# Patient Record
Sex: FEMALE | Race: WHITE | NOT HISPANIC OR LATINO | Employment: UNEMPLOYED | URBAN - METROPOLITAN AREA
[De-identification: names, ages, dates, MRNs, and addresses within clinical notes are randomized per-mention and may not be internally consistent; named-entity substitution may affect disease eponyms.]

---

## 2024-01-02 ENCOUNTER — HOSPITAL ENCOUNTER (OUTPATIENT)
Dept: RADIOLOGY | Facility: CLINIC | Age: 54
Discharge: HOME | End: 2024-01-02
Payer: COMMERCIAL

## 2024-01-02 ENCOUNTER — OFFICE VISIT (OUTPATIENT)
Dept: URGENT CARE | Facility: CLINIC | Age: 54
End: 2024-01-02
Payer: COMMERCIAL

## 2024-01-02 VITALS
SYSTOLIC BLOOD PRESSURE: 103 MMHG | TEMPERATURE: 97.8 F | RESPIRATION RATE: 18 BRPM | HEART RATE: 84 BPM | OXYGEN SATURATION: 93 % | DIASTOLIC BLOOD PRESSURE: 78 MMHG

## 2024-01-02 DIAGNOSIS — J18.9 PNEUMONIA OF RIGHT MIDDLE LOBE DUE TO INFECTIOUS ORGANISM: ICD-10-CM

## 2024-01-02 DIAGNOSIS — R05.1 ACUTE COUGH: Primary | ICD-10-CM

## 2024-01-02 DIAGNOSIS — R05.1 ACUTE COUGH: ICD-10-CM

## 2024-01-02 PROCEDURE — 99204 OFFICE O/P NEW MOD 45 MIN: CPT | Performed by: PHYSICIAN ASSISTANT

## 2024-01-02 PROCEDURE — 71046 X-RAY EXAM CHEST 2 VIEWS: CPT | Performed by: RADIOLOGY

## 2024-01-02 PROCEDURE — 71046 X-RAY EXAM CHEST 2 VIEWS: CPT

## 2024-01-02 RX ORDER — PREDNISONE 20 MG/1
40 TABLET ORAL DAILY
Qty: 10 TABLET | Refills: 0 | Status: SHIPPED | OUTPATIENT
Start: 2024-01-02 | End: 2024-01-07

## 2024-01-02 RX ORDER — DOXYCYCLINE 100 MG/1
100 CAPSULE ORAL 2 TIMES DAILY
Qty: 20 CAPSULE | Refills: 0 | Status: SHIPPED | OUTPATIENT
Start: 2024-01-02 | End: 2024-01-12

## 2024-01-02 ASSESSMENT — ENCOUNTER SYMPTOMS
WHEEZING: 1
PSYCHIATRIC NEGATIVE: 1
FEVER: 1
GASTROINTESTINAL NEGATIVE: 1
ENDOCRINE NEGATIVE: 1
ALLERGIC/IMMUNOLOGIC NEGATIVE: 1
HEMATOLOGIC/LYMPHATIC NEGATIVE: 1
COUGH: 1
MUSCULOSKELETAL NEGATIVE: 1
NEUROLOGICAL NEGATIVE: 1
EYES NEGATIVE: 1
SORE THROAT: 0

## 2024-01-02 NOTE — PROGRESS NOTES
Subjective   Patient ID: Ayesha Nova is a 53 y.o. female.      History provided by:  Patient   used: No    Fever   Associated symptoms include congestion, coughing and wheezing. Pertinent negatives include no ear pain or sore throat.   Cough  Associated symptoms include a fever and wheezing. Pertinent negatives include no ear pain or sore throat.   Wheezing  Associated symptoms: cough and fever    Associated symptoms: no ear pain and no sore throat      This is a 53 yr old female here for respiratory sxs. Fever up to 101, wheezing, cough productive of yellow sputum, and clear nasal congestion all week. Had negative COVID, flu and RSV test at pcp office, but no provider in to see pt today. Hx of lung mets from breast Ca.   Review of Systems   Constitutional:  Positive for fever.   HENT:  Positive for congestion. Negative for ear pain and sore throat.    Eyes: Negative.    Respiratory:  Positive for cough and wheezing.    Cardiovascular:  Negative for leg swelling.   Gastrointestinal: Negative.    Endocrine: Negative.    Genitourinary: Negative.    Musculoskeletal: Negative.    Skin: Negative.    Allergic/Immunologic: Negative.    Neurological: Negative.    Hematological: Negative.    Psychiatric/Behavioral: Negative.     All other systems reviewed and are negative.  /78   Pulse 84   Temp 36.6 °C (97.8 °F)   Resp 18   SpO2 93%     Objective   Physical Exam  Vitals and nursing note reviewed.   Constitutional:       Appearance: Normal appearance.   HENT:      Head: Normocephalic and atraumatic.      Right Ear: Tympanic membrane and ear canal normal.      Left Ear: Tympanic membrane and ear canal normal.      Mouth/Throat:      Mouth: Mucous membranes are moist.      Pharynx: Oropharynx is clear.   Cardiovascular:      Rate and Rhythm: Normal rate and regular rhythm.   Pulmonary:      Breath sounds: Wheezing and rhonchi present.   Musculoskeletal:      Cervical back: Neck supple.    Lymphadenopathy:      Cervical: No cervical adenopathy.   Skin:     General: Skin is warm and dry.   Neurological:      General: No focal deficit present.      Mental Status: She is alert and oriented to person, place, and time.   Psychiatric:         Mood and Affect: Mood normal.         Behavior: Behavior normal.     Assessment:  PNA    Plan;  CXR shows RML inflitrate by my read  Doxycycline 100 mg bid x 10 days  Prednisone 40 mg daily x 5 days  Has albuterol MDI as directed  OTC cough med as directed  Pcp follow up in 2-3 days  ER visit anytime 24/7 for acute worsening or changing condition

## 2024-01-02 NOTE — PATIENT INSTRUCTIONS
Use your albuterol MDI as directed  OTC cough med like delsym, robitussin or mucinex as directed  Fluids and rest  See pcp in 2-3 days for recheck  ER visit anytime 24/7 for acute worsening or changing condition

## 2024-01-05 ENCOUNTER — ANCILLARY PROCEDURE (OUTPATIENT)
Dept: RADIOLOGY | Facility: CLINIC | Age: 54
End: 2024-01-05
Payer: COMMERCIAL

## 2024-01-05 DIAGNOSIS — R05.9 COUGH, UNSPECIFIED: ICD-10-CM

## 2024-01-05 DIAGNOSIS — C50.919 MALIGNANT NEOPLASM OF UNSPECIFIED SITE OF UNSPECIFIED FEMALE BREAST (MULTI): ICD-10-CM

## 2024-01-05 DIAGNOSIS — R06.02 SHORTNESS OF BREATH: ICD-10-CM

## 2024-01-05 DIAGNOSIS — C79.9 SECONDARY MALIGNANT NEOPLASM OF UNSPECIFIED SITE (CODE) (MULTI): ICD-10-CM

## 2024-01-05 PROCEDURE — 71250 CT THORAX DX C-: CPT

## 2024-01-05 PROCEDURE — 71250 CT THORAX DX C-: CPT | Performed by: RADIOLOGY

## 2024-01-12 ENCOUNTER — APPOINTMENT (OUTPATIENT)
Dept: RADIOLOGY | Facility: CLINIC | Age: 54
End: 2024-01-12
Payer: COMMERCIAL

## 2024-02-09 ENCOUNTER — APPOINTMENT (OUTPATIENT)
Dept: CARDIOLOGY | Facility: CLINIC | Age: 54
End: 2024-02-09
Payer: COMMERCIAL

## 2024-02-09 PROBLEM — M25.552 LEFT HIP PAIN: Status: ACTIVE | Noted: 2024-02-09

## 2024-02-09 PROBLEM — R91.8 MULTIPLE PULMONARY NODULES: Status: ACTIVE | Noted: 2018-11-07

## 2024-02-09 PROBLEM — R92.8 ABNORMAL FINDING ON BREAST IMAGING: Status: ACTIVE | Noted: 2024-02-09

## 2024-02-09 PROBLEM — J06.9 VIRAL UPPER RESPIRATORY TRACT INFECTION WITH COUGH: Status: ACTIVE | Noted: 2024-02-09

## 2024-02-09 PROBLEM — K64.9 HEMORRHOIDS: Status: ACTIVE | Noted: 2024-02-09

## 2024-02-09 PROBLEM — Z86.59 HISTORY OF CLAUSTROPHOBIA: Status: ACTIVE | Noted: 2024-02-09

## 2024-02-09 PROBLEM — E88.810 METABOLIC SYNDROME X: Status: ACTIVE | Noted: 2019-03-25

## 2024-02-09 PROBLEM — E78.5 HYPERLIPIDEMIA: Status: ACTIVE | Noted: 2019-03-25

## 2024-02-09 PROBLEM — N95.8 GENITOURINARY SYNDROME OF MENOPAUSE: Status: ACTIVE | Noted: 2024-02-09

## 2024-02-09 PROBLEM — N60.99 ATYPICAL DUCTAL HYPERPLASIA OF BREAST: Status: ACTIVE | Noted: 2024-02-09

## 2024-02-09 PROBLEM — K85.90 PANCREATITIS, ACUTE (HHS-HCC): Status: ACTIVE | Noted: 2024-02-09

## 2024-02-09 PROBLEM — C78.01 MALIGNANT NEOPLASM METASTATIC TO BOTH LUNGS (MULTI): Status: ACTIVE | Noted: 2022-02-17

## 2024-02-09 PROBLEM — J18.9 PNEUMONIA DUE TO INFECTIOUS ORGANISM: Status: ACTIVE | Noted: 2024-02-09

## 2024-02-09 PROBLEM — R05.1 ACUTE COUGH: Status: ACTIVE | Noted: 2024-02-09

## 2024-02-09 PROBLEM — I63.9 CEREBROVASCULAR ACCIDENT (CVA) (MULTI): Status: ACTIVE | Noted: 2024-02-09

## 2024-02-09 PROBLEM — C50.211 MALIGNANT NEOPLASM OF UPPER-INNER QUADRANT OF RIGHT FEMALE BREAST (MULTI): Status: ACTIVE | Noted: 2023-05-19

## 2024-02-09 PROBLEM — E05.90 THYROTOXICOSIS: Status: ACTIVE | Noted: 2019-03-25

## 2024-02-09 PROBLEM — M54.50 LOW BACK PAIN: Status: ACTIVE | Noted: 2024-02-09

## 2024-02-09 PROBLEM — N65.1 BREAST ASYMMETRY FOLLOWING RECONSTRUCTIVE SURGERY: Status: ACTIVE | Noted: 2024-02-09

## 2024-02-09 PROBLEM — K82.8 GALLBLADDER SLUDGE: Status: ACTIVE | Noted: 2024-02-09

## 2024-02-09 PROBLEM — Z86.16 PERSONAL HISTORY OF COVID-19: Status: ACTIVE | Noted: 2022-11-13

## 2024-02-09 PROBLEM — C78.02 MALIGNANT NEOPLASM METASTATIC TO BOTH LUNGS (MULTI): Status: ACTIVE | Noted: 2022-02-17

## 2024-03-07 ENCOUNTER — HOSPITAL ENCOUNTER (EMERGENCY)
Facility: HOSPITAL | Age: 54
Discharge: HOME | End: 2024-03-07
Attending: EMERGENCY MEDICINE
Payer: COMMERCIAL

## 2024-03-07 ENCOUNTER — APPOINTMENT (OUTPATIENT)
Dept: RADIOLOGY | Facility: HOSPITAL | Age: 54
End: 2024-03-07
Payer: COMMERCIAL

## 2024-03-07 ENCOUNTER — APPOINTMENT (OUTPATIENT)
Dept: CARDIOLOGY | Facility: HOSPITAL | Age: 54
End: 2024-03-07
Payer: COMMERCIAL

## 2024-03-07 VITALS
TEMPERATURE: 97.7 F | HEIGHT: 63 IN | SYSTOLIC BLOOD PRESSURE: 100 MMHG | OXYGEN SATURATION: 99 % | WEIGHT: 155 LBS | RESPIRATION RATE: 20 BRPM | DIASTOLIC BLOOD PRESSURE: 55 MMHG | BODY MASS INDEX: 27.46 KG/M2 | HEART RATE: 72 BPM

## 2024-03-07 DIAGNOSIS — R00.2 PALPITATIONS: Primary | ICD-10-CM

## 2024-03-07 LAB
ALBUMIN SERPL BCP-MCNC: 4 G/DL (ref 3.4–5)
ALP SERPL-CCNC: 36 U/L (ref 33–110)
ALT SERPL W P-5'-P-CCNC: 8 U/L (ref 7–45)
ANION GAP SERPL CALC-SCNC: 11 MMOL/L (ref 10–20)
AST SERPL W P-5'-P-CCNC: 15 U/L (ref 9–39)
BASOPHILS # BLD AUTO: 0.03 X10*3/UL (ref 0–0.1)
BASOPHILS NFR BLD AUTO: 0.5 %
BILIRUB DIRECT SERPL-MCNC: 0.1 MG/DL (ref 0–0.3)
BILIRUB SERPL-MCNC: 0.7 MG/DL (ref 0–1.2)
BNP SERPL-MCNC: 35 PG/ML (ref 0–99)
BUN SERPL-MCNC: 8 MG/DL (ref 6–23)
CALCIUM SERPL-MCNC: 9.3 MG/DL (ref 8.6–10.3)
CARDIAC TROPONIN I PNL SERPL HS: <3 NG/L (ref 0–13)
CHLORIDE SERPL-SCNC: 104 MMOL/L (ref 98–107)
CO2 SERPL-SCNC: 26 MMOL/L (ref 21–32)
CREAT SERPL-MCNC: 0.66 MG/DL (ref 0.5–1.05)
EGFRCR SERPLBLD CKD-EPI 2021: >90 ML/MIN/1.73M*2
EOSINOPHIL # BLD AUTO: 0.06 X10*3/UL (ref 0–0.7)
EOSINOPHIL NFR BLD AUTO: 1 %
ERYTHROCYTE [DISTWIDTH] IN BLOOD BY AUTOMATED COUNT: 12.7 % (ref 11.5–14.5)
GLUCOSE SERPL-MCNC: 81 MG/DL (ref 74–99)
HCT VFR BLD AUTO: 38.1 % (ref 36–46)
HGB BLD-MCNC: 12.6 G/DL (ref 12–16)
IMM GRANULOCYTES # BLD AUTO: 0.01 X10*3/UL (ref 0–0.7)
IMM GRANULOCYTES NFR BLD AUTO: 0.2 % (ref 0–0.9)
LIPASE SERPL-CCNC: 33 U/L (ref 9–82)
LYMPHOCYTES # BLD AUTO: 1.21 X10*3/UL (ref 1.2–4.8)
LYMPHOCYTES NFR BLD AUTO: 19.7 %
MAGNESIUM SERPL-MCNC: 2.17 MG/DL (ref 1.6–2.4)
MCH RBC QN AUTO: 29 PG (ref 26–34)
MCHC RBC AUTO-ENTMCNC: 33.1 G/DL (ref 32–36)
MCV RBC AUTO: 88 FL (ref 80–100)
MONOCYTES # BLD AUTO: 0.35 X10*3/UL (ref 0.1–1)
MONOCYTES NFR BLD AUTO: 5.7 %
NEUTROPHILS # BLD AUTO: 4.47 X10*3/UL (ref 1.2–7.7)
NEUTROPHILS NFR BLD AUTO: 72.9 %
NRBC BLD-RTO: 0 /100 WBCS (ref 0–0)
PLATELET # BLD AUTO: 260 X10*3/UL (ref 150–450)
POTASSIUM SERPL-SCNC: 4.2 MMOL/L (ref 3.5–5.3)
PROT SERPL-MCNC: 7 G/DL (ref 6.4–8.2)
RBC # BLD AUTO: 4.35 X10*6/UL (ref 4–5.2)
SODIUM SERPL-SCNC: 137 MMOL/L (ref 136–145)
TSH SERPL-ACNC: 0.91 MIU/L (ref 0.44–3.98)
WBC # BLD AUTO: 6.1 X10*3/UL (ref 4.4–11.3)

## 2024-03-07 PROCEDURE — 74177 CT ABD & PELVIS W/CONTRAST: CPT | Mod: FOREIGN READ | Performed by: RADIOLOGY

## 2024-03-07 PROCEDURE — 93005 ELECTROCARDIOGRAM TRACING: CPT

## 2024-03-07 PROCEDURE — 36415 COLL VENOUS BLD VENIPUNCTURE: CPT | Performed by: EMERGENCY MEDICINE

## 2024-03-07 PROCEDURE — 2550000001 HC RX 255 CONTRASTS: Performed by: EMERGENCY MEDICINE

## 2024-03-07 PROCEDURE — 74177 CT ABD & PELVIS W/CONTRAST: CPT

## 2024-03-07 PROCEDURE — 80053 COMPREHEN METABOLIC PANEL: CPT | Performed by: EMERGENCY MEDICINE

## 2024-03-07 PROCEDURE — 83880 ASSAY OF NATRIURETIC PEPTIDE: CPT | Performed by: EMERGENCY MEDICINE

## 2024-03-07 PROCEDURE — 71275 CT ANGIOGRAPHY CHEST: CPT

## 2024-03-07 PROCEDURE — 71275 CT ANGIOGRAPHY CHEST: CPT | Mod: FOREIGN READ | Performed by: RADIOLOGY

## 2024-03-07 PROCEDURE — 84484 ASSAY OF TROPONIN QUANT: CPT | Performed by: EMERGENCY MEDICINE

## 2024-03-07 PROCEDURE — 85025 COMPLETE CBC W/AUTO DIFF WBC: CPT | Performed by: EMERGENCY MEDICINE

## 2024-03-07 PROCEDURE — 99285 EMERGENCY DEPT VISIT HI MDM: CPT | Mod: 25

## 2024-03-07 PROCEDURE — 83735 ASSAY OF MAGNESIUM: CPT | Performed by: EMERGENCY MEDICINE

## 2024-03-07 PROCEDURE — 82248 BILIRUBIN DIRECT: CPT | Performed by: EMERGENCY MEDICINE

## 2024-03-07 PROCEDURE — 83690 ASSAY OF LIPASE: CPT | Performed by: EMERGENCY MEDICINE

## 2024-03-07 PROCEDURE — 84443 ASSAY THYROID STIM HORMONE: CPT | Performed by: EMERGENCY MEDICINE

## 2024-03-07 RX ADMIN — IOHEXOL 75 ML: 350 INJECTION, SOLUTION INTRAVENOUS at 14:08

## 2024-03-07 ASSESSMENT — ENCOUNTER SYMPTOMS
SHORTNESS OF BREATH: 1
ABDOMINAL PAIN: 0
DYSURIA: 0
BACK PAIN: 0
VOMITING: 0
SORE THROAT: 0
COLOR CHANGE: 0
CHILLS: 0
PALPITATIONS: 0
SEIZURES: 0
EYE PAIN: 0
COUGH: 1
HEMATURIA: 0
FEVER: 0
ARTHRALGIAS: 0

## 2024-03-07 ASSESSMENT — PAIN DESCRIPTION - PAIN TYPE: TYPE: ACUTE PAIN

## 2024-03-07 ASSESSMENT — PAIN - FUNCTIONAL ASSESSMENT
PAIN_FUNCTIONAL_ASSESSMENT: 0-10
PAIN_FUNCTIONAL_ASSESSMENT: 0-10

## 2024-03-07 ASSESSMENT — COLUMBIA-SUICIDE SEVERITY RATING SCALE - C-SSRS
6. HAVE YOU EVER DONE ANYTHING, STARTED TO DO ANYTHING, OR PREPARED TO DO ANYTHING TO END YOUR LIFE?: NO
1. IN THE PAST MONTH, HAVE YOU WISHED YOU WERE DEAD OR WISHED YOU COULD GO TO SLEEP AND NOT WAKE UP?: NO
2. HAVE YOU ACTUALLY HAD ANY THOUGHTS OF KILLING YOURSELF?: NO

## 2024-03-07 ASSESSMENT — PAIN SCALES - GENERAL
PAINLEVEL_OUTOF10: 0 - NO PAIN
PAINLEVEL_OUTOF10: 0 - NO PAIN

## 2024-03-07 ASSESSMENT — LIFESTYLE VARIABLES
EVER HAD A DRINK FIRST THING IN THE MORNING TO STEADY YOUR NERVES TO GET RID OF A HANGOVER: NO
EVER FELT BAD OR GUILTY ABOUT YOUR DRINKING: NO
HAVE PEOPLE ANNOYED YOU BY CRITICIZING YOUR DRINKING: NO
HAVE YOU EVER FELT YOU SHOULD CUT DOWN ON YOUR DRINKING: NO

## 2024-03-07 NOTE — ED PROVIDER NOTES
"HPI  Ayesha Nova is a 53 y.o. female with a history of breast cancer metastatic to lung not currently on therapy presenting to the ED with palpitations.  The patient reports that she has been having some epigastric and central chest palpitations with intermittent tightness.  She reports that it has been so intense that she is having difficulty sleeping and taking deep breaths.  She denies any nausea or vomiting.  She denies any lightheadedness or dizziness.  She denies any diarrhea, constipation, dysuria.  She denies any fevers.  She does report a chronic cough without sputum production.    PMH  Past Medical History:   Diagnosis Date    Personal history of malignant neoplasm of breast     History of malignant neoplasm of breast       Meds  No current outpatient medications    Allergies  Allergies   Allergen Reactions    Morphine Other     Patient states MS does not work at all for her !    Sulfa (Sulfonamide Antibiotics) Unknown        SHx       ROS  Review of Systems   Constitutional:  Negative for chills and fever.   HENT:  Negative for ear pain and sore throat.    Eyes:  Negative for pain and visual disturbance.   Respiratory:  Positive for cough and shortness of breath.    Cardiovascular:  Positive for chest pain. Negative for palpitations.   Gastrointestinal:  Negative for abdominal pain and vomiting.   Genitourinary:  Negative for dysuria and hematuria.   Musculoskeletal:  Negative for arthralgias and back pain.   Skin:  Negative for color change and rash.   Neurological:  Negative for seizures and syncope.   All other systems reviewed and are negative.      ------------------------------------------------------------------------------------------------------------------------------------------    /55 (BP Location: Right arm, Patient Position: Sitting)   Pulse 72   Temp 36.5 °C (97.7 °F)   Resp 20   Ht 1.6 m (5' 3\")   Wt 70.3 kg (155 lb)   SpO2 99%   BMI 27.46 kg/m²     Physical Exam  Vitals and " nursing note reviewed.   Constitutional:       General: She is not in acute distress.     Appearance: Normal appearance.   HENT:      Head: Normocephalic and atraumatic.      Right Ear: External ear normal.      Left Ear: External ear normal.   Eyes:      Extraocular Movements: Extraocular movements intact.      Conjunctiva/sclera: Conjunctivae normal.   Cardiovascular:      Rate and Rhythm: Normal rate and regular rhythm.      Pulses: Normal pulses.      Heart sounds: Normal heart sounds. No murmur heard.     No friction rub. No gallop.   Pulmonary:      Effort: Pulmonary effort is normal. No respiratory distress.      Breath sounds: No wheezing, rhonchi or rales.   Abdominal:      General: There is no distension.      Palpations: Abdomen is soft.      Tenderness: There is no abdominal tenderness. There is no guarding or rebound.   Musculoskeletal:         General: No swelling. Normal range of motion.      Cervical back: Neck supple.      Right lower leg: No edema.      Left lower leg: No edema.   Skin:     General: Skin is warm and dry.   Neurological:      General: No focal deficit present.      Mental Status: She is alert and oriented to person, place, and time.   Psychiatric:         Mood and Affect: Mood normal.          ------------------------------------------------------------------------------------------------------------------------------------------    Medical Decision Making: This is a well-appearing 53-year-old female presenting to the emergency department with concern for palpitations.  Her vital signs are normal and stable.  On examination she is very well-appearing with a soft, nontender abdomen.  At this time the etiology of her symptoms is not entirely clear.  She was monitored on telemetry and had no arrhythmia noted.  Her EKG reveals no signs of ischemia or arrhythmia as well.  Her TSH is normal so I have low concern for thyroid dysfunction causing palpitations.  I have low concern for acute  coronary syndrome given that her troponin is less than 3 and her EKG shows no signs of ischemia.  I have low concern for acute hepatitis and pancreatitis as her LFTs and lipase are normal.  I have low concern for symptomatic anemia as her blood counts are also normal.  Given her history, I did perform a CT pulmonary embolism study which revealed no evidence of acute intra thoracic abnormality.  T she was informed of her worsening metastatic disease burden.  There were no significant findings in her abdomen pelvis to explain her symptoms.  The patient was encouraged by these findings and amenable to follow-up with her primary oncologist.  She stable for discharge at this time with return precautions provided.      EKG interpreted by me:  ED Course as of 03/07/24 1805   Thu Mar 07, 2024   1240 BSUS: trace pericardial effusion, normal LVEF [AG]   1326 EKG:  Rate 65  NSR  Normal axis  Normal intervals  No ischemic changes [AG]      ED Course User Index  [AG] Kelby Walsh MD         Diagnoses as of 03/07/24 1805   Palpitations         Kelby Walsh MD  Emergency Medicine Attending       Kelby Walsh MD  03/07/24 1809

## 2024-03-07 NOTE — DISCHARGE INSTRUCTIONS
Future Appointments   Date Time Provider Department Center   3/14/2024  3:00 PM Ruy Carolina MD [id number] [place]       You were seen in the ED for palpitations.  Your blood work was reassuring.  Your CT scans revealed no reason for your symptoms.  Follow up with Dr. Carolina.  Return to the ED for any concerning symptoms.

## 2024-03-07 NOTE — ED TRIAGE NOTES
Patient BIB EMS from home for c/o palpitations and worsening shortness of breath. Patient states her palpitations are in the center of her upper abd, have become so frequent and intense its been difficult for her to sleep. Patient states she was able to walk up the stairs a month ago and feels now she can't bend over without feeling short of breath. Patient also endorses chest tightness. HX of metastic breast cancer, to the lungs. Was recently treated for pneumonia in January, finished her course of antibiotics.

## 2024-03-14 ENCOUNTER — OFFICE VISIT (OUTPATIENT)
Dept: HEMATOLOGY/ONCOLOGY | Facility: CLINIC | Age: 54
End: 2024-03-14
Payer: COMMERCIAL

## 2024-03-14 VITALS
RESPIRATION RATE: 20 BRPM | OXYGEN SATURATION: 97 % | HEART RATE: 89 BPM | WEIGHT: 147.71 LBS | SYSTOLIC BLOOD PRESSURE: 102 MMHG | TEMPERATURE: 98.6 F | DIASTOLIC BLOOD PRESSURE: 59 MMHG | HEIGHT: 63 IN | BODY MASS INDEX: 26.17 KG/M2

## 2024-03-14 DIAGNOSIS — C78.02 MALIGNANT NEOPLASM METASTATIC TO BOTH LUNGS (MULTI): Primary | ICD-10-CM

## 2024-03-14 DIAGNOSIS — C78.01 MALIGNANT NEOPLASM METASTATIC TO BOTH LUNGS (MULTI): Primary | ICD-10-CM

## 2024-03-14 PROCEDURE — 99215 OFFICE O/P EST HI 40 MIN: CPT | Performed by: INTERNAL MEDICINE

## 2024-03-14 RX ORDER — THYROID 30 MG/1
30 TABLET ORAL DAILY
COMMUNITY
End: 2024-05-21 | Stop reason: ALTCHOICE

## 2024-03-14 ASSESSMENT — PAIN SCALES - GENERAL: PAINLEVEL: 0-NO PAIN

## 2024-03-14 NOTE — PROGRESS NOTES
Patient ID: : Ayesha Nova            Primary Care Provider: BYRON House    LOCATION:  [facility]    HEMATOLOGY ONCOLOGY PROBLEMS:  1.  Recurrent metastatic breast cancer.    CHIEF COMPLAINTS:  Patient is in the clinic today for evaluation of recurrent metastatic breast cancer.    HISTORY:  Ayesha Nova is a 53 y.o. female with longstanding history of recurrent metastatic breast cancer.  She was initially diagnosed in 2016 when she self palpated a right breast lump with follow-up mammogram/ultrasound confirming 1.7 x 1.4 cm spiculated mass at 1 o'clock position.  There was also finding of 8 mm nodule in the left breast.  Right breast stereotactic needle core biopsy from March 2016 confirmed invasive ductal carcinoma, G3, strongly ER positive, moderately MD positive and HER2/della amplified.  Left breast biopsy was mainly suggestive of fibrocystic changes along with focal atypical ductal hyperplasia.  A follow-up excisional biopsy of the left breast showed no residual findings.  She declined adjuvant chemo, radiation, Herceptin or hormonal therapy.  In 2018 she was noted to have a left lower lobe nodule and underwent surgical resection with pathology results confirming metastatic disease.  She was lost to follow-up for long time and reevaluated in 2021.  Over time she has been evaluated by Dr. Alonso, Dr. Soliz and also by different physicians at [facility].  So far she has declined any treatment.  During her last evaluation with Dr. Soliz, she was advised initiation of systemic therapy with Herceptin/Perjeta based combination.    INTERVAL HISTORY:  This is my first visit with her.  She is being reevaluated at cancer center after a gap of 1 year.  She was recently evaluated in the ER with complaint of abdominal discomfort and a CT scan showed overall disease progression in the lungs along with mild pericardial effusion.  No history of nausea, vomiting, fever,  "night sweats, diarrhea, rash, anorexia or weight loss. No recent changes in medications.    PAST MEDICAL HISTORY:  1.  Recurrent breast cancer as detailed above.  2.  Hypothyroidism  3.  Endometriosis.  S/p complete hysterectomy in     SOCIAL HISTORY:   for 27 years and lives in [place].  Quit smoking around .  1 pack a day for 15 years prior smoking history.  Rare social alcohol intake.  She has been a homemaker.  Born and raised in [place] [facility].    FAMILY HISTORY:  Father  at age 69 from myocardial infarction.  Mother age 85 and has dementia.  1 brother and 2 children ~ alive and well.  Maternal aunt had a history of early stage breast cancer.  Her maternal cousin  from metastatic breast cancer.  No other family history of bleeding, clotting or malignant disorders in the family history.    OB/GYN history.    Postmenopausal secondary to complete hysterectomy in .  G3, P2 M1.  Normal pregnancies with normal deliveries.  No history of use of birth control pills or HRT.    REVIEW OF SYSTEMS:   Pertinent finding as per the history above.  All other systems have been reviewed and generally negative and noncontributory.    VITAL SIGNS  BSA: 1.73 meters squared  /59   Pulse 89   Temp 37 °C (98.6 °F) (Temporal)   Resp 20   Ht 1.6 m (5' 2.99\")   Wt 67 kg (147 lb 11.3 oz)   SpO2 97%   BMI 26.17 kg/m²     PHYSICAL EXAMINATION:  Detailed physical examination not done.    LAB DATA:  CBC, metabolic profile etc. were all normal on 3/7/2024.    RADIOLOGICAL DATA:  CT angio chest, abdomen and pelvis 3/7/2024  Impression:  No CT evidence of pulmonary embolism. No acute thoracic aortic pathology.  Emphysema with right upper lobe and left lower lobe spiculated nodules similar to prior with right hilar and mediastinal adenopathy again noted.  Interval increase in size of right lower lobe spiculated mass, consistent with worsening metastatic disease.  Small pericardial effusion.   Interval " development of tree-in-bud opacities in the right lower lobe with interval increase in tree-in-bud opacities in the right upper lobe consistent with infectious or inflammatory bronchiolitis versus lymphangitic spread of tumor.  No evidence of metastatic disease to the abdomen or pelvis.  Mild wall thickening of the stomach. Correlate clinically for gastritis.  Mild fatty infiltration of the liver.     No free air or fluid.  Unchanged simple left renal cyst.    ASSESSMENT / PLAN:  1.  Recurrent breast cancer.  Please refer the details as outlined above.  In summary patient was initially diagnosed with early stage disease in 2016 with triple positive breast cancer.  Declined adjuvant therapies at that time.  Since 2018 she has been noted to have recurrent disease and was treated with surgical resection of the left lung nodule.  Since 2021 she has been noted to have metastatic disease but has declined on treatment so far.    Lately she is having problem with generalized weakness, fatigue, shortness of breath.  Latest ER workup from 3/7/2024 is concerning for gradual disease progression.  Long discussion with patient and she is explained that we should strongly consider systemic therapy at this time.  To begin with, I will try to complete the reevaluation with a follow-up PET scan, echocardiogram and most importantly repeat tissue biopsy ASAP.  If she is confirmed to have triple positive disease as before, then plan will be to start her on Herceptin/Perjeta based combination.  I also tried to address her fears and it was also emphasized that it is important to treat the breast cancer now before she will have much more wider progression and late stage disease.  I also reviewed the option of even oral systemic endocrine therapy in case she declines intravenous HER2 targeted therapies.    2.  Follow-up.  She will return to the clinic immediately after PET scan and biopsy.      A total of 60 minutes were spent in evaluation  - performing physical examination, history taking, review of the previous extensive records/information and formulating current and future management plan. Majority of the time was spend in consultation and discussion.    This note has been transcribed using Dragon voice recognition system and there is a possibility of unintentional typing misprints.

## 2024-03-15 ENCOUNTER — OFFICE VISIT (OUTPATIENT)
Dept: CARDIOLOGY | Facility: CLINIC | Age: 54
End: 2024-03-15
Payer: COMMERCIAL

## 2024-03-15 ENCOUNTER — HOSPITAL ENCOUNTER (OUTPATIENT)
Dept: CARDIOLOGY | Facility: CLINIC | Age: 54
Discharge: HOME | End: 2024-03-15
Payer: COMMERCIAL

## 2024-03-15 VITALS
HEART RATE: 96 BPM | HEIGHT: 63 IN | DIASTOLIC BLOOD PRESSURE: 64 MMHG | WEIGHT: 147 LBS | OXYGEN SATURATION: 97 % | SYSTOLIC BLOOD PRESSURE: 106 MMHG | BODY MASS INDEX: 26.05 KG/M2

## 2024-03-15 DIAGNOSIS — R00.2 PALPITATIONS: ICD-10-CM

## 2024-03-15 DIAGNOSIS — C78.02 MALIGNANT NEOPLASM METASTATIC TO BOTH LUNGS (MULTI): ICD-10-CM

## 2024-03-15 DIAGNOSIS — I30.0 ACUTE IDIOPATHIC PERICARDITIS (HHS-HCC): Primary | ICD-10-CM

## 2024-03-15 DIAGNOSIS — Z79.899 OTHER LONG TERM (CURRENT) DRUG THERAPY: ICD-10-CM

## 2024-03-15 DIAGNOSIS — I31.39 PERICARDIAL EFFUSION (HHS-HCC): ICD-10-CM

## 2024-03-15 DIAGNOSIS — I31.39 OTHER PERICARDIAL EFFUSION (NONINFLAMMATORY) (HHS-HCC): ICD-10-CM

## 2024-03-15 DIAGNOSIS — E78.5 HYPERLIPIDEMIA, UNSPECIFIED HYPERLIPIDEMIA TYPE: ICD-10-CM

## 2024-03-15 DIAGNOSIS — C78.01 MALIGNANT NEOPLASM METASTATIC TO BOTH LUNGS (MULTI): ICD-10-CM

## 2024-03-15 PROCEDURE — 1036F TOBACCO NON-USER: CPT | Performed by: INTERNAL MEDICINE

## 2024-03-15 PROCEDURE — 99213 OFFICE O/P EST LOW 20 MIN: CPT | Performed by: INTERNAL MEDICINE

## 2024-03-15 PROCEDURE — 93306 TTE W/DOPPLER COMPLETE: CPT | Performed by: INTERNAL MEDICINE

## 2024-03-15 PROCEDURE — 76376 3D RENDER W/INTRP POSTPROCES: CPT | Performed by: INTERNAL MEDICINE

## 2024-03-15 PROCEDURE — 76376 3D RENDER W/INTRP POSTPROCES: CPT

## 2024-03-15 PROCEDURE — 93356 MYOCRD STRAIN IMG SPCKL TRCK: CPT | Performed by: INTERNAL MEDICINE

## 2024-03-15 RX ORDER — METHYLPREDNISOLONE 4 MG/1
TABLET ORAL
Qty: 21 TABLET | Refills: 0 | Status: SHIPPED | OUTPATIENT
Start: 2024-03-15 | End: 2024-03-22

## 2024-03-15 ASSESSMENT — ENCOUNTER SYMPTOMS
DYSPNEA ON EXERTION: 1
PALPITATIONS: 1

## 2024-03-15 NOTE — PROGRESS NOTES
"Tim Nova is a 53 y.o. female.    Chief Complaint:  Urgent cardiac evaluation for the presence of a pericardial effusion.  Palpitations.    HPI    This is a 53-year-old woman who presents for cardiac evaluation.  She underwent an echocardiographic study today.  She is found to have a moderate pericardial effusion.  We are asked to see and evaluate.  She was in the emergency room on March 7, 2024 with palpitations and epigastric discomfort.  The patient reports mid epigastric pain.  No chest pressure.  Has occasional skipped beats.  Workup in the emergency room was negative.  EKGs were negative.  She did have a CT of the chest which was negative for acute pulmonary embolism.    Her cardiac history is otherwise unremarkable.  No history of hypertension.  Past echocardiographic studies have demonstrated normal left ventricular function with normal global longitudinal strain measurements.    Past medical history: Significant for metastatic breast cancer.  This dates back to 2016.  In 2018 she had lung surgery.    Review of Systems   Cardiovascular:  Positive for chest pain, dyspnea on exertion and palpitations.   All other systems reviewed and are negative.      Visit Vitals  /64 (BP Location: Right arm, Patient Position: Sitting)   Pulse 96   Ht 1.588 m (5' 2.5\")   Wt 66.7 kg (147 lb)   SpO2 97%   BMI 26.46 kg/m²   Smoking Status Former   BSA 1.72 m²        Objective     Constitutional:       Appearance: Not in distress.   Neck:      Vascular: JVD normal.   Pulmonary:      Breath sounds: Normal breath sounds.   Cardiovascular:      Normal rate. Regular rhythm. Normal S1. Normal S2.       Murmurs: There is no murmur.      No gallop.    Pulses:     Intact distal pulses.   Edema:     Peripheral edema absent.   Abdominal:      General: There is no distension.      Palpations: Abdomen is soft.   Neurological:      Mental Status: Alert.         Lab Review:   Lab Results   Component Value Date     " 03/07/2024    K 4.2 03/07/2024     03/07/2024    CO2 26 03/07/2024    BUN 8 03/07/2024    CREATININE 0.66 03/07/2024    GLUCOSE 81 03/07/2024    CALCIUM 9.3 03/07/2024     Lab Results   Component Value Date    WBC 6.1 03/07/2024    HGB 12.6 03/07/2024    HCT 38.1 03/07/2024    MCV 88 03/07/2024     03/07/2024     Lab Results   Component Value Date    CHOL 132 08/23/2019    TRIG 66 08/23/2019    HDL 42.9 08/23/2019       Assessment:    1.  Moderate pericardial effusion.  Today's echocardiographic study demonstrates a moderate pericardial effusion.  Mild right atrial collapse.  No other parameters for tamponade.  This either represents metastatic disease or an inflammatory process.  Will give her a trial of steroids.  My strong suspicion is that this represents metastatic disease.  We we will have her track her heart rates.  Her baseline heart rate today is 85.  If he becomes consistently elevated at rest that would be an indication that she is developing tamponade and she is to go to the emergency room directly.  We discussed our findings in detail with the patient.

## 2024-03-15 NOTE — PATIENT INSTRUCTIONS
Start a Medrol Dospak    Get a finger pulse oximeter to monitor your heart rate.    If your heart rate at rest is above 120, go to the ER.    We will see you back in 2 weeks with a limited echocardiogram.

## 2024-03-16 LAB
ATRIAL RATE: 65 BPM
P AXIS: 54 DEGREES
PR INTERVAL: 139 MS
Q ONSET: 254 MS
QRS COUNT: 11 BEATS
QRS DURATION: 77 MS
QT INTERVAL: 402 MS
QTC CALCULATION(BAZETT): 418 MS
QTC FREDERICIA: 412 MS
R AXIS: 0 DEGREES
T AXIS: 6 DEGREES
T OFFSET: 455 MS
VENTRICULAR RATE: 65 BPM

## 2024-03-18 LAB
AORTIC VALVE MEAN GRADIENT: 2.2 MMHG
AORTIC VALVE PEAK VELOCITY: 0.98 M/S
AV PEAK GRADIENT: 3.8 MMHG
AVA (PEAK VEL): 2.95 CM2
AVA (VTI): 2.94 CM2
EJECTION FRACTION APICAL 4 CHAMBER: 65.1
EJECTION FRACTION: 50 %
LEFT VENTRICLE INTERNAL DIMENSION DIASTOLE: 4.18 CM (ref 3.5–6)
LEFT VENTRICULAR OUTFLOW TRACT DIAMETER: 2 CM
RIGHT VENTRICLE FREE WALL PEAK S': 0.22 CM/S
RIGHT VENTRICLE PEAK SYSTOLIC PRESSURE: 21.7 MMHG
TRICUSPID ANNULAR PLANE SYSTOLIC EXCURSION: 1.9 CM

## 2024-04-02 PROBLEM — N64.81 PTOSIS OF BREAST: Status: ACTIVE | Noted: 2024-02-09

## 2024-04-02 PROBLEM — R42 DIZZINESS: Status: ACTIVE | Noted: 2024-04-02

## 2024-04-11 ENCOUNTER — APPOINTMENT (OUTPATIENT)
Dept: CARDIOLOGY | Facility: CLINIC | Age: 54
End: 2024-04-11
Payer: COMMERCIAL

## 2024-04-13 NOTE — PROGRESS NOTES
Subjective   Ayesha Nova is a 53 y.o. female.    Chief Complaint:  Follow-up pericardial effusion.    HPI    We did a telephone visit for this patient as she had her echo study done at [place] we are based today at [facility].    She is here in March for routine echocardiographic study.  She was found to have a moderate pericardial effusion.  We placed her on a short course of steroids.  She had some other issues and problems and just completed the course of steroids.  She feels okay.  She is tracking her heart rates daily at home.  Her heart rate has a been about 80.  No chest pains.    She was in the emergency room on March 7, 2024 with palpitations and epigastric discomfort.  The patient reports mid epigastric pain.  No chest pressure.  Has occasional skipped beats.  Workup in the emergency room was negative.  EKGs were negative.  She did have a CT of the chest which was negative for acute pulmonary embolism.     Her cardiac history is otherwise unremarkable.  No history of hypertension.  Past echocardiographic studies have demonstrated normal left ventricular function with normal global longitudinal strain measurements.     Past medical history: Significant for metastatic breast cancer.  This dates back to 2016.  In 2018 she had lung surgery.     Review of Systems   Cardiovascular:  Positive for chest pain, dyspnea on exertion and palpitations.   All other systems reviewed and are negative.         Objective     Physical Exam    Lab Review:   Lab Results   Component Value Date     03/07/2024    K 4.2 03/07/2024     03/07/2024    CO2 26 03/07/2024    BUN 8 03/07/2024    CREATININE 0.66 03/07/2024    GLUCOSE 81 03/07/2024    CALCIUM 9.3 03/07/2024       Assessment:    1.  Pericardial effusion.  There has been worsening of her pericardial effusion in comparison to the previous study performed 1 month ago.  Options include observation versus pericardiocentesis versus pericardial window.  We  are going to have the interventional cardiologist take a look at the images and then we will discuss the best option for this patient.    Addendum:    We did discuss the findings with Dr. Juan Higuera.  There is the ability to perform pericardiocentesis based on the characteristics and the location of the fluid.    Discussed with patient:  Shoe would like to try one more round of anti-inflammatory medication before proceeding with pericardiocentesis.

## 2024-04-15 ENCOUNTER — TELEMEDICINE (OUTPATIENT)
Dept: CARDIOLOGY | Facility: CLINIC | Age: 54
End: 2024-04-15
Payer: COMMERCIAL

## 2024-04-15 ENCOUNTER — HOSPITAL ENCOUNTER (OUTPATIENT)
Dept: CARDIOLOGY | Facility: CLINIC | Age: 54
Discharge: HOME | End: 2024-04-15
Payer: COMMERCIAL

## 2024-04-15 DIAGNOSIS — I31.39 PERICARDIAL EFFUSION (HHS-HCC): ICD-10-CM

## 2024-04-15 DIAGNOSIS — Z79.899 OTHER LONG TERM (CURRENT) DRUG THERAPY: ICD-10-CM

## 2024-04-15 DIAGNOSIS — R00.2 PALPITATIONS: ICD-10-CM

## 2024-04-15 DIAGNOSIS — I31.39 PERICARDIAL EFFUSION (HHS-HCC): Primary | ICD-10-CM

## 2024-04-15 DIAGNOSIS — E78.5 HYPERLIPIDEMIA, UNSPECIFIED HYPERLIPIDEMIA TYPE: ICD-10-CM

## 2024-04-15 DIAGNOSIS — I30.0 ACUTE IDIOPATHIC PERICARDITIS (HHS-HCC): ICD-10-CM

## 2024-04-15 PROBLEM — R05.1 ACUTE COUGH: Status: RESOLVED | Noted: 2024-02-09 | Resolved: 2024-04-15

## 2024-04-15 LAB — RIGHT VENTRICLE PEAK SYSTOLIC PRESSURE: 16.1 MMHG

## 2024-04-15 PROCEDURE — 99213 OFFICE O/P EST LOW 20 MIN: CPT | Performed by: INTERNAL MEDICINE

## 2024-04-15 PROCEDURE — 93308 TTE F-UP OR LMTD: CPT

## 2024-04-15 PROCEDURE — 93308 TTE F-UP OR LMTD: CPT | Performed by: INTERNAL MEDICINE

## 2024-04-15 RX ORDER — METHYLPREDNISOLONE 4 MG/1
TABLET ORAL
Qty: 21 TABLET | Refills: 0 | Status: SHIPPED | OUTPATIENT
Start: 2024-04-15 | End: 2024-04-22

## 2024-04-16 ENCOUNTER — APPOINTMENT (OUTPATIENT)
Dept: CARDIOLOGY | Facility: CLINIC | Age: 54
End: 2024-04-16
Payer: COMMERCIAL

## 2024-04-18 ENCOUNTER — OFFICE (OUTPATIENT)
Dept: URBAN - METROPOLITAN AREA CLINIC 26 | Facility: CLINIC | Age: 54
End: 2024-04-18

## 2024-04-18 VITALS
DIASTOLIC BLOOD PRESSURE: 72 MMHG | WEIGHT: 150 LBS | TEMPERATURE: 98.3 F | HEIGHT: 63 IN | HEART RATE: 86 BPM | SYSTOLIC BLOOD PRESSURE: 111 MMHG

## 2024-04-18 DIAGNOSIS — K21.9 GASTRO-ESOPHAGEAL REFLUX DISEASE WITHOUT ESOPHAGITIS: ICD-10-CM

## 2024-04-18 DIAGNOSIS — R93.3 ABNORMAL FINDINGS ON DIAGNOSTIC IMAGING OF OTHER PARTS OF DI: ICD-10-CM

## 2024-04-18 DIAGNOSIS — Z12.11 ENCOUNTER FOR SCREENING FOR MALIGNANT NEOPLASM OF COLON: ICD-10-CM

## 2024-04-18 DIAGNOSIS — R10.84 GENERALIZED ABDOMINAL PAIN: ICD-10-CM

## 2024-04-18 PROCEDURE — 99214 OFFICE O/P EST MOD 30 MIN: CPT | Performed by: INTERNAL MEDICINE

## 2024-04-23 ENCOUNTER — TELEPHONE (OUTPATIENT)
Dept: CARDIOLOGY | Facility: CLINIC | Age: 54
End: 2024-04-23

## 2024-04-23 NOTE — TELEPHONE ENCOUNTER
Patient Education     Viral Upper Respiratory Illness (Adult)    You have a viral upper respiratory illness (URI), which is another term for the common cold. This illness is contagious during the first few days. It is spread through the air by coughing and sneezing. It may also be spread by direct contact (touching the sick person and then touching your own eyes, nose, or mouth). Frequent handwashing will decrease risk of spread. Most viral illnesses go away within 7 to 10 days with rest and simple home remedies. Sometimes the illness may last for several weeks. Antibiotics will not kill a virus, and they are generally not prescribed for this condition.  Home care  · If symptoms are severe, rest at home for the first 2 to 3 days. When you resume activity, don't let yourself get too tired.  · Don't smoke. If you need help stopping, talk with your healthcare provider.  · Avoid being exposed to cigarette smoke (yours or others’).  · You may use acetaminophen or ibuprofen to control pain and fever, unless another medicine was prescribed. If you have chronic liver or kidney disease, have ever had a stomach ulcer or gastrointestinal bleeding, or are taking blood-thinning medicines, talk with your healthcare provider before using these medicines. Aspirin should never be given to anyone under 18 years of age who is ill with a viral infection or fever. It may cause severe liver or brain damage.  · Your appetite may be poor, so a light diet is fine. Stay well hydrated by drinking 6 to 8 glasses of fluids per day (water, soft drinks, juices, tea, or soup). Extra fluids will help loosen secretions in the nose and lungs.  · Over-the-counter cold medicines will not shorten the length of time you’re sick, but they may be helpful for the following symptoms: cough, sore throat, and nasal and sinus congestion. If you take prescription medicines, ask your healthcare provider or pharmacist which over-the-counter medicines are safe to  Called pt to discuss. EGD/colonoscopy elective procedure. Pt currently taking prednisone for pericarditis, having an echo and ov with Dr. Grajeda first week of May to decide on pericardiocentesis. Informed pt if EGD/colonoscopy is not needed urgently it would be best to wait until Prednisone, echo and ov are complete. She can discuss with Dr. Grajeda at ov on 5/6/24. Pt verbalizes understanding understanding of all instructions and information provided.     use. (Note: Don't use decongestants if you have high blood pressure.)  Follow-up care  Follow up with your healthcare provider, or as advised.  When to seek medical advice  Call your healthcare provider right away if any of these occur:  · Cough with lots of colored sputum (mucus)  · Severe headache; face, neck, or ear pain  · Difficulty swallowing due to throat pain  · Fever of 100.4°F (38°C) or higher, or as directed by your healthcare provider  Call 911  Call 911 if any of these occur:  · Chest pain, shortness of breath, wheezing, or difficulty breathing  · Coughing up blood  · Very severe pain with swallowing, especially if it goes along with a muffled voice   [employer] last reviewed this educational content on 6/1/2018  © 8251-9356 [employer]. All rights reserved. This information is not intended as a substitute for professional medical care. Always follow your healthcare professional's instructions.

## 2024-04-23 NOTE — TELEPHONE ENCOUNTER
Patient wants to know if its ok to have endoscopy/Colonoscopy prior to having fluid drained. Is it safe or should she wait?

## 2024-04-24 ENCOUNTER — TELEPHONE (OUTPATIENT)
Dept: CARDIOLOGY | Facility: CLINIC | Age: 54
End: 2024-04-24
Payer: COMMERCIAL

## 2024-04-24 NOTE — TELEPHONE ENCOUNTER
Pt state she cannot wait for her echo and ov on 5/6. She is having chest tightness and and it feels very heavy. Her resting heart rate has been between  bpm.

## 2024-04-29 ENCOUNTER — APPOINTMENT (OUTPATIENT)
Dept: CARDIOLOGY | Facility: CLINIC | Age: 54
End: 2024-04-29
Payer: COMMERCIAL

## 2024-05-01 ENCOUNTER — APPOINTMENT (OUTPATIENT)
Dept: CARDIOLOGY | Facility: HOSPITAL | Age: 54
DRG: 598 | End: 2024-05-01
Payer: COMMERCIAL

## 2024-05-01 ENCOUNTER — TELEPHONE (OUTPATIENT)
Dept: CARDIOLOGY | Facility: HOSPITAL | Age: 54
End: 2024-05-01
Payer: COMMERCIAL

## 2024-05-01 ENCOUNTER — APPOINTMENT (OUTPATIENT)
Dept: RADIOLOGY | Facility: HOSPITAL | Age: 54
DRG: 598 | End: 2024-05-01
Payer: COMMERCIAL

## 2024-05-01 ENCOUNTER — PREP FOR PROCEDURE (OUTPATIENT)
Dept: CARDIAC SURGERY | Facility: CLINIC | Age: 54
End: 2024-05-01

## 2024-05-01 ENCOUNTER — HOSPITAL ENCOUNTER (INPATIENT)
Facility: HOSPITAL | Age: 54
LOS: 3 days | Discharge: HOME | DRG: 598 | End: 2024-05-04
Attending: STUDENT IN AN ORGANIZED HEALTH CARE EDUCATION/TRAINING PROGRAM | Admitting: STUDENT IN AN ORGANIZED HEALTH CARE EDUCATION/TRAINING PROGRAM
Payer: COMMERCIAL

## 2024-05-01 DIAGNOSIS — R00.2 PALPITATIONS: ICD-10-CM

## 2024-05-01 DIAGNOSIS — I31.39 PERICARDIAL EFFUSION (HHS-HCC): Primary | ICD-10-CM

## 2024-05-01 DIAGNOSIS — R00.0 TACHYCARDIA: ICD-10-CM

## 2024-05-01 DIAGNOSIS — I31.31 MALIGNANT PERICARDIAL EFFUSION IN DISEASES CLASSIFIED ELSEWHERE (MULTI): ICD-10-CM

## 2024-05-01 DIAGNOSIS — C50.211 MALIGNANT NEOPLASM OF UPPER-INNER QUADRANT OF RIGHT FEMALE BREAST, UNSPECIFIED ESTROGEN RECEPTOR STATUS (MULTI): ICD-10-CM

## 2024-05-01 LAB
ABO GROUP (TYPE) IN BLOOD: NORMAL
ALBUMIN SERPL BCP-MCNC: 4 G/DL (ref 3.4–5)
ALP SERPL-CCNC: 35 U/L (ref 33–110)
ALT SERPL W P-5'-P-CCNC: 12 U/L (ref 7–45)
ANION GAP SERPL CALC-SCNC: 13 MMOL/L (ref 10–20)
ANTIBODY SCREEN: NORMAL
APTT PPP: 35 SECONDS (ref 27–38)
AST SERPL W P-5'-P-CCNC: 17 U/L (ref 9–39)
BASOPHILS # BLD AUTO: 0.04 X10*3/UL (ref 0–0.1)
BASOPHILS NFR BLD AUTO: 0.5 %
BILIRUB SERPL-MCNC: 0.5 MG/DL (ref 0–1.2)
BNP SERPL-MCNC: 21 PG/ML (ref 0–99)
BUN SERPL-MCNC: 8 MG/DL (ref 6–23)
CALCIUM SERPL-MCNC: 9.2 MG/DL (ref 8.6–10.3)
CARDIAC TROPONIN I PNL SERPL HS: 4 NG/L (ref 0–13)
CHLORIDE SERPL-SCNC: 104 MMOL/L (ref 98–107)
CO2 SERPL-SCNC: 25 MMOL/L (ref 21–32)
CREAT SERPL-MCNC: 0.47 MG/DL (ref 0.5–1.05)
EGFRCR SERPLBLD CKD-EPI 2021: >90 ML/MIN/1.73M*2
EOSINOPHIL # BLD AUTO: 0.03 X10*3/UL (ref 0–0.7)
EOSINOPHIL NFR BLD AUTO: 0.4 %
ERYTHROCYTE [DISTWIDTH] IN BLOOD BY AUTOMATED COUNT: 13.9 % (ref 11.5–14.5)
GLUCOSE SERPL-MCNC: 99 MG/DL (ref 74–99)
HCT VFR BLD AUTO: 37.4 % (ref 36–46)
HGB BLD-MCNC: 12.5 G/DL (ref 12–16)
IMM GRANULOCYTES # BLD AUTO: 0.01 X10*3/UL (ref 0–0.7)
IMM GRANULOCYTES NFR BLD AUTO: 0.1 % (ref 0–0.9)
INR PPP: 1.1 (ref 0.9–1.1)
LACTATE SERPL-SCNC: 1.2 MMOL/L (ref 0.4–2)
LYMPHOCYTES # BLD AUTO: 1.52 X10*3/UL (ref 1.2–4.8)
LYMPHOCYTES NFR BLD AUTO: 18.8 %
MAGNESIUM SERPL-MCNC: 2.14 MG/DL (ref 1.6–2.4)
MCH RBC QN AUTO: 29.4 PG (ref 26–34)
MCHC RBC AUTO-ENTMCNC: 33.4 G/DL (ref 32–36)
MCV RBC AUTO: 88 FL (ref 80–100)
MONOCYTES # BLD AUTO: 0.58 X10*3/UL (ref 0.1–1)
MONOCYTES NFR BLD AUTO: 7.2 %
NEUTROPHILS # BLD AUTO: 5.92 X10*3/UL (ref 1.2–7.7)
NEUTROPHILS NFR BLD AUTO: 73 %
NRBC BLD-RTO: 0 /100 WBCS (ref 0–0)
PLATELET # BLD AUTO: 294 X10*3/UL (ref 150–450)
POTASSIUM SERPL-SCNC: 4.4 MMOL/L (ref 3.5–5.3)
PROT SERPL-MCNC: 7.2 G/DL (ref 6.4–8.2)
PROTHROMBIN TIME: 12.8 SECONDS (ref 9.8–12.8)
RBC # BLD AUTO: 4.25 X10*6/UL (ref 4–5.2)
RH FACTOR (ANTIGEN D): NORMAL
SODIUM SERPL-SCNC: 138 MMOL/L (ref 136–145)
WBC # BLD AUTO: 8.1 X10*3/UL (ref 4.4–11.3)

## 2024-05-01 PROCEDURE — 96360 HYDRATION IV INFUSION INIT: CPT

## 2024-05-01 PROCEDURE — 83605 ASSAY OF LACTIC ACID: CPT | Performed by: STUDENT IN AN ORGANIZED HEALTH CARE EDUCATION/TRAINING PROGRAM

## 2024-05-01 PROCEDURE — 85025 COMPLETE CBC W/AUTO DIFF WBC: CPT

## 2024-05-01 PROCEDURE — 2500000004 HC RX 250 GENERAL PHARMACY W/ HCPCS (ALT 636 FOR OP/ED): Mod: JZ | Performed by: STUDENT IN AN ORGANIZED HEALTH CARE EDUCATION/TRAINING PROGRAM

## 2024-05-01 PROCEDURE — 83880 ASSAY OF NATRIURETIC PEPTIDE: CPT | Performed by: STUDENT IN AN ORGANIZED HEALTH CARE EDUCATION/TRAINING PROGRAM

## 2024-05-01 PROCEDURE — 93005 ELECTROCARDIOGRAM TRACING: CPT

## 2024-05-01 PROCEDURE — 80053 COMPREHEN METABOLIC PANEL: CPT

## 2024-05-01 PROCEDURE — 87040 BLOOD CULTURE FOR BACTERIA: CPT | Mod: PARLAB | Performed by: STUDENT IN AN ORGANIZED HEALTH CARE EDUCATION/TRAINING PROGRAM

## 2024-05-01 PROCEDURE — 83735 ASSAY OF MAGNESIUM: CPT

## 2024-05-01 PROCEDURE — 99285 EMERGENCY DEPT VISIT HI MDM: CPT | Mod: 25

## 2024-05-01 PROCEDURE — 86901 BLOOD TYPING SEROLOGIC RH(D): CPT

## 2024-05-01 PROCEDURE — 85730 THROMBOPLASTIN TIME PARTIAL: CPT

## 2024-05-01 PROCEDURE — 94762 N-INVAS EAR/PLS OXIMTRY CONT: CPT

## 2024-05-01 PROCEDURE — 71046 X-RAY EXAM CHEST 2 VIEWS: CPT

## 2024-05-01 PROCEDURE — 36415 COLL VENOUS BLD VENIPUNCTURE: CPT | Performed by: STUDENT IN AN ORGANIZED HEALTH CARE EDUCATION/TRAINING PROGRAM

## 2024-05-01 PROCEDURE — 86920 COMPATIBILITY TEST SPIN: CPT

## 2024-05-01 PROCEDURE — 2020000001 HC ICU ROOM DAILY

## 2024-05-01 PROCEDURE — 85610 PROTHROMBIN TIME: CPT

## 2024-05-01 PROCEDURE — 71046 X-RAY EXAM CHEST 2 VIEWS: CPT | Performed by: RADIOLOGY

## 2024-05-01 PROCEDURE — 2500000004 HC RX 250 GENERAL PHARMACY W/ HCPCS (ALT 636 FOR OP/ED)

## 2024-05-01 PROCEDURE — 84484 ASSAY OF TROPONIN QUANT: CPT

## 2024-05-01 RX ORDER — VANCOMYCIN HYDROCHLORIDE 1 G/20ML
INJECTION, POWDER, LYOPHILIZED, FOR SOLUTION INTRAVENOUS DAILY PRN
Status: DISCONTINUED | OUTPATIENT
Start: 2024-05-01 | End: 2024-05-02

## 2024-05-01 RX ORDER — PANTOPRAZOLE SODIUM 40 MG/1
40 TABLET, DELAYED RELEASE ORAL
Status: DISCONTINUED | OUTPATIENT
Start: 2024-05-02 | End: 2024-05-02

## 2024-05-01 RX ORDER — PANTOPRAZOLE SODIUM 40 MG/10ML
40 INJECTION, POWDER, LYOPHILIZED, FOR SOLUTION INTRAVENOUS
Status: DISCONTINUED | OUTPATIENT
Start: 2024-05-02 | End: 2024-05-02

## 2024-05-01 RX ORDER — CEFEPIME 1 G/50ML
2 INJECTION, SOLUTION INTRAVENOUS EVERY 12 HOURS
Status: DISCONTINUED | OUTPATIENT
Start: 2024-05-01 | End: 2024-05-01

## 2024-05-01 RX ORDER — DEXTROSE 50 % IN WATER (D50W) INTRAVENOUS SYRINGE
25
Status: DISCONTINUED | OUTPATIENT
Start: 2024-05-01 | End: 2024-05-04 | Stop reason: HOSPADM

## 2024-05-01 RX ORDER — DEXTROSE 50 % IN WATER (D50W) INTRAVENOUS SYRINGE
12.5
Status: DISCONTINUED | OUTPATIENT
Start: 2024-05-01 | End: 2024-05-04 | Stop reason: HOSPADM

## 2024-05-01 RX ORDER — SODIUM CHLORIDE, SODIUM LACTATE, POTASSIUM CHLORIDE, CALCIUM CHLORIDE 600; 310; 30; 20 MG/100ML; MG/100ML; MG/100ML; MG/100ML
125 INJECTION, SOLUTION INTRAVENOUS CONTINUOUS
Status: DISCONTINUED | OUTPATIENT
Start: 2024-05-01 | End: 2024-05-02

## 2024-05-01 RX ORDER — ESOMEPRAZOLE MAGNESIUM 40 MG/1
40 GRANULE, DELAYED RELEASE ORAL
Status: DISCONTINUED | OUTPATIENT
Start: 2024-05-02 | End: 2024-05-02

## 2024-05-01 RX ORDER — CEFEPIME 1 G/50ML
2 INJECTION, SOLUTION INTRAVENOUS EVERY 8 HOURS
Status: DISCONTINUED | OUTPATIENT
Start: 2024-05-01 | End: 2024-05-02

## 2024-05-01 RX ADMIN — CEFEPIME 2 G: 1 INJECTION, SOLUTION INTRAVENOUS at 23:07

## 2024-05-01 RX ADMIN — SODIUM CHLORIDE, SODIUM LACTATE, POTASSIUM CHLORIDE, CALCIUM CHLORIDE 100 ML/HR: 600; 310; 30; 20 INJECTION, SOLUTION INTRAVENOUS at 21:50

## 2024-05-01 RX ADMIN — SODIUM CHLORIDE 1000 ML: 9 INJECTION, SOLUTION INTRAVENOUS at 19:58

## 2024-05-01 SDOH — SOCIAL STABILITY: SOCIAL INSECURITY: ARE THERE ANY APPARENT SIGNS OF INJURIES/BEHAVIORS THAT COULD BE RELATED TO ABUSE/NEGLECT?: NO

## 2024-05-01 SDOH — SOCIAL STABILITY: SOCIAL INSECURITY: HAVE YOU HAD ANY THOUGHTS OF HARMING ANYONE ELSE?: NO

## 2024-05-01 SDOH — ECONOMIC STABILITY: INCOME INSECURITY: IN THE LAST 12 MONTHS, WAS THERE A TIME WHEN YOU WERE NOT ABLE TO PAY THE MORTGAGE OR RENT ON TIME?: NO

## 2024-05-01 SDOH — ECONOMIC STABILITY: INCOME INSECURITY: HOW HARD IS IT FOR YOU TO PAY FOR THE VERY BASICS LIKE FOOD, HOUSING, MEDICAL CARE, AND HEATING?: NOT HARD AT ALL

## 2024-05-01 SDOH — SOCIAL STABILITY: SOCIAL INSECURITY: ARE YOU OR HAVE YOU BEEN THREATENED OR ABUSED PHYSICALLY, EMOTIONALLY, OR SEXUALLY BY ANYONE?: NO

## 2024-05-01 SDOH — ECONOMIC STABILITY: HOUSING INSECURITY
IN THE LAST 12 MONTHS, WAS THERE A TIME WHEN YOU DID NOT HAVE A STEADY PLACE TO SLEEP OR SLEPT IN A SHELTER (INCLUDING NOW)?: NO

## 2024-05-01 SDOH — SOCIAL STABILITY: SOCIAL INSECURITY: HAS ANYONE EVER THREATENED TO HURT YOUR FAMILY OR YOUR PETS?: NO

## 2024-05-01 SDOH — ECONOMIC STABILITY: HOUSING INSECURITY: IN THE LAST 12 MONTHS, HOW MANY PLACES HAVE YOU LIVED?: 1

## 2024-05-01 SDOH — ECONOMIC STABILITY: TRANSPORTATION INSECURITY
IN THE PAST 12 MONTHS, HAS LACK OF TRANSPORTATION KEPT YOU FROM MEETINGS, WORK, OR FROM GETTING THINGS NEEDED FOR DAILY LIVING?: NO

## 2024-05-01 SDOH — SOCIAL STABILITY: SOCIAL INSECURITY: DOES ANYONE TRY TO KEEP YOU FROM HAVING/CONTACTING OTHER FRIENDS OR DOING THINGS OUTSIDE YOUR HOME?: NO

## 2024-05-01 SDOH — SOCIAL STABILITY: SOCIAL INSECURITY: DO YOU FEEL UNSAFE GOING BACK TO THE PLACE WHERE YOU ARE LIVING?: NO

## 2024-05-01 SDOH — SOCIAL STABILITY: SOCIAL INSECURITY: HAVE YOU HAD THOUGHTS OF HARMING ANYONE ELSE?: NO

## 2024-05-01 SDOH — ECONOMIC STABILITY: TRANSPORTATION INSECURITY
IN THE PAST 12 MONTHS, HAS THE LACK OF TRANSPORTATION KEPT YOU FROM MEDICAL APPOINTMENTS OR FROM GETTING MEDICATIONS?: NO

## 2024-05-01 SDOH — SOCIAL STABILITY: SOCIAL INSECURITY: WERE YOU ABLE TO COMPLETE ALL THE BEHAVIORAL HEALTH SCREENINGS?: YES

## 2024-05-01 SDOH — SOCIAL STABILITY: SOCIAL INSECURITY: DO YOU FEEL ANYONE HAS EXPLOITED OR TAKEN ADVANTAGE OF YOU FINANCIALLY OR OF YOUR PERSONAL PROPERTY?: NO

## 2024-05-01 SDOH — SOCIAL STABILITY: SOCIAL INSECURITY: ABUSE: ADULT

## 2024-05-01 ASSESSMENT — PAIN SCALES - GENERAL
PAINLEVEL_OUTOF10: 3
PAINLEVEL_OUTOF10: 4

## 2024-05-01 ASSESSMENT — LIFESTYLE VARIABLES
HOW MANY STANDARD DRINKS CONTAINING ALCOHOL DO YOU HAVE ON A TYPICAL DAY: PATIENT DOES NOT DRINK
TOTAL SCORE: 0
AUDIT-C TOTAL SCORE: 0
HOW OFTEN DO YOU HAVE A DRINK CONTAINING ALCOHOL: NEVER
EVER FELT BAD OR GUILTY ABOUT YOUR DRINKING: NO
SKIP TO QUESTIONS 9-10: 1
HAVE YOU EVER FELT YOU SHOULD CUT DOWN ON YOUR DRINKING: NO
AUDIT-C TOTAL SCORE: 0
HOW OFTEN DO YOU HAVE 6 OR MORE DRINKS ON ONE OCCASION: NEVER
EVER HAD A DRINK FIRST THING IN THE MORNING TO STEADY YOUR NERVES TO GET RID OF A HANGOVER: NO
HAVE PEOPLE ANNOYED YOU BY CRITICIZING YOUR DRINKING: NO

## 2024-05-01 ASSESSMENT — COGNITIVE AND FUNCTIONAL STATUS - GENERAL
PATIENT BASELINE BEDBOUND: NO
MOBILITY SCORE: 24
DAILY ACTIVITIY SCORE: 24

## 2024-05-01 ASSESSMENT — COLUMBIA-SUICIDE SEVERITY RATING SCALE - C-SSRS
1. IN THE PAST MONTH, HAVE YOU WISHED YOU WERE DEAD OR WISHED YOU COULD GO TO SLEEP AND NOT WAKE UP?: NO
6. HAVE YOU EVER DONE ANYTHING, STARTED TO DO ANYTHING, OR PREPARED TO DO ANYTHING TO END YOUR LIFE?: NO
2. HAVE YOU ACTUALLY HAD ANY THOUGHTS OF KILLING YOURSELF?: NO

## 2024-05-01 ASSESSMENT — ACTIVITIES OF DAILY LIVING (ADL)
HEARING - RIGHT EAR: FUNCTIONAL
DRESSING YOURSELF: INDEPENDENT
GROOMING: INDEPENDENT
HEARING - LEFT EAR: FUNCTIONAL
JUDGMENT_ADEQUATE_SAFELY_COMPLETE_DAILY_ACTIVITIES: YES
ADEQUATE_TO_COMPLETE_ADL: YES
PATIENT'S MEMORY ADEQUATE TO SAFELY COMPLETE DAILY ACTIVITIES?: YES
FEEDING YOURSELF: INDEPENDENT
BATHING: INDEPENDENT
WALKS IN HOME: INDEPENDENT
TOILETING: INDEPENDENT

## 2024-05-01 ASSESSMENT — PATIENT HEALTH QUESTIONNAIRE - PHQ9
1. LITTLE INTEREST OR PLEASURE IN DOING THINGS: NOT AT ALL
2. FEELING DOWN, DEPRESSED OR HOPELESS: NOT AT ALL
SUM OF ALL RESPONSES TO PHQ9 QUESTIONS 1 & 2: 0

## 2024-05-01 ASSESSMENT — PAIN - FUNCTIONAL ASSESSMENT: PAIN_FUNCTIONAL_ASSESSMENT: 0-10

## 2024-05-01 NOTE — PROGRESS NOTES
Patient called cardiology on call with symptoms of tachycardia with heart rates and 120s in the setting of moderate pericardial effusion, metastatic cancer.     Prior TTE showed early diastolic RA collapse, which can be consistent with early tamponade physiology.     Instructed patient to seek immediate medical care in the emergency room for further evaluation treatment. Recommend cardiology consult and cardio thoracic surgery consult on admission for consideration of pericardial window.    Patient agreeable to plan of care.    Chas Jorgensen MD  5/1/2024 5:15 pm

## 2024-05-01 NOTE — ED PROVIDER NOTES
HPI   Chief Complaint   Patient presents with    Chest Pain     Pt has a complaint of chest discomfort. States that her cardiologist told her to come in because she has a pericardial effusion.        Ayesha is a 53-year-old female with a past medical history of metastatic breast cancer to the lungs presenting to the emergency department at the discretion of her cardiologist for concerns of pericardial effusion.  Patient currently follows with Dr. Jorgensen who performed a formal TTE on 4/15/2024 which demonstrated early diastolic RA collapse which can be consistent with early tamponade.  Today, she called the cardiology office and inform them that she was having increased shortness of breath and chest tightness.  Patient was ultimately recommended to come to the ED by Dr. Jorgensen.  In the ED, she does endorse chest tightness, but denies it being painful.  She does have some increased shortness of breath.  No cough.  Earlier today, she had an episode of chills and took her temperature with a reading of 100.2 °F.  No nausea, vomiting, diarrhea, lightheadedness, dizziness, syncope, abdominal pain, headache.  Patient denies any cardiac history.                          [place] Coma Scale Score: 15                     Patient History   Past Medical History:   Diagnosis Date    Personal history of malignant neoplasm of breast     History of malignant neoplasm of breast     Past Surgical History:   Procedure Laterality Date    HYSTERECTOMY  2016    Hysterectomy    OTHER SURGICAL HISTORY  2022    Breast reduction    OTHER SURGICAL HISTORY  2022    Lumpectomy    OTHER SURGICAL HISTORY  2022    Lung wedge resection     No family history on file.  Social History     Tobacco Use    Smoking status: Former     Current packs/day: 0.00     Types: Cigarettes     Quit date: 2020     Years since quittin.3    Smokeless tobacco: Never   Substance Use Topics    Alcohol use: Never    Drug use: Never        Physical Exam   ED Triage Vitals [05/01/24 1920]   Temperature Heart Rate Respirations BP   37.5 °C (99.5 °F) (!) 124 18 120/74      Pulse Ox Temp Source Heart Rate Source Patient Position   95 % Tympanic Monitor --      BP Location FiO2 (%)     Left arm --       Physical Exam  Constitutional:       Comments: Patient is a generally well-appearing female that is alert and oriented x 3.   HENT:      Mouth/Throat:      Mouth: Mucous membranes are moist.      Pharynx: Oropharynx is clear.   Eyes:      Extraocular Movements: Extraocular movements intact.   Cardiovascular:      Rate and Rhythm: Regular rhythm. Tachycardia present.      Pulses: Normal pulses.      Heart sounds: Normal heart sounds.      Comments: Bedside ultrasound was performed by Dr. Melissa which demonstrated diastolic collapse with cardiac tamponade.  Hemodynamically stable currently.  Pulmonary:      Effort: Pulmonary effort is normal.      Breath sounds: Normal breath sounds.   Abdominal:      General: Abdomen is flat.      Palpations: Abdomen is soft.   Musculoskeletal:      Cervical back: Normal range of motion. No rigidity.   Neurological:      General: No focal deficit present.      Mental Status: She is alert and oriented to person, place, and time.         ED Course & MDM   ED Course as of 05/01/24 2148   Wed May 01, 2024   1959 XR chest 2 views  There are prominent interstitial  lung markings in the right upper lung zones suggesting pulmonary  edema/lymphangitic carcinomatosis.     [AH]   2013 Laboratory workup very well-appearing.  No leukocytosis or critical electrolyte disturbance.  No GÓMEZ.  Troponin and lactate unremarkable. [AH]   2042 This patient was seen by the advanced practice provider.  I have personally performed a substantiative portion of the encounter.  I have seen and examined the patient; I agree with the workup, evaluation, MDM, management and diagnosis.  The care plan was discussed.    I personally saw the patient and  made/approved the management plan and take responsibility for patient management:    My assessment revealed: 53-year-old female presented to the emergency department for tachycardia and feeling unwell she recently had an echo on 4/15 which showed a pericardial effusion with diastolic collapse.  She had tachycardia and felt short of breath thus called her cardiologist who recommended she present to the emergency department.  On my examination she is stable on blood pressure with a blood pressure of 120/74.  Patient is tachycardic in the 120s otherwise stable she is in no acute distress.  She is awake mentating appropriately.  Bedside ultrasound showed diastolic collapse of the RV.  Case was discussed with Dr. Giles and Dr. Higuera who has recommended the patient be admitted to the ICU with IV fluids with plan for operative intervention in the morning.  ICU agrees.  Ordered formal echocardiogram IV fluids were given of normal saline 1 L tachycardia is improving at this time.  Coags troponin BNP peptide lactate CBC CMP all were unremarkable for any acute findings and chest x-ray on my interpretation showed right hilar mass with opacification.  Patient is admitted to ICU at this time [ZS]      ED Course User Index  [AH] Nevaeh Lamb PA-C  [ZS] Asaf Melissa MD         Diagnoses as of 05/01/24 2148   Pericardial effusion ([facility])   Tachycardia       Medical Decision Making  Ayesha is a 53-year-old female with a past medical history of metastatic breast cancer to the lungs presenting to the emergency department at the discretion of her cardiologist in the setting of increased chest pain and shortness of breath after known pericardial effusion shown on TTE on 4/15/2024.  No syncope.    Patient is generally well-appearing with vital signs remarkable for tachycardia 124 and blood pressure 120/74.  Bedside ultrasound was immediately performed by Dr. Melissa which demonstrated diastolic collapse with concerns of cardiac  tamponade.  Cardiothoracic surgery, interventional cardiology, and ICU was contacted regarding this patient.  Ultimately, patient is currently hemodynamically stable and is decided to go forward with workup including CBC, CMP, mag, troponin x 2, BNP, lactate, APTT, PT/INR, type and screen, chest x-ray, EKG.  Plans for pericardial window in the a.m. as long as patient remains hemodynamically stable.  Admission to ICU accepted by Dr. Diana.         Procedure  Procedures     Nevaeh Lamb PA-C  05/01/24 9625

## 2024-05-02 ENCOUNTER — APPOINTMENT (OUTPATIENT)
Dept: RADIOLOGY | Facility: HOSPITAL | Age: 54
DRG: 598 | End: 2024-05-02
Payer: COMMERCIAL

## 2024-05-02 ENCOUNTER — APPOINTMENT (OUTPATIENT)
Dept: CARDIOLOGY | Facility: CLINIC | Age: 54
End: 2024-05-02
Payer: COMMERCIAL

## 2024-05-02 ENCOUNTER — ANESTHESIA (OUTPATIENT)
Dept: OPERATING ROOM | Facility: HOSPITAL | Age: 54
DRG: 598 | End: 2024-05-02
Payer: COMMERCIAL

## 2024-05-02 ENCOUNTER — APPOINTMENT (OUTPATIENT)
Dept: CARDIOLOGY | Facility: HOSPITAL | Age: 54
DRG: 598 | End: 2024-05-02
Payer: COMMERCIAL

## 2024-05-02 ENCOUNTER — ANESTHESIA EVENT (OUTPATIENT)
Dept: OPERATING ROOM | Facility: HOSPITAL | Age: 54
DRG: 598 | End: 2024-05-02
Payer: COMMERCIAL

## 2024-05-02 PROBLEM — I31.4 PERICARDIAL EFFUSION WITH CARDIAC TAMPONADE (HHS-HCC): Status: ACTIVE | Noted: 2024-03-15

## 2024-05-02 LAB
ABO GROUP (TYPE) IN BLOOD: NORMAL
ALBUMIN SERPL BCP-MCNC: 3.7 G/DL (ref 3.4–5)
ALBUMIN SERPL BCP-MCNC: 3.8 G/DL (ref 3.4–5)
ALP SERPL-CCNC: 39 U/L (ref 33–110)
ALT SERPL W P-5'-P-CCNC: 40 U/L (ref 7–45)
ANION GAP BLDA CALCULATED.4IONS-SCNC: 16 MMO/L (ref 10–25)
ANION GAP SERPL CALC-SCNC: 13 MMOL/L (ref 10–20)
ANION GAP SERPL CALC-SCNC: 15 MMOL/L (ref 10–20)
APPEARANCE UR: CLEAR
APTT PPP: 29 SECONDS (ref 27–38)
APTT PPP: 31 SECONDS (ref 27–38)
AST SERPL W P-5'-P-CCNC: 65 U/L (ref 9–39)
BASE EXCESS BLDA CALC-SCNC: -7 MMOL/L (ref -2–3)
BILIRUB SERPL-MCNC: 0.8 MG/DL (ref 0–1.2)
BILIRUB UR STRIP.AUTO-MCNC: NEGATIVE MG/DL
BODY TEMPERATURE: 37 DEGREES CELSIUS
BUN SERPL-MCNC: 5 MG/DL (ref 6–23)
BUN SERPL-MCNC: 6 MG/DL (ref 6–23)
CA-I BLD-SCNC: 1.17 MMOL/L (ref 1.1–1.33)
CA-I BLDA-SCNC: 1.15 MMOL/L (ref 1.1–1.33)
CALCIUM SERPL-MCNC: 8.3 MG/DL (ref 8.6–10.3)
CALCIUM SERPL-MCNC: 8.9 MG/DL (ref 8.6–10.3)
CHLORIDE BLDA-SCNC: 106 MMOL/L (ref 98–107)
CHLORIDE SERPL-SCNC: 107 MMOL/L (ref 98–107)
CHLORIDE SERPL-SCNC: 108 MMOL/L (ref 98–107)
CO2 SERPL-SCNC: 20 MMOL/L (ref 21–32)
CO2 SERPL-SCNC: 21 MMOL/L (ref 21–32)
COLOR UR: YELLOW
CREAT SERPL-MCNC: 0.29 MG/DL (ref 0.5–1.05)
CREAT SERPL-MCNC: 0.6 MG/DL (ref 0.5–1.05)
EGFRCR SERPLBLD CKD-EPI 2021: >90 ML/MIN/1.73M*2
EGFRCR SERPLBLD CKD-EPI 2021: >90 ML/MIN/1.73M*2
ERYTHROCYTE [DISTWIDTH] IN BLOOD BY AUTOMATED COUNT: 13.7 % (ref 11.5–14.5)
ERYTHROCYTE [DISTWIDTH] IN BLOOD BY AUTOMATED COUNT: 14 % (ref 11.5–14.5)
FIBRINOGEN PPP-MCNC: 410 MG/DL (ref 200–400)
FIBRINOGEN PPP-MCNC: 485 MG/DL (ref 200–400)
GLUCOSE BLD MANUAL STRIP-MCNC: 112 MG/DL (ref 74–99)
GLUCOSE BLD MANUAL STRIP-MCNC: 119 MG/DL (ref 74–99)
GLUCOSE BLD MANUAL STRIP-MCNC: 124 MG/DL (ref 74–99)
GLUCOSE BLD MANUAL STRIP-MCNC: 180 MG/DL (ref 74–99)
GLUCOSE BLD MANUAL STRIP-MCNC: 189 MG/DL (ref 74–99)
GLUCOSE BLDA-MCNC: 219 MG/DL (ref 74–99)
GLUCOSE SERPL-MCNC: 134 MG/DL (ref 74–99)
GLUCOSE SERPL-MCNC: 214 MG/DL (ref 74–99)
GLUCOSE UR STRIP.AUTO-MCNC: NEGATIVE MG/DL
HCO3 BLDA-SCNC: 18.6 MMOL/L (ref 22–26)
HCT VFR BLD AUTO: 33.4 % (ref 36–46)
HCT VFR BLD AUTO: 36 % (ref 36–46)
HCT VFR BLD EST: 36 % (ref 36–46)
HGB BLD-MCNC: 10.9 G/DL (ref 12–16)
HGB BLD-MCNC: 11.9 G/DL (ref 12–16)
HGB BLDA-MCNC: 12.1 G/DL (ref 12–16)
INHALED O2 CONCENTRATION: 40 %
INR PPP: 1.3 (ref 0.9–1.1)
INR PPP: 1.3 (ref 0.9–1.1)
KETONES UR STRIP.AUTO-MCNC: ABNORMAL MG/DL
LACTATE BLDA-SCNC: 1.5 MMOL/L (ref 0.4–2)
LACTATE SERPL-SCNC: 1.1 MMOL/L (ref 0.4–2)
LEUKOCYTE ESTERASE UR QL STRIP.AUTO: NEGATIVE
MAGNESIUM SERPL-MCNC: 1.74 MG/DL (ref 1.6–2.4)
MAGNESIUM SERPL-MCNC: 1.8 MG/DL (ref 1.6–2.4)
MCH RBC QN AUTO: 29.1 PG (ref 26–34)
MCH RBC QN AUTO: 29.5 PG (ref 26–34)
MCHC RBC AUTO-ENTMCNC: 32.6 G/DL (ref 32–36)
MCHC RBC AUTO-ENTMCNC: 33.1 G/DL (ref 32–36)
MCV RBC AUTO: 89 FL (ref 80–100)
MCV RBC AUTO: 89 FL (ref 80–100)
NITRITE UR QL STRIP.AUTO: NEGATIVE
NRBC BLD-RTO: 0 /100 WBCS (ref 0–0)
NRBC BLD-RTO: 0 /100 WBCS (ref 0–0)
OXYHGB MFR BLDA: 94.1 % (ref 94–98)
PCO2 BLDA: 37 MM HG (ref 38–42)
PH BLDA: 7.31 PH (ref 7.38–7.42)
PH UR STRIP.AUTO: 6 [PH]
PHOSPHATE SERPL-MCNC: 3.5 MG/DL (ref 2.5–4.9)
PLATELET # BLD AUTO: 227 X10*3/UL (ref 150–450)
PLATELET # BLD AUTO: 237 X10*3/UL (ref 150–450)
PLATELET # BLD AUTO: 244 X10*3/UL (ref 150–450)
PO2 BLDA: 78 MM HG (ref 85–95)
POTASSIUM BLDA-SCNC: 3.7 MMOL/L (ref 3.5–5.3)
POTASSIUM SERPL-SCNC: 3.7 MMOL/L (ref 3.5–5.3)
POTASSIUM SERPL-SCNC: 3.7 MMOL/L (ref 3.5–5.3)
PROT SERPL-MCNC: 6.6 G/DL (ref 6.4–8.2)
PROT UR STRIP.AUTO-MCNC: NEGATIVE MG/DL
PROTHROMBIN TIME: 14.4 SECONDS (ref 9.8–12.8)
PROTHROMBIN TIME: 14.7 SECONDS (ref 9.8–12.8)
RBC # BLD AUTO: 3.74 X10*6/UL (ref 4–5.2)
RBC # BLD AUTO: 4.04 X10*6/UL (ref 4–5.2)
RBC # UR STRIP.AUTO: NEGATIVE /UL
RH FACTOR (ANTIGEN D): NORMAL
SAO2 % BLDA: 97 % (ref 94–100)
SODIUM BLDA-SCNC: 137 MMOL/L (ref 136–145)
SODIUM SERPL-SCNC: 138 MMOL/L (ref 136–145)
SODIUM SERPL-SCNC: 138 MMOL/L (ref 136–145)
SP GR UR STRIP.AUTO: 1.01
SPECIMEN DRAWN FROM PATIENT: ABNORMAL
UROBILINOGEN UR STRIP.AUTO-MCNC: <2 MG/DL
WBC # BLD AUTO: 6.5 X10*3/UL (ref 4.4–11.3)
WBC # BLD AUTO: 7.4 X10*3/UL (ref 4.4–11.3)

## 2024-05-02 PROCEDURE — 84132 ASSAY OF SERUM POTASSIUM: CPT | Performed by: STUDENT IN AN ORGANIZED HEALTH CARE EDUCATION/TRAINING PROGRAM

## 2024-05-02 PROCEDURE — A33025 PR INCIS HEART SAC WINDW FOR DRAIN: Performed by: ANESTHESIOLOGY

## 2024-05-02 PROCEDURE — 93308 TTE F-UP OR LMTD: CPT | Performed by: INTERNAL MEDICINE

## 2024-05-02 PROCEDURE — 99222 1ST HOSP IP/OBS MODERATE 55: CPT | Performed by: STUDENT IN AN ORGANIZED HEALTH CARE EDUCATION/TRAINING PROGRAM

## 2024-05-02 PROCEDURE — 2500000004 HC RX 250 GENERAL PHARMACY W/ HCPCS (ALT 636 FOR OP/ED): Performed by: NURSE PRACTITIONER

## 2024-05-02 PROCEDURE — A33025 PR INCIS HEART SAC WINDW FOR DRAIN: Performed by: NURSE ANESTHETIST, CERTIFIED REGISTERED

## 2024-05-02 PROCEDURE — 3700000002 HC GENERAL ANESTHESIA TIME - EACH INCREMENTAL 1 MINUTE: Performed by: THORACIC SURGERY (CARDIOTHORACIC VASCULAR SURGERY)

## 2024-05-02 PROCEDURE — 88342 IMHCHEM/IMCYTCHM 1ST ANTB: CPT | Performed by: PATHOLOGY

## 2024-05-02 PROCEDURE — 85049 AUTOMATED PLATELET COUNT: CPT | Performed by: THORACIC SURGERY (CARDIOTHORACIC VASCULAR SURGERY)

## 2024-05-02 PROCEDURE — 88304 TISSUE EXAM BY PATHOLOGIST: CPT | Performed by: PATHOLOGY

## 2024-05-02 PROCEDURE — 0760T DGTZ GLS MCRSCP SL IMM 1ST: CPT | Mod: TC,PARLAB | Performed by: THORACIC SURGERY (CARDIOTHORACIC VASCULAR SURGERY)

## 2024-05-02 PROCEDURE — 2500000002 HC RX 250 W HCPCS SELF ADMINISTERED DRUGS (ALT 637 FOR MEDICARE OP, ALT 636 FOR OP/ED): Performed by: STUDENT IN AN ORGANIZED HEALTH CARE EDUCATION/TRAINING PROGRAM

## 2024-05-02 PROCEDURE — 84132 ASSAY OF SERUM POTASSIUM: CPT | Performed by: NURSE PRACTITIONER

## 2024-05-02 PROCEDURE — 2500000004 HC RX 250 GENERAL PHARMACY W/ HCPCS (ALT 636 FOR OP/ED): Performed by: HOSPITALIST

## 2024-05-02 PROCEDURE — 37799 UNLISTED PX VASCULAR SURGERY: CPT | Performed by: THORACIC SURGERY (CARDIOTHORACIC VASCULAR SURGERY)

## 2024-05-02 PROCEDURE — 99223 1ST HOSP IP/OBS HIGH 75: CPT | Performed by: THORACIC SURGERY (CARDIOTHORACIC VASCULAR SURGERY)

## 2024-05-02 PROCEDURE — 85730 THROMBOPLASTIN TIME PARTIAL: CPT | Performed by: THORACIC SURGERY (CARDIOTHORACIC VASCULAR SURGERY)

## 2024-05-02 PROCEDURE — 88360 TUMOR IMMUNOHISTOCHEM/MANUAL: CPT | Mod: TC | Performed by: THORACIC SURGERY (CARDIOTHORACIC VASCULAR SURGERY)

## 2024-05-02 PROCEDURE — 81003 URINALYSIS AUTO W/O SCOPE: CPT | Performed by: STUDENT IN AN ORGANIZED HEALTH CARE EDUCATION/TRAINING PROGRAM

## 2024-05-02 PROCEDURE — 85027 COMPLETE CBC AUTOMATED: CPT | Performed by: NURSE PRACTITIONER

## 2024-05-02 PROCEDURE — 2720000007 HC OR 272 NO HCPCS: Performed by: THORACIC SURGERY (CARDIOTHORACIC VASCULAR SURGERY)

## 2024-05-02 PROCEDURE — 82330 ASSAY OF CALCIUM: CPT | Performed by: NURSE PRACTITIONER

## 2024-05-02 PROCEDURE — 37799 UNLISTED PX VASCULAR SURGERY: CPT | Performed by: STUDENT IN AN ORGANIZED HEALTH CARE EDUCATION/TRAINING PROGRAM

## 2024-05-02 PROCEDURE — 93005 ELECTROCARDIOGRAM TRACING: CPT

## 2024-05-02 PROCEDURE — 85610 PROTHROMBIN TIME: CPT | Performed by: NURSE PRACTITIONER

## 2024-05-02 PROCEDURE — 87070 CULTURE OTHR SPECIMN AEROBIC: CPT | Mod: PARLAB | Performed by: THORACIC SURGERY (CARDIOTHORACIC VASCULAR SURGERY)

## 2024-05-02 PROCEDURE — 0W9D30Z DRAINAGE OF PERICARDIAL CAVITY WITH DRAINAGE DEVICE, PERCUTANEOUS APPROACH: ICD-10-PCS | Performed by: THORACIC SURGERY (CARDIOTHORACIC VASCULAR SURGERY)

## 2024-05-02 PROCEDURE — 85384 FIBRINOGEN ACTIVITY: CPT | Performed by: THORACIC SURGERY (CARDIOTHORACIC VASCULAR SURGERY)

## 2024-05-02 PROCEDURE — 3600000009 HC OR TIME - EACH INCREMENTAL 1 MINUTE - PROCEDURE LEVEL FOUR: Performed by: THORACIC SURGERY (CARDIOTHORACIC VASCULAR SURGERY)

## 2024-05-02 PROCEDURE — 85384 FIBRINOGEN ACTIVITY: CPT | Performed by: NURSE PRACTITIONER

## 2024-05-02 PROCEDURE — 2500000004 HC RX 250 GENERAL PHARMACY W/ HCPCS (ALT 636 FOR OP/ED)

## 2024-05-02 PROCEDURE — 2500000004 HC RX 250 GENERAL PHARMACY W/ HCPCS (ALT 636 FOR OP/ED): Performed by: NURSE ANESTHETIST, CERTIFIED REGISTERED

## 2024-05-02 PROCEDURE — 3600000004 HC OR TIME - INITIAL BASE CHARGE - PROCEDURE LEVEL FOUR: Performed by: THORACIC SURGERY (CARDIOTHORACIC VASCULAR SURGERY)

## 2024-05-02 PROCEDURE — 88360 TUMOR IMMUNOHISTOCHEM/MANUAL: CPT | Performed by: PATHOLOGY

## 2024-05-02 PROCEDURE — A4217 STERILE WATER/SALINE, 500 ML: HCPCS | Performed by: STUDENT IN AN ORGANIZED HEALTH CARE EDUCATION/TRAINING PROGRAM

## 2024-05-02 PROCEDURE — 83735 ASSAY OF MAGNESIUM: CPT | Performed by: NURSE PRACTITIONER

## 2024-05-02 PROCEDURE — 71045 X-RAY EXAM CHEST 1 VIEW: CPT | Performed by: RADIOLOGY

## 2024-05-02 PROCEDURE — 2500000004 HC RX 250 GENERAL PHARMACY W/ HCPCS (ALT 636 FOR OP/ED): Performed by: STUDENT IN AN ORGANIZED HEALTH CARE EDUCATION/TRAINING PROGRAM

## 2024-05-02 PROCEDURE — 83735 ASSAY OF MAGNESIUM: CPT | Performed by: THORACIC SURGERY (CARDIOTHORACIC VASCULAR SURGERY)

## 2024-05-02 PROCEDURE — 2500000005 HC RX 250 GENERAL PHARMACY W/O HCPCS: Performed by: THORACIC SURGERY (CARDIOTHORACIC VASCULAR SURGERY)

## 2024-05-02 PROCEDURE — 85027 COMPLETE CBC AUTOMATED: CPT | Performed by: STUDENT IN AN ORGANIZED HEALTH CARE EDUCATION/TRAINING PROGRAM

## 2024-05-02 PROCEDURE — A4217 STERILE WATER/SALINE, 500 ML: HCPCS | Performed by: THORACIC SURGERY (CARDIOTHORACIC VASCULAR SURGERY)

## 2024-05-02 PROCEDURE — 2500000005 HC RX 250 GENERAL PHARMACY W/O HCPCS: Performed by: NURSE ANESTHETIST, CERTIFIED REGISTERED

## 2024-05-02 PROCEDURE — 93321 DOPPLER ECHO F-UP/LMTD STD: CPT | Performed by: INTERNAL MEDICINE

## 2024-05-02 PROCEDURE — 94640 AIRWAY INHALATION TREATMENT: CPT

## 2024-05-02 PROCEDURE — 82947 ASSAY GLUCOSE BLOOD QUANT: CPT

## 2024-05-02 PROCEDURE — 88305 TISSUE EXAM BY PATHOLOGIST: CPT | Performed by: PATHOLOGY

## 2024-05-02 PROCEDURE — 2020000001 HC ICU ROOM DAILY

## 2024-05-02 PROCEDURE — 71045 X-RAY EXAM CHEST 1 VIEW: CPT

## 2024-05-02 PROCEDURE — 2500000001 HC RX 250 WO HCPCS SELF ADMINISTERED DRUGS (ALT 637 FOR MEDICARE OP): Performed by: NURSE PRACTITIONER

## 2024-05-02 PROCEDURE — 3600000011 HC PERFUSION TIME - INITIAL BASE CHARGE: Performed by: THORACIC SURGERY (CARDIOTHORACIC VASCULAR SURGERY)

## 2024-05-02 PROCEDURE — 93321 DOPPLER ECHO F-UP/LMTD STD: CPT

## 2024-05-02 PROCEDURE — 8E0W4CZ ROBOTIC ASSISTED PROCEDURE OF TRUNK REGION, PERCUTANEOUS ENDOSCOPIC APPROACH: ICD-10-PCS | Performed by: THORACIC SURGERY (CARDIOTHORACIC VASCULAR SURGERY)

## 2024-05-02 PROCEDURE — 85610 PROTHROMBIN TIME: CPT | Performed by: THORACIC SURGERY (CARDIOTHORACIC VASCULAR SURGERY)

## 2024-05-02 PROCEDURE — 88341 IMHCHEM/IMCYTCHM EA ADD ANTB: CPT | Performed by: PATHOLOGY

## 2024-05-02 PROCEDURE — 83605 ASSAY OF LACTIC ACID: CPT | Performed by: STUDENT IN AN ORGANIZED HEALTH CARE EDUCATION/TRAINING PROGRAM

## 2024-05-02 PROCEDURE — 32659 THORACOSCOPY W/SAC DRAINAGE: CPT | Performed by: THORACIC SURGERY (CARDIOTHORACIC VASCULAR SURGERY)

## 2024-05-02 PROCEDURE — 2500000005 HC RX 250 GENERAL PHARMACY W/O HCPCS: Performed by: NURSE PRACTITIONER

## 2024-05-02 PROCEDURE — 88112 CYTOPATH CELL ENHANCE TECH: CPT | Performed by: PATHOLOGY

## 2024-05-02 PROCEDURE — 82805 BLOOD GASES W/O2 SATURATION: CPT | Performed by: STUDENT IN AN ORGANIZED HEALTH CARE EDUCATION/TRAINING PROGRAM

## 2024-05-02 PROCEDURE — 3700000001 HC GENERAL ANESTHESIA TIME - INITIAL BASE CHARGE: Performed by: THORACIC SURGERY (CARDIOTHORACIC VASCULAR SURGERY)

## 2024-05-02 PROCEDURE — 3600000012 HC PERFUSION TIME - EACH INCREMENTAL 1 MINUTE: Performed by: THORACIC SURGERY (CARDIOTHORACIC VASCULAR SURGERY)

## 2024-05-02 PROCEDURE — 2500000004 HC RX 250 GENERAL PHARMACY W/ HCPCS (ALT 636 FOR OP/ED): Performed by: THORACIC SURGERY (CARDIOTHORACIC VASCULAR SURGERY)

## 2024-05-02 PROCEDURE — 87081 CULTURE SCREEN ONLY: CPT | Mod: PARLAB | Performed by: NURSE PRACTITIONER

## 2024-05-02 RX ORDER — LIDOCAINE HCL/PF 100 MG/5ML
SYRINGE (ML) INTRAVENOUS AS NEEDED
Status: DISCONTINUED | OUTPATIENT
Start: 2024-05-02 | End: 2024-05-02

## 2024-05-02 RX ORDER — FENTANYL CITRATE 50 UG/ML
INJECTION, SOLUTION INTRAMUSCULAR; INTRAVENOUS AS NEEDED
Status: DISCONTINUED | OUTPATIENT
Start: 2024-05-02 | End: 2024-05-02

## 2024-05-02 RX ORDER — ENOXAPARIN SODIUM 100 MG/ML
40 INJECTION SUBCUTANEOUS DAILY
Status: CANCELLED | OUTPATIENT
Start: 2024-05-03

## 2024-05-02 RX ORDER — CEFAZOLIN SODIUM 2 G/100ML
2 INJECTION, SOLUTION INTRAVENOUS EVERY 8 HOURS
Status: DISPENSED | OUTPATIENT
Start: 2024-05-02 | End: 2024-05-04

## 2024-05-02 RX ORDER — BUPIVACAINE HCL/EPINEPHRINE 0.25-.0005
VIAL (ML) INJECTION AS NEEDED
Status: DISCONTINUED | OUTPATIENT
Start: 2024-05-02 | End: 2024-05-02 | Stop reason: HOSPADM

## 2024-05-02 RX ORDER — ONDANSETRON HYDROCHLORIDE 2 MG/ML
4 INJECTION, SOLUTION INTRAVENOUS EVERY 8 HOURS PRN
Status: DISCONTINUED | OUTPATIENT
Start: 2024-05-02 | End: 2024-05-04 | Stop reason: HOSPADM

## 2024-05-02 RX ORDER — SUCCINYLCHOLINE/SOD CL,ISO/PF 100 MG/5ML
SYRINGE (ML) INTRAVENOUS AS NEEDED
Status: DISCONTINUED | OUTPATIENT
Start: 2024-05-02 | End: 2024-05-02

## 2024-05-02 RX ORDER — DEXAMETHASONE SODIUM PHOSPHATE 4 MG/ML
8 INJECTION, SOLUTION INTRA-ARTICULAR; INTRALESIONAL; INTRAMUSCULAR; INTRAVENOUS; SOFT TISSUE EVERY 8 HOURS
Status: DISCONTINUED | OUTPATIENT
Start: 2024-05-02 | End: 2024-05-02

## 2024-05-02 RX ORDER — OXYCODONE HYDROCHLORIDE 5 MG/1
5 TABLET ORAL EVERY 4 HOURS PRN
Status: CANCELLED | OUTPATIENT
Start: 2024-05-02

## 2024-05-02 RX ORDER — NALOXONE HYDROCHLORIDE 1 MG/ML
0.2 INJECTION INTRAMUSCULAR; INTRAVENOUS; SUBCUTANEOUS EVERY 5 MIN PRN
Status: DISCONTINUED | OUTPATIENT
Start: 2024-05-02 | End: 2024-05-04 | Stop reason: HOSPADM

## 2024-05-02 RX ORDER — OXYCODONE HYDROCHLORIDE 5 MG/1
10 TABLET ORAL EVERY 4 HOURS PRN
Status: CANCELLED | OUTPATIENT
Start: 2024-05-02

## 2024-05-02 RX ORDER — FAMOTIDINE 10 MG/ML
20 INJECTION INTRAVENOUS EVERY 12 HOURS SCHEDULED
Status: DISCONTINUED | OUTPATIENT
Start: 2024-05-02 | End: 2024-05-04 | Stop reason: HOSPADM

## 2024-05-02 RX ORDER — ACETAMINOPHEN 325 MG/1
650 TABLET ORAL EVERY 6 HOURS
Status: CANCELLED | OUTPATIENT
Start: 2024-05-02

## 2024-05-02 RX ORDER — ENOXAPARIN SODIUM 100 MG/ML
40 INJECTION SUBCUTANEOUS DAILY
Status: DISCONTINUED | OUTPATIENT
Start: 2024-05-03 | End: 2024-05-04 | Stop reason: HOSPADM

## 2024-05-02 RX ORDER — AMOXICILLIN 250 MG
2 CAPSULE ORAL 2 TIMES DAILY
Status: DISCONTINUED | OUTPATIENT
Start: 2024-05-02 | End: 2024-05-04 | Stop reason: HOSPADM

## 2024-05-02 RX ORDER — CETIRIZINE HYDROCHLORIDE 5 MG/1
10 TABLET ORAL ONCE
Status: DISCONTINUED | OUTPATIENT
Start: 2024-05-02 | End: 2024-05-02

## 2024-05-02 RX ORDER — AMOXICILLIN 250 MG
2 CAPSULE ORAL 2 TIMES DAILY
Status: CANCELLED | OUTPATIENT
Start: 2024-05-02

## 2024-05-02 RX ORDER — LIDOCAINE 560 MG/1
1 PATCH PERCUTANEOUS; TOPICAL; TRANSDERMAL EVERY 24 HOURS
Status: DISCONTINUED | OUTPATIENT
Start: 2024-05-02 | End: 2024-05-04 | Stop reason: HOSPADM

## 2024-05-02 RX ORDER — IPRATROPIUM BROMIDE AND ALBUTEROL SULFATE 2.5; .5 MG/3ML; MG/3ML
3 SOLUTION RESPIRATORY (INHALATION)
Status: DISCONTINUED | OUTPATIENT
Start: 2024-05-02 | End: 2024-05-04 | Stop reason: HOSPADM

## 2024-05-02 RX ORDER — OXYCODONE HYDROCHLORIDE 5 MG/1
10 TABLET ORAL EVERY 4 HOURS PRN
Status: DISCONTINUED | OUTPATIENT
Start: 2024-05-02 | End: 2024-05-02

## 2024-05-02 RX ORDER — ALBUTEROL SULFATE 0.83 MG/ML
2.5 SOLUTION RESPIRATORY (INHALATION) EVERY 2 HOUR PRN
Status: DISCONTINUED | OUTPATIENT
Start: 2024-05-02 | End: 2024-05-04 | Stop reason: HOSPADM

## 2024-05-02 RX ORDER — CEFAZOLIN SODIUM 2 G/100ML
2 INJECTION, SOLUTION INTRAVENOUS EVERY 8 HOURS
Status: CANCELLED | OUTPATIENT
Start: 2024-05-02 | End: 2024-05-04

## 2024-05-02 RX ORDER — NALOXONE HYDROCHLORIDE 1 MG/ML
0.2 INJECTION INTRAMUSCULAR; INTRAVENOUS; SUBCUTANEOUS EVERY 5 MIN PRN
Status: CANCELLED | OUTPATIENT
Start: 2024-05-02

## 2024-05-02 RX ORDER — FENTANYL CITRATE 50 UG/ML
25 INJECTION, SOLUTION INTRAMUSCULAR; INTRAVENOUS
Status: DISCONTINUED | OUTPATIENT
Start: 2024-05-02 | End: 2024-05-02

## 2024-05-02 RX ORDER — ROCURONIUM BROMIDE 10 MG/ML
INJECTION, SOLUTION INTRAVENOUS AS NEEDED
Status: DISCONTINUED | OUTPATIENT
Start: 2024-05-02 | End: 2024-05-02

## 2024-05-02 RX ORDER — OXYCODONE HYDROCHLORIDE 5 MG/1
5 TABLET ORAL EVERY 4 HOURS PRN
Status: DISCONTINUED | OUTPATIENT
Start: 2024-05-02 | End: 2024-05-02

## 2024-05-02 RX ORDER — ONDANSETRON HYDROCHLORIDE 2 MG/ML
INJECTION, SOLUTION INTRAVENOUS AS NEEDED
Status: DISCONTINUED | OUTPATIENT
Start: 2024-05-02 | End: 2024-05-02

## 2024-05-02 RX ORDER — SODIUM CHLORIDE, SODIUM LACTATE, POTASSIUM CHLORIDE, CALCIUM CHLORIDE 600; 310; 30; 20 MG/100ML; MG/100ML; MG/100ML; MG/100ML
5 INJECTION, SOLUTION INTRAVENOUS CONTINUOUS
Status: DISCONTINUED | OUTPATIENT
Start: 2024-05-02 | End: 2024-05-04 | Stop reason: HOSPADM

## 2024-05-02 RX ORDER — CEFAZOLIN 1 G/1
INJECTION, POWDER, FOR SOLUTION INTRAVENOUS AS NEEDED
Status: DISCONTINUED | OUTPATIENT
Start: 2024-05-02 | End: 2024-05-02

## 2024-05-02 RX ORDER — DIPHENHYDRAMINE HCL 25 MG
25 CAPSULE ORAL EVERY 6 HOURS PRN
Status: DISCONTINUED | OUTPATIENT
Start: 2024-05-02 | End: 2024-05-02

## 2024-05-02 RX ORDER — ETOMIDATE 2 MG/ML
INJECTION INTRAVENOUS AS NEEDED
Status: DISCONTINUED | OUTPATIENT
Start: 2024-05-02 | End: 2024-05-02

## 2024-05-02 RX ORDER — SODIUM CHLORIDE 0.9 G/100ML
IRRIGANT IRRIGATION AS NEEDED
Status: DISCONTINUED | OUTPATIENT
Start: 2024-05-02 | End: 2024-05-02 | Stop reason: HOSPADM

## 2024-05-02 RX ORDER — ACETAMINOPHEN 325 MG/1
650 TABLET ORAL EVERY 6 HOURS
Status: DISCONTINUED | OUTPATIENT
Start: 2024-05-02 | End: 2024-05-04 | Stop reason: HOSPADM

## 2024-05-02 RX ADMIN — SODIUM CHLORIDE, POTASSIUM CHLORIDE, SODIUM LACTATE AND CALCIUM CHLORIDE 5 ML/HR: 600; 310; 30; 20 INJECTION, SOLUTION INTRAVENOUS at 11:13

## 2024-05-02 RX ADMIN — FENTANYL CITRATE 50 MCG: 50 INJECTION, SOLUTION INTRAMUSCULAR; INTRAVENOUS at 09:53

## 2024-05-02 RX ADMIN — LIDOCAINE HYDROCHLORIDE 100 MG: 20 INJECTION INTRAVENOUS at 07:54

## 2024-05-02 RX ADMIN — ACETAMINOPHEN 650 MG: 325 TABLET ORAL at 20:37

## 2024-05-02 RX ADMIN — FAMOTIDINE 20 MG: 10 INJECTION, SOLUTION INTRAVENOUS at 01:40

## 2024-05-02 RX ADMIN — ONDANSETRON 4 MG: 2 INJECTION INTRAMUSCULAR; INTRAVENOUS at 09:40

## 2024-05-02 RX ADMIN — ROCURONIUM BROMIDE 20 MG: 10 INJECTION, SOLUTION INTRAVENOUS at 08:48

## 2024-05-02 RX ADMIN — ONDANSETRON 4 MG: 2 INJECTION INTRAMUSCULAR; INTRAVENOUS at 14:18

## 2024-05-02 RX ADMIN — FAMOTIDINE 20 MG: 10 INJECTION, SOLUTION INTRAVENOUS at 20:37

## 2024-05-02 RX ADMIN — CEFAZOLIN SODIUM 2 G: 2 INJECTION, SOLUTION INTRAVENOUS at 15:48

## 2024-05-02 RX ADMIN — HYDROMORPHONE HYDROCHLORIDE 0.2 MG: 0.2 INJECTION, SOLUTION INTRAMUSCULAR; INTRAVENOUS; SUBCUTANEOUS at 20:37

## 2024-05-02 RX ADMIN — FENTANYL CITRATE 50 MCG: 50 INJECTION, SOLUTION INTRAMUSCULAR; INTRAVENOUS at 08:41

## 2024-05-02 RX ADMIN — IPRATROPIUM BROMIDE AND ALBUTEROL SULFATE 3 ML: .5; 3 SOLUTION RESPIRATORY (INHALATION) at 01:56

## 2024-05-02 RX ADMIN — DEXAMETHASONE SODIUM PHOSPHATE 4 MG: 4 INJECTION, SOLUTION INTRAMUSCULAR; INTRAVENOUS at 01:30

## 2024-05-02 RX ADMIN — FENTANYL CITRATE 50 MCG: 50 INJECTION, SOLUTION INTRAMUSCULAR; INTRAVENOUS at 10:08

## 2024-05-02 RX ADMIN — LIDOCAINE 4% 1 PATCH: 40 PATCH TOPICAL at 11:12

## 2024-05-02 RX ADMIN — SUGAMMADEX 200 MG: 100 INJECTION, SOLUTION INTRAVENOUS at 09:45

## 2024-05-02 RX ADMIN — SODIUM CHLORIDE, POTASSIUM CHLORIDE, SODIUM LACTATE AND CALCIUM CHLORIDE 1000 ML: 600; 310; 30; 20 INJECTION, SOLUTION INTRAVENOUS at 01:30

## 2024-05-02 RX ADMIN — SODIUM CHLORIDE: 9 INJECTION, SOLUTION INTRAVENOUS at 07:38

## 2024-05-02 RX ADMIN — ROCURONIUM BROMIDE 20 MG: 10 INJECTION, SOLUTION INTRAVENOUS at 08:30

## 2024-05-02 RX ADMIN — ROCURONIUM BROMIDE 10 MG: 10 INJECTION, SOLUTION INTRAVENOUS at 09:13

## 2024-05-02 RX ADMIN — VANCOMYCIN HYDROCHLORIDE 1750 MG: 10 INJECTION, POWDER, LYOPHILIZED, FOR SOLUTION INTRAVENOUS at 00:01

## 2024-05-02 RX ADMIN — ETOMIDATE 14 MG: 20 INJECTION, SOLUTION INTRAVENOUS at 07:54

## 2024-05-02 RX ADMIN — ROCURONIUM BROMIDE 50 MG: 10 INJECTION, SOLUTION INTRAVENOUS at 07:54

## 2024-05-02 RX ADMIN — DEXAMETHASONE SODIUM PHOSPHATE 4 MG: 4 INJECTION, SOLUTION INTRAMUSCULAR; INTRAVENOUS at 15:48

## 2024-05-02 RX ADMIN — HYDROMORPHONE HYDROCHLORIDE 0.2 MG: 0.2 INJECTION, SOLUTION INTRAMUSCULAR; INTRAVENOUS; SUBCUTANEOUS at 16:19

## 2024-05-02 RX ADMIN — DEXAMETHASONE SODIUM PHOSPHATE 4 MG: 4 INJECTION, SOLUTION INTRAMUSCULAR; INTRAVENOUS at 03:42

## 2024-05-02 RX ADMIN — FENTANYL CITRATE 50 MCG: 50 INJECTION, SOLUTION INTRAMUSCULAR; INTRAVENOUS at 10:15

## 2024-05-02 RX ADMIN — FENTANYL CITRATE 100 MCG: 50 INJECTION, SOLUTION INTRAMUSCULAR; INTRAVENOUS at 07:54

## 2024-05-02 RX ADMIN — SENNOSIDES AND DOCUSATE SODIUM 2 TABLET: 50; 8.6 TABLET ORAL at 20:37

## 2024-05-02 RX ADMIN — DEXAMETHASONE SODIUM PHOSPHATE 4 MG: 4 INJECTION, SOLUTION INTRAMUSCULAR; INTRAVENOUS at 08:24

## 2024-05-02 RX ADMIN — Medication 140 MG: at 07:54

## 2024-05-02 RX ADMIN — SODIUM CHLORIDE: 9 INJECTION, SOLUTION INTRAVENOUS at 09:18

## 2024-05-02 RX ADMIN — ACETAMINOPHEN 650 MG: 325 TABLET ORAL at 14:03

## 2024-05-02 RX ADMIN — CEFAZOLIN 2 G: 330 INJECTION, POWDER, FOR SOLUTION INTRAMUSCULAR; INTRAVENOUS at 08:00

## 2024-05-02 SDOH — HEALTH STABILITY: MENTAL HEALTH: CURRENT SMOKER: 0

## 2024-05-02 ASSESSMENT — PAIN - FUNCTIONAL ASSESSMENT
PAIN_FUNCTIONAL_ASSESSMENT: 0-10
PAIN_FUNCTIONAL_ASSESSMENT: CPOT (CRITICAL CARE PAIN OBSERVATION TOOL)

## 2024-05-02 ASSESSMENT — ENCOUNTER SYMPTOMS
DYSPNEA ON EXERTION: 1
ENDOCRINE NEGATIVE: 1
HEMATOLOGIC/LYMPHATIC NEGATIVE: 1
RESPIRATORY NEGATIVE: 1
GASTROINTESTINAL NEGATIVE: 1
SYNCOPE: 0
PND: 0
ORTHOPNEA: 0
MUSCULOSKELETAL NEGATIVE: 1
ALLERGIC/IMMUNOLOGIC NEGATIVE: 1
NEUROLOGICAL NEGATIVE: 1
PSYCHIATRIC NEGATIVE: 1
EYES NEGATIVE: 1
PALPITATIONS: 0
NEAR-SYNCOPE: 0

## 2024-05-02 ASSESSMENT — ACTIVITIES OF DAILY LIVING (ADL): LACK_OF_TRANSPORTATION: NO

## 2024-05-02 ASSESSMENT — PAIN SCALES - GENERAL
PAINLEVEL_OUTOF10: 7
PAINLEVEL_OUTOF10: 5 - MODERATE PAIN
PAINLEVEL_OUTOF10: 0 - NO PAIN
PAINLEVEL_OUTOF10: 10 - WORST POSSIBLE PAIN
PAINLEVEL_OUTOF10: 1

## 2024-05-02 ASSESSMENT — PAIN DESCRIPTION - LOCATION: LOCATION: CHEST

## 2024-05-02 NOTE — ANESTHESIA POSTPROCEDURE EVALUATION
Patient: Ayesha Nova    Procedure Summary       Date: 05/02/24 Room / Location: [facility] OR 10 / Virtual [facility] OR    Anesthesia Start: 0738 Anesthesia Stop:     Procedures:       Creation Pericardial Window (Right: Chest)      Thoracoscopy (Right: Chest) Diagnosis:       Pericardial effusion ([facility])      (Pericardial effusion ([facility]) [I31.39])    Surgeons: Beto Giles MD Responsible Provider: Bob Connors MD    Anesthesia Type: general ASA Status: Not recorded            Anesthesia Type: general    Vitals Value Taken Time   /83 05/02/24 1009   Temp 37.0 05/02/24 1009   Pulse 107 05/02/24 1008   Resp 12 05/02/24 1009   SpO2 96 % 05/02/24 1008   Vitals shown include unfiled device data.    Anesthesia Post Evaluation    Patient location during evaluation: ICU  Patient participation: complete - patient participated  Level of consciousness: sleepy but conscious  Pain management: inadequate  Airway patency: patent  Cardiovascular status: acceptable and hemodynamically stable  Respiratory status: acceptable and face mask  Hydration status: acceptable  Postoperative Nausea and Vomiting: none  Comments: Pain treated with 50 mcg fentanyl        There were no known notable events for this encounter.

## 2024-05-02 NOTE — PROGRESS NOTES
"Subjective   Ayesha Nova is a 53 y.o. female.    Chief Complaint:  No chief complaint on file.    HPI    Her cardiac history is otherwise unremarkable.  No history of hypertension.  Past echocardiographic studies have demonstrated normal left ventricular function with normal global longitudinal strain measurements.     Past medical history: Significant for metastatic breast cancer.  This dates back to 2016.  In 2018 she had lung surgery.     Review of Systems   Cardiovascular:  Positive for chest pain, dyspnea on exertion and palpitations.   All other systems reviewed and are negative.    ROS    Visit Vitals  Smoking Status Former        Objective     Physical Exam    Lab Review:   {Recent labs:17159::\"not applicable\"}    Assessment:    1.  "

## 2024-05-02 NOTE — PROCEDURES
A time out was performed.  The right wrist was prepped using chlorhexidine scrub and draped in sterile fashion using a three quarter sheet drape and sterile towels. The pulse was identified and the wrist was positioned in the usual fashion. Anesthesia was achieved using 1% lidocaine. Using the Arrow Radial Arterial Line Kit, a needle was inserted into the radial artery. Arterial blood was seen to pulsate in the flash chamber. The guidewire was advanced easily into the artery. The catheter was then advanced over the wire and the needle and wire were withdrawn. The catheter was sutured in place. A sterile opsite was placed over the catheter at the insertion site. The patient tolerated the procedure without any hemodynamic compromise. At the time of procedure completion, the catheter was connected to the cardiac monitor and calibrated. Appropriate waveform and blood pressure tracing was observed. Patient tolerated the procedure well.  Estimated blood loss is 2 cc.    Consent taken from the patient on the bedside.

## 2024-05-02 NOTE — CONSULTS
"Vancomycin Dosing by Pharmacy- INITIAL    Ayesha Nova is a 53 y.o. year old female who Pharmacy has been consulted for vancomycin dosing for pericarditis. Based on the patient's indication and renal status this patient will be dosed based on a goal AUC of 400-600.     Renal function is currently stable.    Visit Vitals  /71   Pulse (!) 117   Temp 37.9 °C (100.2 °F) (Temporal)   Resp (!) 33        Lab Results   Component Value Date    CREATININE 0.47 (L) 05/01/2024    CREATININE 0.66 03/07/2024    CREATININE 0.82 05/19/2023    CREATININE 0.85 01/27/2023    CREATININE 0.95 11/13/2022    CREATININE 0.71 04/21/2022        Patient weight is No results found for: \"PTWEIGHT\"    No results found for: \"CULTURE\"     No intake/output data recorded.  [unfilled]    No results found for: \"PATIENTTEMP\"       Assessment/Plan     Patient has already been given a loading dose of 1750 mg.  Will initiate vancomycin maintenance,  1500 mg every 12 hours.    This dosing regimen is predicted by [employer] to result in the following pharmacokinetic parameters:  Regimen: 1500 mg IV every 12 hours.  Start time: 12:01 on 05/02/2024  Exposure target: AUC24 (range)400-600 mg/L.hr   AUC24,ss: 559 mg/L.hr  Probability of AUC24 > 400: 81 %  Ctrough,ss: 15.5 mg/L  Probability of Ctrough,ss > 20: 32 %  Probability of nephrotoxicity (Lodise ETELVINA 2009): 11 %    Follow-up level will be ordered on 5/3 at AM labs unless clinically indicated sooner.  Will continue to monitor renal function daily while on vancomycin and order serum creatinine at least every 48 hours if not already ordered.  Follow for continued vancomycin needs, clinical response, and signs/symptoms of toxicity.       Burt Fiore RPh       "

## 2024-05-02 NOTE — NURSING NOTE
Patient experienced suspected Vancomycin allergic reaction. Patient had complaints of itching on the head and arms. No signs of airway closure other than severe wheezing on auscultation.  Pepcid, Decadron, and albuterol treatment were administered. Patient vitals have been stable, patient becoming increasingly wheezy even with treatment. Dr. Giles is coming to do her procedure tonight. Arterial line and Central line are being placed.  Emanuel has been called and notified.

## 2024-05-02 NOTE — CONSULTS
"Reason For Consult  Pericardial effusion    History Of Present Illness  Ayesha Nova is a 53 y.o. female presenting with known pericardial effusion in the setting of metastatic breast cancer.  She called her cardiologist because of tachycardia and shortness of breath and he recommended she come to the emergency department.  She noted worsening shortness of breath over the last week or so and today she was unable to walk in the house from the living room to the bathroom without becoming dyspneic.  She denies chest pain, denies syncope, denies orthopnea.  She does have occasional coughing.    Past Medical History  She has a past medical history of Personal history of malignant neoplasm of breast.    Surgical History  She has a past surgical history that includes Hysterectomy (02/29/2016); Other surgical history (04/20/2022); Other surgical history (04/20/2022); and Other surgical history (04/20/2022).  She has a history of left lung metastectomy in 2016.  She has had multiple breast biopsies and breast reconstruction.     Social History  She reports that she quit smoking about 4 years ago. Her smoking use included cigarettes. She has never used smokeless tobacco. She reports that she does not drink alcohol and does not use drugs.    Family History  No family history on file.     Allergies  Morphine and Sulfa (sulfonamide antibiotics)      Physical Exam  Awake, alert, oriented x 4  Normocephalic, atraumatic, ocular ocular muscles intact  Regular rhythm, tachycardic  Well-healed left posterior thoracotomy.  Warm, well-perfused  Motor score 5/5 all 4 extremities     Last Recorded Vitals  Blood pressure 115/72, pulse (!) 122, temperature 38.8 °C (101.8 °F), temperature source Temporal, resp. rate (!) 27, height 1.6 m (5' 2.99\"), weight 70.2 kg (154 lb 12.2 oz), SpO2 97%.    Relevant Results  Computed tomography of the chest dated 12th December, 2024.  There is multiple nodules in the right as well as left lung fields.  There " is thickening around the right upper lobe bronchus.  There is minimal pericardial effusion noted on this scan.  The aorta is normal caliber, with a normal arch branching pattern.    Transthoracic echocardiogram dated April 15, 2024 shows a left ventricle that is normal in size and function, estimated ejection fraction is 65%.  There is a large pericardial effusion.  There is no significant valvular abnormalities.  There are no wall motion abnormalities.      Assessment/Plan     Mrs. Nova is a 53-year-old woman who has a large pericardial effusion in the setting of stage IV metastatic breast cancer.  She is symptomatic with dyspnea on exertion as well as sinus tachycardia.  We will plan for video-assisted thoracoscopic pericardial window.    She was being treated for possible infectious etiologies based on her CT scan and a low-grade fever when she started noticing itching of the scalp as well as coughing and wheezing.  She was diagnosed with an allergic reaction to her antibiotic therapy and she was treated with steroids, as well as Benadryl.  Before giving intramuscular epinephrine was called to see her emergently to determine if we would need a emergent procedure performed given the risk of epinephrine in the setting of large pericardial effusion and simile who is already tachycardic.  I asked that anesthesia be at the bedside and we would plan for a bedside pericardiocentesis if necessary, with urgent intubation.  I also asked the critical care team to place an radial arterial line for hemodynamic monitoring.    As I got to the bedside she had responded to the albuterol nebulizers as well as the Decadron.  Her blood pressure was normal, with a mean of 90 mmHg.  We elected to hold off and remain with the plan to go to the operating room at 7:30 AM and an more urgent rather than emergent fashion at 3:30 in the morning.    I discussed with her the risks and benefits of pericardial window, including bleeding, injury  to the heart and great vessels, injury to the lung, need for open surgical rescue.  She has agreed, and signed electronic consent form.        Beto Giles MD

## 2024-05-02 NOTE — PROGRESS NOTES
"Subjective    Seen and examined after pericardial window today.  Patient prefers a holistic approach to her medical care.  Thus extensive discussion had with her regarding importance of pain control after the procedure, agreeable to scheduled Tylenol and Dilaudid as needed for moderate to severe pain.   also at bedside for this discussion.  She is anxious regarding moving, walking after procedure today, encouraged her to rest today.     Objective     Physical Exam  General: In pain, lying in bed, tearful  HEENT: Normocephalic, atraumatic, pupils equally reactive to light  Chest: Clear to auscultation bilaterally, right chest tube in place  Heart: Tachycardic  Abdomen: Soft, nontender, nondistended  Extremities: No swelling   Neuro: Alert oriented x 4.  No focal deficits identified.    Last Recorded Vitals  Blood pressure 115/72, pulse 109, temperature 36.5 °C (97.7 °F), temperature source Temporal, resp. rate (!) 30, height 1.6 m (5' 2.99\"), weight 71.7 kg (158 lb 1.1 oz), SpO2 93%.  Intake/Output last 3 Shifts:  I/O last 3 completed shifts:  In: 2315.4 (32.3 mL/kg) [I.V.:1015.4 (14.2 mL/kg); IV Piggyback:1300]  Out: 250 (3.5 mL/kg) [Urine:250 (0.1 mL/kg/hr)]  Weight: 71.7 kg     Assessment/Plan     53-year-old with history of metastatic breast cancer (originally diagnosed in 2016, triple positive) to lung, hypothyroidism presented on 5/1 per advisement of her cardiologist, she was having tachycardia, chest tightness, shortness of breath, worsening fatigue for the last few weeks.    Neurological System:  #Postoperative pain management  -After extensive discussion, patient agreeable to scheduled Tylenol and Dilaudid for moderate to severe pain.    -Monitor for ICU delirium     Cardiovascular System:  #Large pericardial effusion, in setting of metastatic cancer  #Status post pericardial window, 5/2  -Initially presented with tachycardia as high as 130s. Has now resolved. Hemodynamically stable.  -Admitted to " cardiac ICU. Cardiothoracic surgery following. Monitor chest tube output and serial CXR.     Respiratory System:  #Postop respiratory insufficiency  -Extubated without difficulty to room air.  -Incentive spirometer, respiratory hygiene.     Gastrointestinal System:  -Advance diet per CTS  -bowel regimen     Endocrine System:  #Hypothyroidism, history of  -Confirming third dose with pharmacy     Renal System:  -Monitor ins/outs, RFP/mag     Hematological/oncological system:  #Metastatic breast cancer to lung  -Breast cancer originally diagnosed in 2016, triple positive. #Records reviewed. Patient has declined chemotherapy/radiation numerous times in the past. Has been evaluated by oncology at [facility]. Patient prefers holistic approach to her medical care. Unfortunately, diagnosed with metastatic breast cancer in 2018 after resection of a suspicious lung nodule. Had been lost to follow-up until 2021, saw Dr. Carolina, recommending treatment, however pt still declining.     Infection Disease System:  -no active issues     Checklist  Vent/O2: Room air  Lines/Devices: Peripheral IV, arterial line, right chest tube  ATBx: perioperative  Diet: advance per CTS  PPX: lovenox  Code: Full       Areli Gaytan,   Internal Medicine, PGY-II

## 2024-05-02 NOTE — PROGRESS NOTES
Vancomycin Dosing by Pharmacy- Cessation of Therapy    Consult to pharmacy for vancomycin dosing has been discontinued by the prescriber, pharmacy will sign off at this time.    Please call pharmacy if there are further questions or re-enter a consult if vancomycin is resumed.     Burt Fiore RP

## 2024-05-02 NOTE — H&P
[facility] Pulmonary and Critical Care Medicine   History and Physical            HPI:  53-year-old woman with past medical history of metastatic breast cancer with mets to lung who was sent in to ER by her cardiologist.  Patient called cardiology on-call with the symptoms of tachycardia.  As per cardiology recommendation, patient prior TTE with acute diastolic to collapse.  As per ER physician, patient had some chest tightness and was tachycardic.  Bedside ultrasound also done by Dr. Melissa in the ER with diastolic collapse with concern for cardiac tamponade. ED team spoke with cardiology and CT surgery team.  As the patient is hemodynamically stable at this time, cardiology facility recommended fluids, hemodynamic monitoring in the ICU, plan for pericardial window tomorrow morning as long as patient remains hemodynamically stable overnight.  Labs CBC, CMP, lactic acid, magnesium, lactate, BNP, troponin, coagulation panel within normal limits.      Patient seen in the ER.  Tachycardic to high 110s.  Blood pressure stable.  Patient with chest tightness.  Taking a deep breath causes chest discomfort.  No chest pain if patient does not take a deep breath.  She denied having any headache, dizziness, shortness of breath, nausea, vomiting, any belly pain, changes in bowel or bladder habits, any pain or swelling in the legs.  Patient stated that she has been having fatigue for weeks which was getting worse.    Past Medical History:  Past Medical History:   Diagnosis Date    Personal history of malignant neoplasm of breast     History of malignant neoplasm of breast       Past Surgical History:  Past Surgical History:   Procedure Laterality Date    HYSTERECTOMY  02/29/2016    Hysterectomy    OTHER SURGICAL HISTORY  04/20/2022    Breast reduction    OTHER SURGICAL HISTORY  04/20/2022    Lumpectomy    OTHER SURGICAL HISTORY  04/20/2022    Lung wedge resection        Family History:  No family history on file.      Social History:   reports that she quit smoking about 4 years ago. Her smoking use included cigarettes. She has never used smokeless tobacco. She reports that she does not drink alcohol and does not use drugs.        Review of systems:   Review of Systems   A 10+ point ROS was completed and otherwise negative except as noted above and per HPI.      Objective   Vitals:  Most Recent:  Vitals:    05/01/24 2059   BP:    Pulse:    Resp:    Temp: (!) 38.3 °C (100.9 °F)   SpO2:        24hr Min/Max:  Temp  Min: 37.5 °C (99.5 °F)  Max: 38.3 °C (100.9 °F)  Pulse  Min: 116  Max: 124  BP  Min: 120/74  Max: 134/84  Resp  Min: 18  Max: 18  SpO2  Min: 95 %  Max: 97 %      Physical exam:    Physical Exam     General: Comfortable lying in bed.  HEENT: Normocephalic, atraumatic, pupils equally reactive to light  Chest: Clear to auscultation bilaterally  Heart: Tachycardic  Abdomen: Soft, nontender, nondistended  Extremities: No swelling or cyanosis  Neuro: Alert oriented x 4.  No focal deficits identified.    Lab/Radiology/Diagnostic Review:  Results for orders placed or performed during the hospital encounter of 05/01/24 (from the past 24 hour(s))   B-type natriuretic peptide   Result Value Ref Range    BNP 21 0 - 99 pg/mL   Lactate   Result Value Ref Range    Lactate 1.2 0.4 - 2.0 mmol/L   CBC and Auto Differential   Result Value Ref Range    WBC 8.1 4.4 - 11.3 x10*3/uL    nRBC 0.0 0.0 - 0.0 /100 WBCs    RBC 4.25 4.00 - 5.20 x10*6/uL    Hemoglobin 12.5 12.0 - 16.0 g/dL    Hematocrit 37.4 36.0 - 46.0 %    MCV 88 80 - 100 fL    MCH 29.4 26.0 - 34.0 pg    MCHC 33.4 32.0 - 36.0 g/dL    RDW 13.9 11.5 - 14.5 %    Platelets 294 150 - 450 x10*3/uL    Neutrophils % 73.0 40.0 - 80.0 %    Immature Granulocytes %, Automated 0.1 0.0 - 0.9 %    Lymphocytes % 18.8 13.0 - 44.0 %    Monocytes % 7.2 2.0 - 10.0 %    Eosinophils % 0.4 0.0 - 6.0 %    Basophils % 0.5 0.0 - 2.0 %    Neutrophils Absolute 5.92 1.20 - 7.70 x10*3/uL    Immature  Granulocytes Absolute, Automated 0.01 0.00 - 0.70 x10*3/uL    Lymphocytes Absolute 1.52 1.20 - 4.80 x10*3/uL    Monocytes Absolute 0.58 0.10 - 1.00 x10*3/uL    Eosinophils Absolute 0.03 0.00 - 0.70 x10*3/uL    Basophils Absolute 0.04 0.00 - 0.10 x10*3/uL   Comprehensive metabolic panel   Result Value Ref Range    Glucose 99 74 - 99 mg/dL    Sodium 138 136 - 145 mmol/L    Potassium 4.4 3.5 - 5.3 mmol/L    Chloride 104 98 - 107 mmol/L    Bicarbonate 25 21 - 32 mmol/L    Anion Gap 13 10 - 20 mmol/L    Urea Nitrogen 8 6 - 23 mg/dL    Creatinine 0.47 (L) 0.50 - 1.05 mg/dL    eGFR >90 >60 mL/min/1.73m*2    Calcium 9.2 8.6 - 10.3 mg/dL    Albumin 4.0 3.4 - 5.0 g/dL    Alkaline Phosphatase 35 33 - 110 U/L    Total Protein 7.2 6.4 - 8.2 g/dL    AST 17 9 - 39 U/L    Bilirubin, Total 0.5 0.0 - 1.2 mg/dL    ALT 12 7 - 45 U/L   Magnesium   Result Value Ref Range    Magnesium 2.14 1.60 - 2.40 mg/dL   Troponin I, High Sensitivity   Result Value Ref Range    Troponin I, High Sensitivity 4 0 - 13 ng/L   Protime-INR   Result Value Ref Range    Protime 12.8 9.8 - 12.8 seconds    INR 1.1 0.9 - 1.1   aPTT   Result Value Ref Range    aPTT 35 27 - 38 seconds   Type and Screen   Result Value Ref Range    ABO TYPE A     Rh TYPE NEG     ANTIBODY SCREEN NEG        All other labs and Imaging have been personally reviewed.         Assessment/Plan       Pericardial effusion with concern for pericardial tamponade: Patient alert oriented x 4.  Extremities warm. Lactic acid, LFTs and renal functions.  No concern for hypoperfusion at this time    Fluid resuscitation  Hemodynamic monitoring. Patient refused to have A line placement.   Plan for pericardial window by CT surgery in the morning.  Will reach out to cardiology and CT surgery if patient becomes unstable overnight.  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 As per patient, she was having low grade fever at home too.     Send  blood cultures and UA  Will start broad-spectrum antibiotics including vancomycin and cefepime for concerns for purulent pericarditis.     History of metastatic breast cancer with mets to lungs:    Patient is not on any chemotherapy, immunotherapy or hormonal therapy as per patient    ICU CHECK LIST:   Antimicrobials: Vanco and cefepime  Oxygen: Room air  Feeding: N.p.o.  Fluids: LR  Analgesia: None  Sedation: None  Thromboprophylaxis: SCDs. Hold DVT prophylaxis as patient to get pericardial window tomorrow   Ulcer prophylaxis: PPI   Glycemic control: BGM   Bowel care: PRN   Indwelling catheters: [place]   Lines: PIVs   Dispo: MICU   Code Status: Full code     Case discussed with Dr. Giles over the phone. Plan discussed with the patient and the .      I have personally spent 70 minutes of critical care time, exclusive of time spent on any procedures, in evaluation and management of this critically ill patient’s condition mentioned above in the assessment and plan.     Mike Diana MD  Critical Care Attending       Please excuse any typographical or unwanted errors within this documentation as voice recognition software was used to dictate this note.

## 2024-05-02 NOTE — PROGRESS NOTES
05/02/24 1119   Discharge Planning   Living Arrangements Spouse/significant other;Parent;Children   Support Systems Spouse/significant other;Children   Assistance Needed Independent and driving PTA   Type of Residence Private residence   Number of Stairs to Enter Residence 1   Number of Stairs Within Residence   (doesn't go to basement)   Do you have animals or pets at home? Yes   Type of Animals or Pets 2 dogs   Home or Post Acute Services None   Patient expects to be discharged to: Home   Does the patient need discharge transport arranged? No   Financial Resource Strain   How hard is it for you to pay for the very basics like food, housing, medical care, and heating? Not hard   Housing Stability   In the last 12 months, was there a time when you were not able to pay the mortgage or rent on time? N   In the last 12 months, how many places have you lived? 1   In the last 12 months, was there a time when you did not have a steady place to sleep or slept in a shelter (including now)? N   Transportation Needs   In the past 12 months, has lack of transportation kept you from medical appointments or from getting medications? no   In the past 12 months, has lack of transportation kept you from meetings, work, or from getting things needed for daily living? No     This TCC met with patient and spouse at bedside, introduced self and explained role.  Demographic information and insurance verified.  Patient is from home with spouse, children, and patient's mother.  Independent and driving PTA.  Denies SW needs at this time.  Patient plans to return home at discharge, no needs.  Patient's spouse will provide transportation home.  Care Transitions will continue to follow.

## 2024-05-02 NOTE — SIGNIFICANT EVENT
The patient's symptoms of wheezing, head itching and cough improved after Decadron, albuterol, and famotidine.  Patient refused to have a Benadryl.  No rash on her body. No swelling of the lips, tongue or oropharynx. She remains on RA. She is not feeling short of breath. Dr. Giles and anesthesiology on the bedside.  As patient has improved and is hemodynamically stable, Dr. Giles planning to do pericardiocentesis early in the morning. Will continue Decadron, albuterol, and famotidine.  Vancomycin and cefepime discontinued. Will hold off on IM epinephrine as the patient's symptoms have improved and to avoid worsening of tachycardia in the setting of pericardial effusion that can worsen hemodynamics. Plan discussed with Dr. Giles.     Patient's  also on the bedside and updated with the events and the plan.

## 2024-05-02 NOTE — ANESTHESIA PROCEDURE NOTES
Airway  Date/Time: 5/2/2024 7:58 AM  Urgency: elective    Airway not difficult    Staffing  Performed: CRNA   Authorized by: Bob Connors MD    Performed by: LEANDER Berry-LORIE  Patient location during procedure: OR    Indications and Patient Condition  Indications for airway management: anesthesia and airway protection  Spontaneous ventilation: present  Sedation level: deep  Preoxygenated: yes  Patient position: sniffing  MILS maintained throughout  Mask difficulty assessment: 1 - vent by mask  Planned trial extubation    Final Airway Details  Final airway type: endotracheal airway      Successful airway: ETT - double lumen left  Cuffed: yes   Successful intubation technique: video laryngoscopy  Facilitating devices/methods: anterior pressure/BURP and intubating stylet  Endotracheal tube insertion site: oral  Blade: Sandrine  Blade size: #3  ETT DL size (fr): 37  Cormack-Lehane Classification: grade I - full view of glottis  Placement verified by: bronchoscopy and capnometry   Measured from: lips  ETT to lips (cm): 28  Number of attempts at approach: 1  Ventilation between attempts: none  Number of other approaches attempted: 0

## 2024-05-02 NOTE — CONSULTS
Cardiology Consultation- New Consult    Inpatient consult to Cardiology  Consult performed by: Chas Jorgensen MD  Consult ordered by: Jason Ellison, LEANDER-CNP        HPI: Ayesha Nova is a 53 y.o.  female who presented with tachycardia and dyspnea in setting of pericardial effusion. Past medical history of metastatic breast cancer with pulmonary metastasis, pericardial effusion.      Ayesha contacted cardiology on-call (myself) on evening of 5/1/2024 with complaints of new tachycardia, dyspnea, and intermittent chest tightness. Recommended seeking immediate medical attention given suspicion for cardiac tamponade. Patient had known moderate pericardial effusion with evidence of early tamponade physiology on prior TTE (early right atrial diastolic collapse).  Given history of metastatic breast cancer recommended cardiothoracic surgery consultation for consideration of pericardial window.     In the ED, ECG showed sinus tachycardia.  Patient noted dry cough, dyspnea.  Cardiothoracic surgery was consulted and recommended pericardial window  Patient underwent video-assisted thoracoscopic pericardial window on 5/2/2024 with Dr. Giles.  Cultures and cytology sent.      Patient was seen at bedside in CICU by myself on the cardiology consult service on 5/2/2024. Patient notes post-op pains and nausea. Chest tube in place, draining serosanguinous fluid. Remains hemodynamically stable.        Past Medical History:   She has a past medical history of Personal history of malignant neoplasm of breast.    Surgical History:   She has a past surgical history that includes Hysterectomy (02/29/2016); Other surgical history (04/20/2022); Other surgical history (04/20/2022); and Other surgical history (04/20/2022).    Family History:   No family history on file.    Allergies:  Morphine and Sulfa (sulfonamide antibiotics)     Social History:   - Former smoker (quit 4 years ago); no alcohol; no illicit drugs  - Likes to  [place]    Prior Cardiovascular Testing (Personally Reviewed):         Review of Systems:  Review of Systems   Constitutional: Positive for malaise/fatigue.   HENT: Negative.     Eyes: Negative.    Cardiovascular:  Positive for chest pain and dyspnea on exertion. Negative for near-syncope, orthopnea, palpitations, paroxysmal nocturnal dyspnea and syncope.   Respiratory: Negative.     Endocrine: Negative.    Hematologic/Lymphatic: Negative.    Skin: Negative.    Musculoskeletal: Negative.    Gastrointestinal: Negative.    Genitourinary: Negative.    Neurological: Negative.    Psychiatric/Behavioral: Negative.     Allergic/Immunologic: Negative.        Objective     Outpatient Medications:    Current Facility-Administered Medications:     acetaminophen (Tylenol) tablet 650 mg, 650 mg, oral, q6h, Jason R Patchen, APRN-CNP, 650 mg at 05/02/24 1403    albuterol 2.5 mg /3 mL (0.083 %) nebulizer solution 2.5 mg, 2.5 mg, nebulization, q2h PRN, Jason R Patchen, APRN-CNP    ceFAZolin in dextrose (iso-os) (Ancef) IVPB 2 g, 2 g, intravenous, q8h, Jason R Patchen, APRN-CNP    dexAMETHasone (Decadron) injection 8 mg, 8 mg, intravenous, q8h, Jason R Patchen, APRN-CNP    dextrose 50 % injection 12.5 g, 12.5 g, intravenous, q15 min PRN, Jason R Patchen, APRN-CNP    dextrose 50 % injection 25 g, 25 g, intravenous, q15 min PRN, Jason R Patchen, APRN-CNP    [START ON 5/3/2024] enoxaparin (Lovenox) syringe 40 mg, 40 mg, subcutaneous, Daily, Jason R Patchen, APRN-CNP    famotidine PF (Pepcid) injection 20 mg, 20 mg, intravenous, q12h CHRIS, Jason R Patchen, APRN-CNP, 20 mg at 05/02/24 0140    glucagon (Glucagen) injection 1 mg, 1 mg, intramuscular, q15 min PRN, Jason R Patchen, APRN-CNP    glucagon (Glucagen) injection 1 mg, 1 mg, intramuscular, q15 min PRN, Jason R Patchen, APRN-CNP    HYDROmorphone (Dilaudid) injection 0.4 mg, 0.4 mg, intravenous, q6h PRN, Areli Gaytan DO    HYDROmorphone PF (Dilaudid) injection 0.2 mg, 0.2 mg, intravenous, q4h PRN,  "Areli Gaytan DO    ipratropium-albuteroL (Duo-Neb) 0.5-2.5 mg/3 mL nebulizer solution 3 mL, 3 mL, nebulization, q6h, Jason R Patchen, APRN-CNP, 3 mL at 05/02/24 0156    lactated Ringer's infusion, 5 mL/hr, intravenous, Continuous, Jason R Patchen, APRN-CNP, Last Rate: 5 mL/hr at 05/02/24 1113, 5 mL/hr at 05/02/24 1113    lidocaine 4 % patch 1 patch, 1 patch, transdermal, q24h, Jason R Patchen, APRN-CNP, 1 patch at 05/02/24 1112    naloxone (Narcan) injection 0.2 mg, 0.2 mg, intravenous, q5 min PRN, Jason R Patchen, APRN-CNP    ondansetron (Zofran) injection 4 mg, 4 mg, intravenous, q8h PRN, Areli Gaytan DO, 4 mg at 05/02/24 1418    sennosides-docusate sodium (Haylee-Colace) 8.6-50 mg per tablet 2 tablet, 2 tablet, oral, BID, Jason R Patchen, APRN-CNP     Inpatient Medications:  Scheduled medications   Medication Dose Route Frequency    acetaminophen  650 mg oral q6h    ceFAZolin  2 g intravenous q8h    dexAMETHasone  8 mg intravenous q8h    [START ON 5/3/2024] enoxaparin  40 mg subcutaneous Daily    famotidine  20 mg intravenous q12h CHRIS    ipratropium-albuteroL  3 mL nebulization q6h    lidocaine  1 patch transdermal q24h    sennosides-docusate sodium  2 tablet oral BID       PRN medications   Medication    albuterol    dextrose    dextrose    glucagon    glucagon    HYDROmorphone    HYDROmorphone    naloxone    ondansetron       Continuous Medications   Medication Dose Last Rate    lactated Ringer's  5 mL/hr 5 mL/hr (05/02/24 1113)       Last Recorded Vitals  /72   Pulse 109   Temp 36.5 °C (97.7 °F) (Temporal)   Resp (!) 30   Ht 1.6 m (5' 2.99\")   Wt 71.7 kg (158 lb 1.1 oz)   SpO2 93%   BMI 28.01 kg/m²     Physical Exam:    Physical Exam  Constitutional:       General: She is not in acute distress.  HENT:      Head: Normocephalic.      Mouth/Throat:      Mouth: Mucous membranes are moist.   Eyes:      Extraocular Movements: Extraocular movements intact.      Conjunctiva/sclera: Conjunctivae normal.   Neck: "      Vascular: No carotid bruit or JVD.   Cardiovascular:      Rate and Rhythm: Regular rhythm. Tachycardia present.      Pulses: Normal pulses.      Heart sounds: No murmur heard.     Comments: + chest tube in place draining serosanguinous fluid   Pulmonary:      Effort: Pulmonary effort is normal. No respiratory distress.      Breath sounds: Normal breath sounds.   Abdominal:      General: Bowel sounds are normal. There is no distension.      Palpations: Abdomen is soft.   Musculoskeletal:         General: No swelling.   Skin:     General: Skin is warm and dry.   Neurological:      General: No focal deficit present.      Mental Status: She is alert.      Cranial Nerves: No cranial nerve deficit.      Motor: No weakness.   Psychiatric:         Mood and Affect: Mood normal.         Behavior: Behavior normal.         Intake / Output Summary:     Intake/Output Summary (Last 24 hours) at 5/2/2024 1422  Last data filed at 5/2/2024 1008  Gross per 24 hour   Intake 3815.42 ml   Output 1410 ml   Net 2405.42 ml       Net IO Since Admission: 2,405.42 mL [05/02/24 1422]    Lab/Radiology/Diagnostic Review:    Labs  Results for orders placed or performed during the hospital encounter of 05/01/24 (from the past 24 hour(s))   B-type natriuretic peptide   Result Value Ref Range    BNP 21 0 - 99 pg/mL   Lactate   Result Value Ref Range    Lactate 1.2 0.4 - 2.0 mmol/L   CBC and Auto Differential   Result Value Ref Range    WBC 8.1 4.4 - 11.3 x10*3/uL    nRBC 0.0 0.0 - 0.0 /100 WBCs    RBC 4.25 4.00 - 5.20 x10*6/uL    Hemoglobin 12.5 12.0 - 16.0 g/dL    Hematocrit 37.4 36.0 - 46.0 %    MCV 88 80 - 100 fL    MCH 29.4 26.0 - 34.0 pg    MCHC 33.4 32.0 - 36.0 g/dL    RDW 13.9 11.5 - 14.5 %    Platelets 294 150 - 450 x10*3/uL    Neutrophils % 73.0 40.0 - 80.0 %    Immature Granulocytes %, Automated 0.1 0.0 - 0.9 %    Lymphocytes % 18.8 13.0 - 44.0 %    Monocytes % 7.2 2.0 - 10.0 %    Eosinophils % 0.4 0.0 - 6.0 %    Basophils % 0.5 0.0 -  2.0 %    Neutrophils Absolute 5.92 1.20 - 7.70 x10*3/uL    Immature Granulocytes Absolute, Automated 0.01 0.00 - 0.70 x10*3/uL    Lymphocytes Absolute 1.52 1.20 - 4.80 x10*3/uL    Monocytes Absolute 0.58 0.10 - 1.00 x10*3/uL    Eosinophils Absolute 0.03 0.00 - 0.70 x10*3/uL    Basophils Absolute 0.04 0.00 - 0.10 x10*3/uL   Comprehensive metabolic panel   Result Value Ref Range    Glucose 99 74 - 99 mg/dL    Sodium 138 136 - 145 mmol/L    Potassium 4.4 3.5 - 5.3 mmol/L    Chloride 104 98 - 107 mmol/L    Bicarbonate 25 21 - 32 mmol/L    Anion Gap 13 10 - 20 mmol/L    Urea Nitrogen 8 6 - 23 mg/dL    Creatinine 0.47 (L) 0.50 - 1.05 mg/dL    eGFR >90 >60 mL/min/1.73m*2    Calcium 9.2 8.6 - 10.3 mg/dL    Albumin 4.0 3.4 - 5.0 g/dL    Alkaline Phosphatase 35 33 - 110 U/L    Total Protein 7.2 6.4 - 8.2 g/dL    AST 17 9 - 39 U/L    Bilirubin, Total 0.5 0.0 - 1.2 mg/dL    ALT 12 7 - 45 U/L   Magnesium   Result Value Ref Range    Magnesium 2.14 1.60 - 2.40 mg/dL   Troponin I, High Sensitivity   Result Value Ref Range    Troponin I, High Sensitivity 4 0 - 13 ng/L   Protime-INR   Result Value Ref Range    Protime 12.8 9.8 - 12.8 seconds    INR 1.1 0.9 - 1.1   aPTT   Result Value Ref Range    aPTT 35 27 - 38 seconds   Type and Screen   Result Value Ref Range    ABO TYPE A     Rh TYPE NEG     ANTIBODY SCREEN NEG    ECG 12 lead   Result Value Ref Range    Systolic blood pressure 118 mmHg    Diastolic blood pressure 73 mmHg    Ventricular Rate 114 BPM    Atrial Rate 114 BPM    SC Interval 130 ms    QRS Duration 60 ms    QT Interval 314 ms    QTC Calculation(Bazett) 432 ms    P Axis 62 degrees    R Axis 36 degrees    T Axis 74 degrees    QRS Count 19 beats    Q Onset 222 ms    P Onset 157 ms    P Offset 203 ms    T Offset 379 ms    QTC Fredericia 388 ms   Electrocardiogram, 12-lead PRN ACS symptoms   Result Value Ref Range    Ventricular Rate 125 BPM    Atrial Rate 125 BPM    SC Interval 126 ms    QRS Duration 60 ms    QT Interval  310 ms    QTC Calculation(Bazett) 447 ms    P Axis 56 degrees    R Axis 19 degrees    T Axis 77 degrees    QRS Count 21 beats    Q Onset 221 ms    P Onset 158 ms    P Offset 203 ms    T Offset 376 ms    QTC Fredericia 395 ms   Blood Culture    Specimen: Peripheral Venipuncture; Blood culture   Result Value Ref Range    Blood Culture Loaded on Instrument - Culture in progress    Blood Culture    Specimen: Peripheral Venipuncture; Blood culture   Result Value Ref Range    Blood Culture Loaded on Instrument - Culture in progress    POCT GLUCOSE   Result Value Ref Range    POCT Glucose 119 (H) 74 - 99 mg/dL   Urinalysis with Reflex Microscopic   Result Value Ref Range    Color, Urine Yellow Straw, Yellow    Appearance, Urine Clear Clear    Specific Gravity, Urine 1.015 1.005 - 1.035    pH, Urine 6.0 5.0, 5.5, 6.0, 6.5, 7.0, 7.5, 8.0    Protein, Urine NEGATIVE NEGATIVE mg/dL    Glucose, Urine NEGATIVE NEGATIVE mg/dL    Blood, Urine NEGATIVE NEGATIVE    Ketones, Urine 20 (1+) (A) NEGATIVE mg/dL    Bilirubin, Urine NEGATIVE NEGATIVE    Urobilinogen, Urine <2.0 <2.0 mg/dL    Nitrite, Urine NEGATIVE NEGATIVE    Leukocyte Esterase, Urine NEGATIVE NEGATIVE   Blood Gas Arterial   Result Value Ref Range    POCT pH, Arterial 7.47 (H) 7.38 - 7.42 pH    POCT pCO2, Arterial 27 (L) 38 - 42 mm Hg    POCT pO2, Arterial 83 (L) 85 - 95 mm Hg    POCT SO2, Arterial 99 94 - 100 %    POCT Oxy Hemoglobin, Arterial 96.1 94.0 - 98.0 %    POCT Base Excess, Arterial -2.9 (L) -2.0 - 3.0 mmol/L    POCT HCO3 Calculated, Arterial 19.7 (L) 22.0 - 26.0 mmol/L    Patient Temperature      FiO2 21 %    Apparatus      Site of Arterial Puncture Arterial Line    Blood Gas Arterial Full Panel   Result Value Ref Range    POCT pH, Arterial 7.47 (H) 7.38 - 7.42 pH    POCT pCO2, Arterial 27 (L) 38 - 42 mm Hg    POCT pO2, Arterial 83 (L) 85 - 95 mm Hg    POCT SO2, Arterial 99 94 - 100 %    POCT Oxy Hemoglobin, Arterial 96.1 94.0 - 98.0 %    POCT Hematocrit  Calculated, Arterial 33.0 (L) 36.0 - 46.0 %    POCT Sodium, Arterial 136 136 - 145 mmol/L    POCT Potassium, Arterial 3.4 (L) 3.5 - 5.3 mmol/L    POCT Chloride, Arterial 106 98 - 107 mmol/L    POCT Ionized Calcium, Arterial 1.16 1.10 - 1.33 mmol/L    POCT Glucose, Arterial 121 (H) 74 - 99 mg/dL    POCT Lactate, Arterial 1.3 0.4 - 2.0 mmol/L    POCT Base Excess, Arterial -2.9 (L) -2.0 - 3.0 mmol/L    POCT HCO3 Calculated, Arterial 19.7 (L) 22.0 - 26.0 mmol/L    POCT Hemoglobin, Arterial 10.9 (L) 12.0 - 16.0 g/dL    POCT Anion Gap, Arterial 14 10 - 25 mmo/L    Patient Temperature 37.0 degrees Celsius    FiO2 21 %    Apparatus      Site of Arterial Puncture Arterial Line    CBC   Result Value Ref Range    WBC 7.4 4.4 - 11.3 x10*3/uL    nRBC 0.0 0.0 - 0.0 /100 WBCs    RBC 3.74 (L) 4.00 - 5.20 x10*6/uL    Hemoglobin 10.9 (L) 12.0 - 16.0 g/dL    Hematocrit 33.4 (L) 36.0 - 46.0 %    MCV 89 80 - 100 fL    MCH 29.1 26.0 - 34.0 pg    MCHC 32.6 32.0 - 36.0 g/dL    RDW 14.0 11.5 - 14.5 %    Platelets 244 150 - 450 x10*3/uL   Comprehensive Metabolic Panel   Result Value Ref Range    Glucose 134 (H) 74 - 99 mg/dL    Sodium 138 136 - 145 mmol/L    Potassium 3.7 3.5 - 5.3 mmol/L    Chloride 108 (H) 98 - 107 mmol/L    Bicarbonate 21 21 - 32 mmol/L    Anion Gap 13 10 - 20 mmol/L    Urea Nitrogen 5 (L) 6 - 23 mg/dL    Creatinine 0.29 (L) 0.50 - 1.05 mg/dL    eGFR >90 >60 mL/min/1.73m*2    Calcium 8.9 8.6 - 10.3 mg/dL    Albumin 3.8 3.4 - 5.0 g/dL    Alkaline Phosphatase 39 33 - 110 U/L    Total Protein 6.6 6.4 - 8.2 g/dL    AST 65 (H) 9 - 39 U/L    Bilirubin, Total 0.8 0.0 - 1.2 mg/dL    ALT 40 7 - 45 U/L   Lactate   Result Value Ref Range    Lactate 1.1 0.4 - 2.0 mmol/L   Prepare RBC: 4 Units   Result Value Ref Range    PRODUCT CODE C4309V64     Unit Number [id number]     Unit ABO A     Unit RH NEG     XM INTEP COMP     Dispense Status XM     Blood Expiration Date May 20, 2024 23:59 EDT     PRODUCT BLOOD TYPE 0600     UNIT  VOLUME 350     PRODUCT CODE X2982E44     Unit Number [id number]     Unit ABO A     Unit RH NEG     XM INTEP COMP     Dispense Status XM     Blood Expiration Date May 28, 2024 23:59 EDT     PRODUCT BLOOD TYPE 0600     UNIT VOLUME 284     PRODUCT CODE I9099L85     Unit Number [id number]     Unit ABO A     Unit RH NEG     XM INTEP COMP     Dispense Status XM     Blood Expiration Date May 28, 2024 23:59 EDT     PRODUCT BLOOD TYPE 0600     UNIT VOLUME 281     PRODUCT CODE S3137M32     Unit Number [id number]     Unit ABO O     Unit RH NEG     XM INTEP COMP     Dispense Status XM     Blood Expiration Date June 06, 2024 23:59 EDT     PRODUCT BLOOD TYPE 9500     UNIT VOLUME 350    VERIFY ABO/Rh Group Test   Result Value Ref Range    ABO TYPE A     Rh TYPE NEG    Fibrinogen   Result Value Ref Range    Fibrinogen 410 (H) 200 - 400 mg/dL   Magnesium   Result Value Ref Range    Magnesium 1.80 1.60 - 2.40 mg/dL   Platelet Count   Result Value Ref Range    Platelets 227 150 - 450 x10*3/uL   Protime-INR   Result Value Ref Range    Protime 14.7 (H) 9.8 - 12.8 seconds    INR 1.3 (H) 0.9 - 1.1   aPTT   Result Value Ref Range    aPTT 31 27 - 38 seconds   POCT GLUCOSE   Result Value Ref Range    POCT Glucose 189 (H) 74 - 99 mg/dL   BLOOD GAS ARTERIAL FULL PANEL   Result Value Ref Range    POCT pH, Arterial 7.31 (L) 7.38 - 7.42 pH    POCT pCO2, Arterial 37 (L) 38 - 42 mm Hg    POCT pO2, Arterial 78 (L) 85 - 95 mm Hg    POCT SO2, Arterial 97 94 - 100 %    POCT Oxy Hemoglobin, Arterial 94.1 94.0 - 98.0 %    POCT Hematocrit Calculated, Arterial 36.0 36.0 - 46.0 %    POCT Sodium, Arterial 137 136 - 145 mmol/L    POCT Potassium, Arterial 3.7 3.5 - 5.3 mmol/L    POCT Chloride, Arterial 106 98 - 107 mmol/L    POCT Ionized Calcium, Arterial 1.15 1.10 - 1.33 mmol/L    POCT Glucose, Arterial 219 (H) 74 - 99 mg/dL    POCT Lactate, Arterial 1.5 0.4 - 2.0 mmol/L    POCT Base Excess, Arterial -7.0 (L) -2.0 - 3.0 mmol/L    POCT HCO3  Calculated, Arterial 18.6 (L) 22.0 - 26.0 mmol/L    POCT Hemoglobin, Arterial 12.1 12.0 - 16.0 g/dL    POCT Anion Gap, Arterial 16 10 - 25 mmo/L    Patient Temperature 37.0 degrees Celsius    FiO2 40 %    Site of Arterial Puncture Arterial Line    Calcium, Ionized   Result Value Ref Range    POCT Calcium, Ionized 1.17 1.1 - 1.33 mmol/L   Magnesium   Result Value Ref Range    Magnesium 1.74 1.60 - 2.40 mg/dL   Coagulation Screen   Result Value Ref Range    Protime 14.4 (H) 9.8 - 12.8 seconds    INR 1.3 (H) 0.9 - 1.1    aPTT 29 27 - 38 seconds   Fibrinogen   Result Value Ref Range    Fibrinogen 485 (H) 200 - 400 mg/dL   CBC   Result Value Ref Range    WBC 6.5 4.4 - 11.3 x10*3/uL    nRBC 0.0 0.0 - 0.0 /100 WBCs    RBC 4.04 4.00 - 5.20 x10*6/uL    Hemoglobin 11.9 (L) 12.0 - 16.0 g/dL    Hematocrit 36.0 36.0 - 46.0 %    MCV 89 80 - 100 fL    MCH 29.5 26.0 - 34.0 pg    MCHC 33.1 32.0 - 36.0 g/dL    RDW 13.7 11.5 - 14.5 %    Platelets 237 150 - 450 x10*3/uL   Renal Function Panel   Result Value Ref Range    Glucose 214 (H) 74 - 99 mg/dL    Sodium 138 136 - 145 mmol/L    Potassium 3.7 3.5 - 5.3 mmol/L    Chloride 107 98 - 107 mmol/L    Bicarbonate 20 (L) 21 - 32 mmol/L    Anion Gap 15 10 - 20 mmol/L    Urea Nitrogen 6 6 - 23 mg/dL    Creatinine 0.60 0.50 - 1.05 mg/dL    eGFR >90 >60 mL/min/1.73m*2    Calcium 8.3 (L) 8.6 - 10.3 mg/dL    Phosphorus 3.5 2.5 - 4.9 mg/dL    Albumin 3.7 3.4 - 5.0 g/dL   POCT GLUCOSE   Result Value Ref Range    POCT Glucose 180 (H) 74 - 99 mg/dL       Peripheral IV 05/01/24 18 G Left Antecubital (Active)   Placement Date/Time: 05/01/24 1927   Size (Gauge): 18 G  Orientation: Left  Location: Antecubital  Site Prep: Chlorhexidine    Number of days: 0       Peripheral IV 05/01/24 18 G Right Antecubital (Active)   Placement Date/Time: 05/01/24 1928   Size (Gauge): 18 G  Orientation: Right  Location: Antecubital  Site Prep: Chlorhexidine    Number of days: 0       Arterial Line 05/02/24 Right Radial  (Active)   Placement Date/Time: 05/02/24 0230   Hand Hygiene Completed: Yes  Orientation: Right  Location: Radial  Site Prep: Chlorhexidine   Local Anesthetic: Injectable  Placed by: Dr. Diana  Securement Method: Sutured   Number of days: 0       Chest Tube 1 Right Pleural 24 Fr (Active)   Placement Date/Time: 05/02/24 0943   Placed by: DR. PARKS  Hand Hygiene Completed: Yes  Tube Number: 1  Chest Tube Orientation: Right  Chest Tube Location: Pleural  Chest Tube Drain Tube Size (Fr): 24 Fr  Chest Tube Drainage System: Dry seal chest d...   Number of days: 0        Troponin I, High Sensitivity   Date/Time Value Ref Range Status   05/01/2024 07:24 PM 4 0 - 13 ng/L Final   03/07/2024 01:03 PM <3 0 - 13 ng/L Final     Troponin I   Date/Time Value Ref Range Status   11/13/2022 06:34 AM <3 0 - 13 ng/L Final     Comment:     .  Less than 99th percentile of normal range cutoff-  Female and children under 18 years old <14 ng/L; Male <21 ng/L: Negative  Repeat testing should be performed if clinically indicated.   .  Female and children under 18 years old 14-50 ng/L; Male 21-50 ng/L:  Consistent with possible cardiac damage and possible increased clinical   risk. Serial measurements may help to assess extent of myocardial damage.   .  >50 ng/L: Consistent with cardiac damage, increased clinical risk and  myocardial infarction. Serial measurements may help assess extent of   myocardial damage.   .   NOTE: Children less than 1 year old may have higher baseline troponin   levels and results should be interpreted in conjunction with the overall   clinical context.   .  NOTE: Troponin I testing is performed using a different   testing methodology at [facility] than at other   [facility]. Direct result comparisons should only   be made within the same method.       BNP   Date/Time Value Ref Range Status   05/01/2024 07:24 PM 21 0 - 99 pg/mL Final   03/07/2024 01:03 PM 35 0 - 99 pg/mL Final     VLDL    Date/Time Value Ref Range Status   08/23/2019 08:50 AM 13 0 - 40 mg/dL Final       Assessment:   53 y.o.  female who presented with tachycardia and dyspnea in setting of pericardial effusion. Past medical history of metastatic breast cancer with pulmonary metastasis, pericardial effusion.      Given symptomatic pericardial effusion with possible tamponade patient underwent video-assisted thoracoscopic pericardial window with Dr. Giles. Cytology and cultures pending. Suspect malignant effusion given history of metastatic breast cancer.        Overall Recommendations:  1.  Pericardial effusion  - Suspect malignant pericardial effusion  - Underwent video-assisted thorascopic pericardial window given symptomatic effusion; possible tamponade  - Await cytology and fluid analysis  - If patient continues to have sharp pleuritic chest pain with then consider trial of colchicine    2.  Sinus tachycardia  - In the setting of pain; possible component of pericarditis; postoperative pain  - Treat underlying exacerbating factors    3.  History of metastatic breast cancer  - Management as per primary team and oncology    Thank you for the cardiology consult. We will follow with you.     Chas Jorgensen MD

## 2024-05-02 NOTE — ANESTHESIA PREPROCEDURE EVALUATION
Patient: Ayesha Nova    Procedure Information       Anesthesia Start Date/Time: 05/02/24 0738    Procedures:       Creation Pericardial Window (Right: Chest) - Double Lumen ET tube, plan for VATS approach      Thoracoscopy (Right: Chest)    Location: [facility] 10 / [facility]    Surgeons: Beto Giles MD            Relevant Problems   Anesthesia (within normal limits)      Cardiac   (+) Hyperlipidemia      Pulmonary   (+) Malignant neoplasm metastatic to both lungs (Multi)   (+) Multiple pulmonary nodules   (+) Pneumonia due to infectious organism      Neuro   (+) Cerebrovascular accident (CVA) (Multi)      Liver   (+) Gallbladder sludge   (+) Pancreatitis, acute ([facility]CC)      ID   (+) Pneumonia due to infectious organism   (+) Viral upper respiratory tract infection with cough      GYN   (+) Malignant neoplasm of upper-inner quadrant of right female breast (Multi)       Clinical information reviewed:    Allergies  Meds               NPO Detail:  No data recorded     Physical Exam    Airway  Mallampati: II  TM distance: >3 FB  Neck ROM: full     Cardiovascular   Rhythm: regular  Rate: abnormal     Dental - normal exam     Pulmonary - normal exam     Abdominal - normal exam             Anesthesia Plan    History of general anesthesia?: yes  History of complications of general anesthesia?: no    ASA 4     general     The patient is not a current smoker.  Patient was previously instructed to abstain from smoking on day of procedure.  Patient did not smoke on day of procedure.    intravenous induction   Postoperative administration of opioids is intended.  Trial extubation is planned.  Anesthetic plan and risks discussed with patient.  Use of blood products discussed with patient who consented to blood products.    Plan discussed with CRNA.

## 2024-05-02 NOTE — PROGRESS NOTES
Medication Adjustment    The following medication(s) was/were adjusted for Ayesha Nova per protocol/policy due to altered renal function.    Medication(s) adjusted:   Cefepim 2g q12 changed to q8 due to clearance 125 ml/min    Burt Fiore [facility]

## 2024-05-02 NOTE — CARE PLAN
The clinical goals for the shift include Patient to be hemodynamically stable    Patient met this goal. Patient had a suspected allergic reaction to vancomycin. Dr. Giles came in to evaluate the patient and decided it is okay to wait until her scheduled time for surgery. Patient is stable. Wheezing has decreased and patient feels better.     Problem: Pain  Goal: My pain/discomfort is manageable  Outcome: Not Progressing     Problem: Respiratory  Goal: Minimize anxiety/maximize coping throughout shift  Outcome: Not Progressing  Goal: No signs of respiratory distress (eg. Use of accessory muscles. Peds grunting)  Outcome: Not Progressing       Problem: Daily Care  Goal: Daily care needs are met  Outcome: Progressing     Problem: Respiratory  Goal: Clear secretions with interventions this shift  Outcome: Progressing     Problem: Respiratory  Goal: Patent airway maintained this shift  Outcome: Met     Problem: Fall/Injury  Goal: Not fall by end of shift  Outcome: Met  Goal: Be free from injury by end of the shift  Outcome: Met

## 2024-05-02 NOTE — OP NOTE
Creation Pericardial Window (R), Thoracoscopy (R) Operative Note     Date: 2024  OR Location: [facility]    Name: Ayesha Nova, : 1970, Age: 53 y.o., MRN: [id number], Sex: female    Diagnosis  Pre-op Diagnosis     * Pericardial effusion (HHS-HCC) [I31.39] Post-op Diagnosis     * Pericardial effusion (HHS-HCC) [I31.39]     Procedures  Creation Pericardial Window  67622 - SD CRTJ PERICARDIAL WINDOW/PRTL RESECJ W/DRG/BX    Thoracoscopy  52542 - SD THORSC DX LUNGS/PERICAR/MED/PLEURAL SPACE W/O BX  Right video-assisted thoracoscopic surgery  With pericardial window    Surgeons      * Beto Giles - Primary    Resident/Fellow/Other Assistant:  Surgeons and Role:  * No surgeons found with a matching role *  Roger Anton MD;  Myles Yousif SA    Procedure Summary  Anesthesia: General  ASA: ASA status not filed in the log.  Anesthesia Staff: Anesthesiologist: Bob Connors MD  CRNA: LEANDER Berry-CRNA  Perfusionist: Mel Xavier  Estimated Blood Loss: minimal   Intra-op Medications:   Administrations occurring from 0730 to 1035 on 24:   Medication Name Total Dose   sodium chloride 0.9 % irrigation solution 3,000 mL   BUPivacaine-EPINEPHrine (Marcaine w/EPI) 0.25 %-1:200,000 injection 40 mL   albuterol 2.5 mg /3 mL (0.083 %) nebulizer solution 2.5 mg Cannot be calculated   dexAMETHasone (Decadron) injection 8 mg Cannot be calculated   dextrose 50 % injection 12.5 g Cannot be calculated   dextrose 50 % injection 25 g Cannot be calculated   famotidine PF (Pepcid) injection 20 mg Cannot be calculated   glucagon (Glucagen) injection 1 mg Cannot be calculated   glucagon (Glucagen) injection 1 mg Cannot be calculated   ipratropium-albuteroL (Duo-Neb) 0.5-2.5 mg/3 mL nebulizer solution 3 mL Cannot be calculated   lactated Ringer's infusion Cannot be calculated              Anesthesia Record               Intraprocedure I/O Totals          Intake    NaCl 0.9 % bolus 1500.00 mL    Total Intake 1500 mL        Output    Urine 100 mL    Est. Blood Loss 10 mL    NG/OG Tube Output 50 mL    Other 1000 mL    Total Output 1160 mL       Net    Net Volume 340 mL          Specimen:   ID Type Source Tests Collected by Time   1 : PERICARDIAL FLUID Non-Gynecologic Cytology PERICARDIAL FINE NEEDLE ASPIRATION CYTOLOGY CONSULTATION (NON-GYNECOLOGIC) Beto Giles MD 5/2/2024 0914   2 : PERICARDIAL TISSUE Tissue SOFT TISSUE RESECTION SURGICAL PATHOLOGY EXAM Beto Giles MD 5/2/2024 0912   A : PERICARDIAL FLUID Fluid PERICARDIAL FINE NEEDLE ASPIRATION STERILE FLUID CULTURE/SMEAR Beto Giles MD 5/2/2024 0915        Staff:   Circulator: Nita Ferrara RN  Relief Circulator: Gabi Bruner RN  Relief Scrub: Marilu Guido  Scrub Person: Kelseydioni Dominguez         Drains and/or Catheters:   Chest Tube 1 Right Pleural 24 Fr (Active)   Dressing Status Clean;Dry 05/02/24 1129   Site Assessment Clean;Dry;Intact 05/02/24 1129       [REMOVED] Urethral Catheter Straight-tip;Non-latex (Removed)       Tourniquet Times: N/A        Implants: N/A    Findings: Given her prior history of thoracotomy and lung resection on the left side I elected to perform a pericardial window on the right side to avoid any complication with adhesions or scar tissue that might complicate a video-assisted thoracoscopic surgery.  There appeared to be some bassem-ostial nodules at the level of the costochondral junction and the inferior seventh eighth and ninth ribs.  The pericardium was markedly thickened, bulging, and there was approximately 1 L of dark bloody fluid removed from the pericardium.  This was sent for Gram stain, culture, and pathology.  A large piece of pericardium was sent to pathology for tissue and cytology.  Periprocedural DIAN showed a very large pericardial effusion prior to intervention, and 0 pericardial effusion after drainage.  This also revealed normal right and left ventricular function.  There is no significant valvular  disease.    Indications: Ayesha Nova is an 53 y.o. female who is having surgery for Pericardial effusion ([facility]) [I31.39]. deja Nova is a 53 y.o. female presenting with known pericardial effusion in the setting of metastatic breast cancer.  She called her cardiologist because of tachycardia and shortness of breath and he recommended she come to the emergency department.  She noted worsening shortness of breath over the last week or so and today she was unable to walk in the house from the living room to the bathroom without becoming dyspneic.  She denies chest pain, denies syncope, denies orthopnea.  She does have occasional coughing.     The patient was seen in the preoperative area. The risks, benefits, complications, treatment options, non-operative alternatives, expected recovery and outcomes were discussed with the patient. The possibilities of reaction to medication, pulmonary aspiration, injury to surrounding structures, bleeding, recurrent infection, the need for additional procedures, failure to diagnose a condition, and creating a complication requiring transfusion or operation were discussed with the patient. The patient concurred with the proposed plan, giving informed consent.  The site of surgery was properly noted/marked if necessary per policy. The patient has been actively warmed in preoperative area. Preoperative antibiotics have been ordered and given within 1 hours of incision. Venous thrombosis prophylaxis have been ordered including chemical prophylaxis    Procedure Details: After informed consent was obtained, and all questions asked and answered the patient's satisfaction, and after positive identification; she was brought to the operative theater.  She was placed on the operating table in supine position, and her prior arterial monitoring line was connected for blood pressure monitoring.  She then underwent induction of general anesthesia orotracheal intubation with a double-lumen  endotracheal tube, and was prepped and draped in standard sterile surgical fashion after being placed in a modified right decubitus position.  A transesophageal echocardiography probe was placed without event.    A surgical timeout was performed with the correct patient, the correct procedure, laterality, timing and dosage of antibiotics, availability of blood, fire risk, sterility of instruments, and all equipment was verified as being present prior to beginning the case.    1/4% Marcaine with epinephrine was utilized to create a field block at the level of the second intercostal space in the midclavicular line.  A 5 mm Visiport with a 0 degree camera was utilized to enter the right thoracic cavity under direct visualization. This trocar was then connected to CO2 insufflation. 1/4% Marcaine with epinephrine was used to create a field block as well as pleural block at the level of the fourth intercostal space.    A second 5 mm port was placed under direct visualization through the fourth intercostal space in the midaxillary line.  1/4% Marcaine with epinephrine was used to create a field block as well as pleural block at the level of the seventh intercostal space. A 12 mm port was placed under direct visualization in the posterior axillary line.     A thoracoscopic Allis clamp was utilized to grab the pericardium.  A LigaSure cautery with a [place] tip was utilized to create an initial hole in the pericardium.  Immediately we had return of the large amount of bloody fluid, and this was suctioned to a Luken's trap and then to the Sharmin.  The LigaSure cautery was then used to create a large 4 cm x 4 cm oval excision of pericardium.  A 24 mm Jf drain was then placed through the 12 mm port medially along the hilum and the pericardium.  The 5 mm port from the second intercostal space was removed under direct visualization showing no bleeding.  The 7 intercostal space port was removed under direct visualization  showing no bleeding, the chest tube was sutured into place with a 0 Tevdek.  The right lung was then ready inflated and the 5 mm port from the fourth intercostal space was removed.    All 5 mm port sites were closed with a 3-0 Polysorb suture in the subcuticular layer, and Dermabond was placed as a biologic dressing.    Complications:  None; patient tolerated the procedure well.    Disposition: ICU - extubated and stable.  Condition: stable     Attending Attestation: I was present and scrubbed for the entire procedure.    Beto Giles  Phone Number: [phone]

## 2024-05-02 NOTE — CARE PLAN
Problem: Pain  Goal: My pain/discomfort is manageable  Outcome: Not Progressing     Problem: Pain - Adult  Goal: Verbalizes/displays adequate comfort level or baseline comfort level  Outcome: Not Progressing     Problem: Daily Care  Goal: Daily care needs are met  Outcome: Progressing     Problem: Respiratory  Goal: Clear secretions with interventions this shift  Outcome: Progressing  Goal: Minimize anxiety/maximize coping throughout shift  Outcome: Progressing  Goal: No signs of respiratory distress (eg. Use of accessory muscles. Peds grunting)  Outcome: Progressing     Problem: Safety - Adult  Goal: Free from fall injury  Outcome: Progressing     Problem: Discharge Planning  Goal: Discharge to home or other facility with appropriate resources  Outcome: Progressing     Problem: Chronic Conditions and Co-morbidities  Goal: Patient's chronic conditions and co-morbidity symptoms are monitored and maintained or improved  Outcome: Progressing     Problem: Safety  Goal: Patient will be injury free during hospitalization  Outcome: Progressing  Goal: I will remain free of falls  Outcome: Progressing     Problem: Psychosocial Needs  Goal: Demonstrates ability to cope with hospitalization/illness  Outcome: Progressing  Goal: Collaborate with me, my family, and caregiver to identify my specific goals  Outcome: Progressing     Problem: Discharge Barriers  Goal: My discharge needs are met  Outcome: Progressing   The patient's goals for the shift include      The clinical goals for the shift include pt to have surgery today    Pt had surgery this morning to remove fluid. Pt back in bed resting, pt states having pain but at this time refusing pain medications. Pt alert and oriented, vitals WNL, resting.     Problem: Pain  Goal: My pain/discomfort is manageable  Outcome: Not Progressing     Problem: Pain - Adult  Goal: Verbalizes/displays adequate comfort level or baseline comfort level  Outcome: Not Progressing

## 2024-05-03 ENCOUNTER — HOSPITAL ENCOUNTER (OUTPATIENT)
Dept: CARDIOLOGY | Facility: HOSPITAL | Age: 54
Discharge: HOME | DRG: 598 | End: 2024-05-03
Payer: COMMERCIAL

## 2024-05-03 ENCOUNTER — APPOINTMENT (OUTPATIENT)
Dept: RADIOLOGY | Facility: HOSPITAL | Age: 54
DRG: 598 | End: 2024-05-03
Payer: COMMERCIAL

## 2024-05-03 LAB
ALBUMIN SERPL BCP-MCNC: 3.4 G/DL (ref 3.4–5)
ANION GAP BLDA CALCULATED.4IONS-SCNC: 10 MMO/L (ref 10–25)
ANION GAP BLDA CALCULATED.4IONS-SCNC: 14 MMO/L (ref 10–25)
ANION GAP SERPL CALC-SCNC: 11 MMOL/L (ref 10–20)
APPARATUS: ABNORMAL
BASE EXCESS BLDA CALC-SCNC: -0.2 MMOL/L (ref -2–3)
BASE EXCESS BLDA CALC-SCNC: -2.9 MMOL/L (ref -2–3)
BASE EXCESS BLDA CALC-SCNC: NORMAL MMOL/L
BODY TEMPERATURE: 37 DEGREES CELSIUS
BODY TEMPERATURE: ABNORMAL
BODY TEMPERATURE: NORMAL
BUN SERPL-MCNC: 7 MG/DL (ref 6–23)
CA-I BLD-SCNC: 1.22 MMOL/L (ref 1.1–1.33)
CA-I BLDA-SCNC: 1.16 MMOL/L (ref 1.1–1.33)
CA-I BLDA-SCNC: 1.21 MMOL/L (ref 1.1–1.33)
CALCIUM SERPL-MCNC: 8.8 MG/DL (ref 8.6–10.3)
CHLORIDE BLDA-SCNC: 106 MMOL/L (ref 98–107)
CHLORIDE BLDA-SCNC: 106 MMOL/L (ref 98–107)
CHLORIDE SERPL-SCNC: 107 MMOL/L (ref 98–107)
CO2 SERPL-SCNC: 24 MMOL/L (ref 21–32)
CREAT SERPL-MCNC: 0.57 MG/DL (ref 0.5–1.05)
EGFRCR SERPLBLD CKD-EPI 2021: >90 ML/MIN/1.73M*2
ERYTHROCYTE [DISTWIDTH] IN BLOOD BY AUTOMATED COUNT: 13.8 % (ref 11.5–14.5)
GLUCOSE BLD MANUAL STRIP-MCNC: 100 MG/DL (ref 74–99)
GLUCOSE BLD MANUAL STRIP-MCNC: 108 MG/DL (ref 74–99)
GLUCOSE BLD MANUAL STRIP-MCNC: 135 MG/DL (ref 74–99)
GLUCOSE BLDA-MCNC: 108 MG/DL (ref 74–99)
GLUCOSE BLDA-MCNC: 121 MG/DL (ref 74–99)
GLUCOSE SERPL-MCNC: 113 MG/DL (ref 74–99)
HCO3 BLDA-SCNC: 19.7 MMOL/L (ref 22–26)
HCO3 BLDA-SCNC: 23.9 MMOL/L (ref 22–26)
HCO3 BLDA-SCNC: NORMAL MMOL/L
HCT VFR BLD AUTO: 33.5 % (ref 36–46)
HCT VFR BLD EST: 33 % (ref 36–46)
HCT VFR BLD EST: 34 % (ref 36–46)
HGB BLD-MCNC: 11 G/DL (ref 12–16)
HGB BLDA-MCNC: 10.9 G/DL (ref 12–16)
HGB BLDA-MCNC: 11.4 G/DL (ref 12–16)
INHALED O2 CONCENTRATION: 21 %
INHALED O2 CONCENTRATION: 21 %
INHALED O2 CONCENTRATION: NORMAL %
LACTATE BLDA-SCNC: 0.8 MMOL/L (ref 0.4–2)
LACTATE BLDA-SCNC: 1.3 MMOL/L (ref 0.4–2)
MAGNESIUM SERPL-MCNC: 2.02 MG/DL (ref 1.6–2.4)
MCH RBC QN AUTO: 29.3 PG (ref 26–34)
MCHC RBC AUTO-ENTMCNC: 32.8 G/DL (ref 32–36)
MCV RBC AUTO: 89 FL (ref 80–100)
NRBC BLD-RTO: 0 /100 WBCS (ref 0–0)
OXYHGB MFR BLDA: 95.3 % (ref 94–98)
OXYHGB MFR BLDA: 96.1 % (ref 94–98)
OXYHGB MFR BLDA: NORMAL %
PCO2 BLDA: 27 MM HG (ref 38–42)
PCO2 BLDA: 36 MM HG (ref 38–42)
PCO2 BLDA: NORMAL MM[HG]
PH BLDA: 7.43 PH (ref 7.38–7.42)
PH BLDA: 7.47 PH (ref 7.38–7.42)
PH BLDA: NORMAL [PH]
PHOSPHATE SERPL-MCNC: 3.2 MG/DL (ref 2.5–4.9)
PLATELET # BLD AUTO: 244 X10*3/UL (ref 150–450)
PO2 BLDA: 76 MM HG (ref 85–95)
PO2 BLDA: 83 MM HG (ref 85–95)
PO2 BLDA: NORMAL MM[HG]
POTASSIUM BLDA-SCNC: 3.4 MMOL/L (ref 3.5–5.3)
POTASSIUM BLDA-SCNC: 4.2 MMOL/L (ref 3.5–5.3)
POTASSIUM SERPL-SCNC: 4.4 MMOL/L (ref 3.5–5.3)
RBC # BLD AUTO: 3.76 X10*6/UL (ref 4–5.2)
SAO2 % BLDA: 98 % (ref 94–100)
SAO2 % BLDA: 99 % (ref 94–100)
SAO2 % BLDA: NORMAL %
SODIUM BLDA-SCNC: 136 MMOL/L (ref 136–145)
SODIUM BLDA-SCNC: 136 MMOL/L (ref 136–145)
SODIUM SERPL-SCNC: 138 MMOL/L (ref 136–145)
SPECIMEN DRAWN FROM PATIENT: ABNORMAL
SPECIMEN DRAWN FROM PATIENT: ABNORMAL
SPECIMEN DRAWN FROM PATIENT: NORMAL
WBC # BLD AUTO: 9.9 X10*3/UL (ref 4.4–11.3)

## 2024-05-03 PROCEDURE — 2500000001 HC RX 250 WO HCPCS SELF ADMINISTERED DRUGS (ALT 637 FOR MEDICARE OP): Performed by: NURSE PRACTITIONER

## 2024-05-03 PROCEDURE — 80069 RENAL FUNCTION PANEL: CPT | Performed by: NURSE PRACTITIONER

## 2024-05-03 PROCEDURE — 99223 1ST HOSP IP/OBS HIGH 75: CPT

## 2024-05-03 PROCEDURE — 97165 OT EVAL LOW COMPLEX 30 MIN: CPT | Mod: GO

## 2024-05-03 PROCEDURE — 94669 MECHANICAL CHEST WALL OSCILL: CPT

## 2024-05-03 PROCEDURE — 37799 UNLISTED PX VASCULAR SURGERY: CPT | Performed by: NURSE PRACTITIONER

## 2024-05-03 PROCEDURE — 82947 ASSAY GLUCOSE BLOOD QUANT: CPT

## 2024-05-03 PROCEDURE — 71045 X-RAY EXAM CHEST 1 VIEW: CPT

## 2024-05-03 PROCEDURE — 85027 COMPLETE CBC AUTOMATED: CPT | Performed by: NURSE PRACTITIONER

## 2024-05-03 PROCEDURE — 2500000004 HC RX 250 GENERAL PHARMACY W/ HCPCS (ALT 636 FOR OP/ED): Performed by: HOSPITALIST

## 2024-05-03 PROCEDURE — 93010 ELECTROCARDIOGRAM REPORT: CPT | Performed by: INTERNAL MEDICINE

## 2024-05-03 PROCEDURE — 2020000001 HC ICU ROOM DAILY

## 2024-05-03 PROCEDURE — 2500000004 HC RX 250 GENERAL PHARMACY W/ HCPCS (ALT 636 FOR OP/ED): Mod: JZ | Performed by: NURSE PRACTITIONER

## 2024-05-03 PROCEDURE — 97161 PT EVAL LOW COMPLEX 20 MIN: CPT | Mod: GP

## 2024-05-03 PROCEDURE — 93005 ELECTROCARDIOGRAM TRACING: CPT

## 2024-05-03 PROCEDURE — 99232 SBSQ HOSP IP/OBS MODERATE 35: CPT | Performed by: STUDENT IN AN ORGANIZED HEALTH CARE EDUCATION/TRAINING PROGRAM

## 2024-05-03 PROCEDURE — 83735 ASSAY OF MAGNESIUM: CPT | Performed by: NURSE PRACTITIONER

## 2024-05-03 PROCEDURE — 71045 X-RAY EXAM CHEST 1 VIEW: CPT | Performed by: RADIOLOGY

## 2024-05-03 PROCEDURE — 82330 ASSAY OF CALCIUM: CPT | Performed by: NURSE PRACTITIONER

## 2024-05-03 PROCEDURE — 84132 ASSAY OF SERUM POTASSIUM: CPT | Performed by: NURSE PRACTITIONER

## 2024-05-03 PROCEDURE — 2500000004 HC RX 250 GENERAL PHARMACY W/ HCPCS (ALT 636 FOR OP/ED)

## 2024-05-03 PROCEDURE — 2500000004 HC RX 250 GENERAL PHARMACY W/ HCPCS (ALT 636 FOR OP/ED): Performed by: NURSE PRACTITIONER

## 2024-05-03 RX ORDER — THYROID 30 MG/1
30 TABLET ORAL DAILY
Status: DISCONTINUED | OUTPATIENT
Start: 2024-05-03 | End: 2024-05-04 | Stop reason: HOSPADM

## 2024-05-03 RX ADMIN — CEFAZOLIN SODIUM 2 G: 2 INJECTION, SOLUTION INTRAVENOUS at 00:27

## 2024-05-03 RX ADMIN — FAMOTIDINE 20 MG: 10 INJECTION, SOLUTION INTRAVENOUS at 08:35

## 2024-05-03 RX ADMIN — CEFAZOLIN SODIUM 2 G: 2 INJECTION, SOLUTION INTRAVENOUS at 08:33

## 2024-05-03 RX ADMIN — ENOXAPARIN SODIUM 40 MG: 40 INJECTION SUBCUTANEOUS at 08:29

## 2024-05-03 RX ADMIN — SENNOSIDES AND DOCUSATE SODIUM 2 TABLET: 50; 8.6 TABLET ORAL at 22:10

## 2024-05-03 RX ADMIN — HYDROMORPHONE HYDROCHLORIDE 0.3 MG: 1 INJECTION, SOLUTION INTRAMUSCULAR; INTRAVENOUS; SUBCUTANEOUS at 14:36

## 2024-05-03 RX ADMIN — CEFAZOLIN SODIUM 2 G: 2 INJECTION, SOLUTION INTRAVENOUS at 23:41

## 2024-05-03 RX ADMIN — DEXAMETHASONE SODIUM PHOSPHATE 4 MG: 4 INJECTION, SOLUTION INTRAMUSCULAR; INTRAVENOUS at 23:41

## 2024-05-03 RX ADMIN — SENNOSIDES AND DOCUSATE SODIUM 2 TABLET: 50; 8.6 TABLET ORAL at 08:30

## 2024-05-03 RX ADMIN — HYDROMORPHONE HYDROCHLORIDE 0.2 MG: 0.2 INJECTION, SOLUTION INTRAMUSCULAR; INTRAVENOUS; SUBCUTANEOUS at 08:35

## 2024-05-03 RX ADMIN — DEXAMETHASONE SODIUM PHOSPHATE 4 MG: 4 INJECTION, SOLUTION INTRAMUSCULAR; INTRAVENOUS at 15:17

## 2024-05-03 RX ADMIN — DEXAMETHASONE SODIUM PHOSPHATE 4 MG: 4 INJECTION, SOLUTION INTRAMUSCULAR; INTRAVENOUS at 08:39

## 2024-05-03 RX ADMIN — DEXAMETHASONE SODIUM PHOSPHATE 4 MG: 4 INJECTION, SOLUTION INTRAMUSCULAR; INTRAVENOUS at 00:27

## 2024-05-03 RX ADMIN — CEFAZOLIN SODIUM 2 G: 2 INJECTION, SOLUTION INTRAVENOUS at 15:17

## 2024-05-03 ASSESSMENT — COGNITIVE AND FUNCTIONAL STATUS - GENERAL
DAILY ACTIVITIY SCORE: 24
MOBILITY SCORE: 24

## 2024-05-03 ASSESSMENT — PAIN - FUNCTIONAL ASSESSMENT
PAIN_FUNCTIONAL_ASSESSMENT: 0-10

## 2024-05-03 ASSESSMENT — ENCOUNTER SYMPTOMS
LIGHT-HEADEDNESS: 0
IRREGULAR HEARTBEAT: 0
VOMITING: 0
NAUSEA: 0
PALPITATIONS: 0
BLOATING: 0
NEUROLOGICAL NEGATIVE: 1
DECREASED APPETITE: 0
CARDIOVASCULAR NEGATIVE: 1
RESPIRATORY NEGATIVE: 1
FREQUENCY: 0
NAIL CHANGES: 0
MYALGIAS: 1
ALLERGIC/IMMUNOLOGIC NEGATIVE: 1
PSYCHIATRIC NEGATIVE: 1
SHORTNESS OF BREATH: 0
WEAKNESS: 0
EYES NEGATIVE: 1
ORTHOPNEA: 0
CONSTITUTIONAL NEGATIVE: 1
ENDOCRINE NEGATIVE: 1
MUSCLE CRAMPS: 0
DYSPNEA ON EXERTION: 0
ALTERED MENTAL STATUS: 0
FOCAL WEAKNESS: 0
HEMATOLOGIC/LYMPHATIC NEGATIVE: 1
DOUBLE VISION: 0
MYALGIAS: 0
COUGH: 1
POOR WOUND HEALING: 0
GASTROINTESTINAL NEGATIVE: 1
DIZZINESS: 0
STIFFNESS: 0
BLURRED VISION: 0

## 2024-05-03 ASSESSMENT — PAIN SCALES - GENERAL
PAINLEVEL_OUTOF10: 0 - NO PAIN
PAINLEVEL_OUTOF10: 7
PAINLEVEL_OUTOF10: 3
PAINLEVEL_OUTOF10: 3
PAINLEVEL_OUTOF10: 0 - NO PAIN
PAINLEVEL_OUTOF10: 0 - NO PAIN
PAINLEVEL_OUTOF10: 6
PAINLEVEL_OUTOF10: 8
PAINLEVEL_OUTOF10: 3

## 2024-05-03 ASSESSMENT — PAIN DESCRIPTION - ORIENTATION: ORIENTATION: RIGHT

## 2024-05-03 ASSESSMENT — PAIN SCALES - WONG BAKER: WONGBAKER_NUMERICALRESPONSE: HURTS EVEN MORE

## 2024-05-03 ASSESSMENT — PAIN DESCRIPTION - LOCATION: LOCATION: BREAST

## 2024-05-03 NOTE — PROGRESS NOTES
"Cardiology Consult Progress Note:    Interval History:  - Remains hemodynamically stable  - Improved pain control with hydromorphone; palliative care following for symptoms management    Objective:     Inpatient Medications:  Scheduled medications   Medication Dose Route Frequency    acetaminophen  650 mg oral q6h    ceFAZolin  2 g intravenous q8h    dexAMETHasone  4 mg intravenous q8h    enoxaparin  40 mg subcutaneous Daily    famotidine  20 mg intravenous q12h CHRIS    ipratropium-albuteroL  3 mL nebulization q6h    lidocaine  1 patch transdermal q24h    sennosides-docusate sodium  2 tablet oral BID    thyroid (pork)  30 mg oral Daily       PRN medications   Medication    albuterol    dextrose    dextrose    glucagon    glucagon    HYDROmorphone    HYDROmorphone    naloxone    ondansetron       Continuous Medications   Medication Dose Last Rate    lactated Ringer's  5 mL/hr 5 mL/hr (05/02/24 1113)       Intake / Output Summary:     Intake/Output Summary (Last 24 hours) at 5/3/2024 1343  Last data filed at 5/3/2024 1200  Gross per 24 hour   Intake 200 ml   Output 2380 ml   Net -2180 ml       Net IO Since Admission: 115.42 mL [05/03/24 1343]    Physical Exam:  /63 (BP Location: Left arm)   Pulse 87   Temp 36.1 °C (97 °F) (Temporal)   Resp 20   Ht 1.6 m (5' 2.99\")   Wt 67.9 kg (149 lb 12.8 oz)   SpO2 96%   BMI 26.54 kg/m²     Physical Exam  Constitutional:       General: She is not in acute distress.  HENT:      Head: Normocephalic.      Mouth/Throat:      Mouth: Mucous membranes are moist.   Eyes:      Extraocular Movements: Extraocular movements intact.      Conjunctiva/sclera: Conjunctivae normal.   Neck:      Vascular: No carotid bruit or JVD.   Cardiovascular:      Rate and Rhythm: Normal rate and regular rhythm.      Pulses: Normal pulses.      Heart sounds: No murmur heard.     Comments: Chest tube in place draining serosanguinous fluid   Pulmonary:      Effort: Pulmonary effort is normal. No " respiratory distress.      Breath sounds: Normal breath sounds.   Abdominal:      General: Bowel sounds are normal. There is no distension.      Palpations: Abdomen is soft.   Musculoskeletal:         General: No swelling.   Skin:     General: Skin is warm and dry.   Neurological:      General: No focal deficit present.      Mental Status: She is alert.      Cranial Nerves: No cranial nerve deficit.      Motor: No weakness.   Psychiatric:         Mood and Affect: Mood normal.         Behavior: Behavior normal.         Lab/Radiology/Diagnostic Review:    Labs  Results for orders placed or performed during the hospital encounter of 05/01/24 (from the past 24 hour(s))   POCT GLUCOSE   Result Value Ref Range    POCT Glucose 112 (H) 74 - 99 mg/dL   POCT GLUCOSE   Result Value Ref Range    POCT Glucose 124 (H) 74 - 99 mg/dL   Calcium, Ionized   Result Value Ref Range    POCT Calcium, Ionized 1.22 1.1 - 1.33 mmol/L   Magnesium   Result Value Ref Range    Magnesium 2.02 1.60 - 2.40 mg/dL   CBC   Result Value Ref Range    WBC 9.9 4.4 - 11.3 x10*3/uL    nRBC 0.0 0.0 - 0.0 /100 WBCs    RBC 3.76 (L) 4.00 - 5.20 x10*6/uL    Hemoglobin 11.0 (L) 12.0 - 16.0 g/dL    Hematocrit 33.5 (L) 36.0 - 46.0 %    MCV 89 80 - 100 fL    MCH 29.3 26.0 - 34.0 pg    MCHC 32.8 32.0 - 36.0 g/dL    RDW 13.8 11.5 - 14.5 %    Platelets 244 150 - 450 x10*3/uL   Renal Function Panel   Result Value Ref Range    Glucose 113 (H) 74 - 99 mg/dL    Sodium 138 136 - 145 mmol/L    Potassium 4.4 3.5 - 5.3 mmol/L    Chloride 107 98 - 107 mmol/L    Bicarbonate 24 21 - 32 mmol/L    Anion Gap 11 10 - 20 mmol/L    Urea Nitrogen 7 6 - 23 mg/dL    Creatinine 0.57 0.50 - 1.05 mg/dL    eGFR >90 >60 mL/min/1.73m*2    Calcium 8.8 8.6 - 10.3 mg/dL    Phosphorus 3.2 2.5 - 4.9 mg/dL    Albumin 3.4 3.4 - 5.0 g/dL   BLOOD GAS ARTERIAL FULL PANEL   Result Value Ref Range    POCT pH, Arterial 7.43 (H) 7.38 - 7.42 pH    POCT pCO2, Arterial 36 (L) 38 - 42 mm Hg    POCT pO2, Arterial  76 (L) 85 - 95 mm Hg    POCT SO2, Arterial 98 94 - 100 %    POCT Oxy Hemoglobin, Arterial 95.3 94.0 - 98.0 %    POCT Hematocrit Calculated, Arterial 34.0 (L) 36.0 - 46.0 %    POCT Sodium, Arterial 136 136 - 145 mmol/L    POCT Potassium, Arterial 4.2 3.5 - 5.3 mmol/L    POCT Chloride, Arterial 106 98 - 107 mmol/L    POCT Ionized Calcium, Arterial 1.21 1.10 - 1.33 mmol/L    POCT Glucose, Arterial 108 (H) 74 - 99 mg/dL    POCT Lactate, Arterial 0.8 0.4 - 2.0 mmol/L    POCT Base Excess, Arterial -0.2 -2.0 - 3.0 mmol/L    POCT HCO3 Calculated, Arterial 23.9 22.0 - 26.0 mmol/L    POCT Hemoglobin, Arterial 11.4 (L) 12.0 - 16.0 g/dL    POCT Anion Gap, Arterial 10 10 - 25 mmo/L    Patient Temperature      FiO2 21 %    Site of Arterial Puncture Arterial Line    POCT GLUCOSE   Result Value Ref Range    POCT Glucose 108 (H) 74 - 99 mg/dL   POCT GLUCOSE   Result Value Ref Range    POCT Glucose 100 (H) 74 - 99 mg/dL       Peripheral IV 05/01/24 18 G Left Antecubital (Active)   Placement Date/Time: 05/01/24 1927   Size (Gauge): 18 G  Orientation: Left  Location: Antecubital  Site Prep: Chlorhexidine    Number of days: 1       Peripheral IV 05/01/24 18 G Right Antecubital (Active)   Placement Date/Time: 05/01/24 1928   Size (Gauge): 18 G  Orientation: Right  Location: Antecubital  Site Prep: Chlorhexidine    Number of days: 1       Chest Tube 1 Right Pleural 24 Fr (Active)   Placement Date/Time: 05/02/24 0943   Placed by: DR. PARKS  Hand Hygiene Completed: Yes  Tube Number: 1  Chest Tube Orientation: Right  Chest Tube Location: Pleural  Chest Tube Drain Tube Size (Fr): 24 Fr  Chest Tube Drainage System: Dry seal chest d...   Number of days: 1        Troponin I, High Sensitivity   Date/Time Value Ref Range Status   05/01/2024 07:24 PM 4 0 - 13 ng/L Final   03/07/2024 01:03 PM <3 0 - 13 ng/L Final     Troponin I   Date/Time Value Ref Range Status   11/13/2022 06:34 AM <3 0 - 13 ng/L Final     Comment:     .  Less than 99th  percentile of normal range cutoff-  Female and children under 18 years old <14 ng/L; Male <21 ng/L: Negative  Repeat testing should be performed if clinically indicated.   .  Female and children under 18 years old 14-50 ng/L; Male 21-50 ng/L:  Consistent with possible cardiac damage and possible increased clinical   risk. Serial measurements may help to assess extent of myocardial damage.   .  >50 ng/L: Consistent with cardiac damage, increased clinical risk and  myocardial infarction. Serial measurements may help assess extent of   myocardial damage.   .   NOTE: Children less than 1 year old may have higher baseline troponin   levels and results should be interpreted in conjunction with the overall   clinical context.   .  NOTE: Troponin I testing is performed using a different   testing methodology at [facility] than at other   [facility]. Direct result comparisons should only   be made within the same method.       BNP   Date/Time Value Ref Range Status   05/01/2024 07:24 PM 21 0 - 99 pg/mL Final   03/07/2024 01:03 PM 35 0 - 99 pg/mL Final     VLDL   Date/Time Value Ref Range Status   08/23/2019 08:50 AM 13 0 - 40 mg/dL Final     Cardiac Imaging (personally reviewed):    TTE (5/2/2024)  1. Left ventricular systolic function is normal with a 55-60% estimated ejection fraction.  2. Echocardiographic findings are consistent with cardiac tamponade.  3. There is a large pericardial effusion.    Assessment:   53 y.o.  female who presented with tachycardia and dyspnea in setting of pericardial effusion. Past medical history of metastatic breast cancer with pulmonary metastasis, pericardial effusion.       Given symptomatic pericardial effusion with possible tamponade patient underwent video-assisted thoracoscopic pericardial window with Dr. Giles. Cytology and cultures pending. Suspect malignant effusion given history of metastatic breast cancer.         Overall Recommendations:  1.   Pericardial effusion  - Suspect malignant pericardial effusion  - Underwent video-assisted thorascopic pericardial window given symptomatic effusion; cardiac tamponade  - Await cytology and fluid analysis  - If patient continues to have sharp pleuritic chest pain would then consider trial of colchicine     2.  Sinus tachycardia (improving)  - In the setting of pain; possible component of pericarditis; postoperative pain  - Treat underlying exacerbating factors     3.  History of metastatic breast cancer  - Management as per primary team and oncology     Thank you for the cardiology consult. We will follow with you.      Chas Jorgensen MD

## 2024-05-03 NOTE — PROGRESS NOTES
Occupational Therapy    Evaluation    Patient Name: Ayesha Nova  MRN: [id number]  Today's Date: 5/3/2024  Time Calculation  Start Time: 1053  Stop Time: 1117  Time Calculation (min): 24 min        Assessment:  End of Session Communication: Bedside nurse  End of Session Patient Position:  (recliner)  OT Assessment Results:  (no OT needs at this time)    Plan:  No Skilled OT: No acute OT goals identified  OT Frequency: OT eval only  OT Discharge Recommendations: No further acute OT  OT - OK to Discharge: Yes (to next level of care when medically stable)       Subjective   Current Problem:  1. Pericardial effusion (HHS-HCC)  Transthoracic Echo (TTE) Complete    Surgical Pathology Exam    Surgical Pathology Exam    Cytology Consultation (Non-Gynecologic)    Cytology Consultation (Non-Gynecologic)    Sterile Fluid Culture/Smear    Sterile Fluid Culture/Smear    Transesophageal Echo (DIAN)    Transesophageal Echo (DIAN)      2. Tachycardia  Transesophageal Echo (DIAN)    Transesophageal Echo (DIAN)      3. Malignant neoplasm of upper-inner quadrant of right female breast, unspecified estrogen receptor status (Multi)  Transesophageal Echo (DIAN)    Transesophageal Echo (DIAN)      4. Palpitations  Transesophageal Echo (DIAN)    Transesophageal Echo (DIAN)      5. Malignant pericardial effusion in diseases classified elsewhere (Multi)  Transesophageal Echo (DIAN)        General:  General  Reason for Referral: Patient admitted with tachycardia and dyspnea   Dx: Pericardial effusion s/p thoracoscopic pericardial window  Referred By: Jason Ellison CNP  Past Medical History Relevant to Rehab: CVA, HLD, TTE, pancreatitis, malignant neoplasm right breast, malignant neoplasm mets bilateral lungs  Prior to Session Communication: Bedside nurse  Patient Position Received:  (sitting in recliner)  General Comment: patient pleasant and motivated    Precautions:  Medical Precautions: Fall precautions, Cardiac precautions       Pain:  Pain  Assessment  Pain Assessment: 0-10  Pain Score: 7  Pain Location:  (tub insertion site)  Pain Interventions: Medication (See MAR), Repositioned    Objective   Cognition:  Overall Cognitive Status: Within Functional Limits  Orientation Level: Oriented X4           Home Living:  Type of Home: House (ranch style house; 3 entry steps with rail)  Lives With:  (spouse, mother, 2 adult children; all work except mother who has dementia)  Bathroom Shower/Tub: Tub/shower unit  Bathroom Toilet: Standard  Bathroom Equipment: Grab bars in shower    Prior Function:  Level of [place]: Independent with ADLs and functional transfers, Independent with homemaking with ambulation (not working, drives, no device used)       ADL:  Eating Assistance: Independent  Grooming Assistance: Independent  LE Dressing Assistance: Independent  Toileting Assistance with Device: Independent       Bed Mobility/Transfers:    Transfers  Transfer:  (mod I for sit to stand transfers from recliner)      Functional Mobility:  Functional Mobility  Functional Mobility Performed:  (independent for mobility without device; good tolerance)    Sensation:  Light Touch: No apparent deficits    Strength:  Strength Comments: Strength WFL bilaterally    Hand Function:  Gross Grasp: Functional  Coordination: Functional    Extremities: RUE   RUE : Within Functional Limits    LUE   LUE: Within Functional Limits      Outcome Measures:[facility] Daily Activity  Putting on and taking off regular lower body clothing: None  Bathing (including washing, rinsing, drying): None  Putting on and taking off regular upper body clothing: None  Toileting, which includes using toilet, bedpan or urinal: None  Taking care of personal grooming such as brushing teeth: None  Eating Meals: None  Daily Activity - Total Score: 24           EDUCATION:  Education  Individual(s) Educated: Patient  Education Provided:  (results of eval)  Patient Response to Education: Patient/Caregiver Verbalized  Understanding of Information

## 2024-05-03 NOTE — CARE PLAN
The patient's goals for the shift include  chest tube removal.    The clinical goals for the shift include increased level of comfort.    Over the shift, the patient did make progress toward the following goals.

## 2024-05-03 NOTE — PROGRESS NOTES
"Subjective    In better spirits this a.m.  Pain adequately controlled.  Occasionally using the incentive spirometer.  Ready to upgrade her diet.  No open to chemotherapy.  Possibly open to hormone therapy, states that \"she is fine.\"    Objective   Physical Exam  General: Sitting in chair comfortably  HEENT: Normocephalic, atraumatic, pupils equally reactive to light  Chest: Clear to auscultation bilaterally, right chest tube in place  Heart: Regular rate and rhythm  Abdomen: Soft, nontender, nondistended  Extremities: No swelling   Neuro: Alert oriented x 4.  No focal deficits identified.  Psych: Appropriate    Last Recorded Vitals  /59 (BP Location: Left arm)   Pulse 83   Temp 36.1 °C (97 °F) (Temporal)   Resp 18   Wt 67.9 kg (149 lb 12.8 oz)   SpO2 95%   Intake/Output last 3 Shifts:    Intake/Output Summary (Last 24 hours) at 5/3/2024 1123  Last data filed at 5/3/2024 0903  Gross per 24 hour   Intake 200 ml   Output 2105 ml   Net -1905 ml     Assessment/Plan     53-year-old with history of metastatic breast cancer (originally diagnosed in 2016, triple positive) to lung, hypothyroidism presented on 5/1 per advisement of her cardiologist, she was having tachycardia, chest tightness, shortness of breath, worsening fatigue for the last few weeks.     Neurological System:  #Postoperative pain management  -After extensive discussion, patient agreeable to scheduled Tylenol and Dilaudid for moderate to severe pain.    -Monitor for ICU delirium     Cardiovascular System:  #Large pericardial effusion, in setting of metastatic cancer  #Status post pericardial window, 5/2  -Initially presented with tachycardia as high as 130s. Has now resolved. Hemodynamically stable.  -Admitted to cardiac ICU. Cardiothoracic surgery following. Monitor chest tube output (possible removal today) and serial CXR.     Respiratory System:  #Postop respiratory insufficiency  #Right middle lobe consolidation/atelectasis  -Extubated " without difficulty to room air 5/2  -Incentive spirometer, flutter valve, respiratory hygiene.     Gastrointestinal System:  -Diet as below  -bowel regimen     Endocrine System:  #Hypothyroidism, history of  -Continue home thyroid medication     Renal System:  -Monitor ins/outs, RFP/mag     Hematological/oncological system:  #Metastatic breast cancer to lung  -Breast cancer originally diagnosed in 2016, triple positive. Records reviewed. Patient has declined chemotherapy/radiation numerous times in the past. Has been evaluated by oncology at [facility], [facility]. Patient prefers holistic approach to her medical care. Unfortunately, diagnosed with metastatic breast cancer in 2018 after resection of a suspicious lung nodule. Had been lost to follow-up until 2021, saw Dr. Carolina, recommending treatment, however pt still declining.  Palliative care spoke with patient exam.     Infection Disease System:  -no active issues     Checklist  Vent/O2: Room air  Lines/Devices: Peripheral IV, arterial line (can be removed today), right chest tube  ATBx: --  Diet: Regular  PPX: lovenox  Code: Full        Areli Gaytan,   Internal Medicine, PGY-II

## 2024-05-03 NOTE — CONSULTS
Inpatient consult to Palliative Care  Consult performed by: LEANDER Capone-CNP  Consult ordered by: Xochitl Burger MD          Reason For Consult  Reason for Consult: communication / medical decision making     History Of Present Illness  Ayesha Nova is a 53 y.o. female with past medical history of  breast cancer with metastasis to the lungs, and hypothyroidism,  is presenting from her cardiologist's office with tachycardia.  The patient did endorse some chest tightness, and bedside ultrasound showed diastolic collapse with concern for cardiac tamponade; CTS was consulted and recommended pericardial window.  The patient was admitted to the ICU with pericardial effusion and cardiac tamponade.  The patient did undergo VATS pericardial window with right chest tube placement on 5/2/2024.  Of note, the patient has refused treatment for her metastatic cancer at multiple times in the past 8 years, with her last visit to her oncologist 3 years ago; the patient told the ICU team that she wishes to have holistic treatment and care for her cancer.  Cardiology was consulted and noted suspicion that the pericardial effusion is malignant; pericardial fluid cytology is pending.  Palliative care was consulted to discuss goals of care and advance care planning.    Upon assessment of the patient this morning, the patient was sitting in her bedside chair, and reported she has only been having discomfort at the site of her chest tube but has been having well-managed pain management with low-dose as needed Dilaudid.  The patient stated she is otherwise feeling well and has no complaints.  The patient did states she is hoping to have an advanced diet today, as she is hungry.  She stated her goal is to return home with her .  The patient stated she did meet with Dr. Carolina several months ago and is considering hormone therapy.    ESAS ([place] Symptom Assessment System):  Pain: Mild  Shortness of breath: None  Loss of  "appetite: None  Nausea: None  Constipation: None  Tiredness: None  Drowsiness: None  Anxiety: None  Depression: None  How you feel overall: \"I'm feeling pretty well.\"    PPS (Palliative Prognostic Scale): 100%    PPI (Palliative Prognostic Index): 1.0    PMH: as above       Personal/Social History  The patient previously lives at home with her .  She reports that she quit smoking about 4 years ago. Her smoking use included cigarettes. She has never used smokeless tobacco. She reports that she does not drink alcohol and does not use drugs.    Past Medical History  She has a past medical history of Personal history of malignant neoplasm of breast.    Surgical History  She has a past surgical history that includes Hysterectomy (02/29/2016); Other surgical history (04/20/2022); Other surgical history (04/20/2022); and Other surgical history (04/20/2022).     Family History  No family history on file.  Allergies  Morphine and Sulfa (sulfonamide antibiotics)    Review of Systems   Constitutional: Negative.    HENT: Negative.     Eyes: Negative.    Respiratory: Negative.     Cardiovascular: Negative.    Gastrointestinal: Negative.    Endocrine: Negative.    Genitourinary: Negative.    Musculoskeletal:  Positive for myalgias.   Skin: Negative.    Allergic/Immunologic: Negative.    Neurological: Negative.    Hematological: Negative.    Psychiatric/Behavioral: Negative.          Physical Exam  Constitutional:       General: She is not in acute distress.     Appearance: Normal appearance. She is normal weight. She is not toxic-appearing or diaphoretic.   HENT:      Head: Normocephalic and atraumatic.      Right Ear: External ear normal.      Left Ear: External ear normal.      Nose: Nose normal.      Mouth/Throat:      Mouth: Mucous membranes are moist.      Pharynx: Oropharynx is clear.   Eyes:      Extraocular Movements: Extraocular movements intact.      Pupils: Pupils are equal, round, and reactive to light. " "  Cardiovascular:      Rate and Rhythm: Normal rate and regular rhythm.      Pulses: Normal pulses.      Heart sounds: Normal heart sounds.   Pulmonary:      Effort: Pulmonary effort is normal. No respiratory distress.      Comments: Left mid lung noted to have expiratory wheeze  Abdominal:      General: Abdomen is flat. Bowel sounds are normal. There is no distension.      Palpations: Abdomen is soft.      Tenderness: There is no abdominal tenderness.   Musculoskeletal:         General: No swelling or deformity. Normal range of motion.      Cervical back: Normal range of motion. No rigidity.   Skin:     General: Skin is warm and dry.   Neurological:      General: No focal deficit present.      Mental Status: She is alert and oriented to person, place, and time. Mental status is at baseline.   Psychiatric:         Mood and Affect: Mood normal.         Behavior: Behavior normal.         Thought Content: Thought content normal.         Judgment: Judgment normal.         Last Recorded Vitals  Blood pressure 107/74, pulse 87, temperature 36.1 °C (97 °F), temperature source Temporal, resp. rate 19, height 1.6 m (5' 2.99\"), weight 67.9 kg (149 lb 12.8 oz), SpO2 97%.    Relevant Results  Scheduled medications  acetaminophen, 650 mg, oral, q6h  ceFAZolin, 2 g, intravenous, q8h  dexAMETHasone, 4 mg, intravenous, q8h  enoxaparin, 40 mg, subcutaneous, Daily  famotidine, 20 mg, intravenous, q12h CHRIS  ipratropium-albuteroL, 3 mL, nebulization, q6h  lidocaine, 1 patch, transdermal, q24h  sennosides-docusate sodium, 2 tablet, oral, BID      Continuous medications  lactated Ringer's, 5 mL/hr, Last Rate: 5 mL/hr (05/02/24 1113)      PRN medications  PRN medications: albuterol, dextrose, dextrose, glucagon, glucagon, HYDROmorphone, HYDROmorphone, naloxone, ondansetron    Results for orders placed or performed during the hospital encounter of 05/01/24 (from the past 96 hour(s))   B-type natriuretic peptide   Result Value Ref Range    " BNP 21 0 - 99 pg/mL   Lactate   Result Value Ref Range    Lactate 1.2 0.4 - 2.0 mmol/L   CBC and Auto Differential   Result Value Ref Range    WBC 8.1 4.4 - 11.3 x10*3/uL    nRBC 0.0 0.0 - 0.0 /100 WBCs    RBC 4.25 4.00 - 5.20 x10*6/uL    Hemoglobin 12.5 12.0 - 16.0 g/dL    Hematocrit 37.4 36.0 - 46.0 %    MCV 88 80 - 100 fL    MCH 29.4 26.0 - 34.0 pg    MCHC 33.4 32.0 - 36.0 g/dL    RDW 13.9 11.5 - 14.5 %    Platelets 294 150 - 450 x10*3/uL    Neutrophils % 73.0 40.0 - 80.0 %    Immature Granulocytes %, Automated 0.1 0.0 - 0.9 %    Lymphocytes % 18.8 13.0 - 44.0 %    Monocytes % 7.2 2.0 - 10.0 %    Eosinophils % 0.4 0.0 - 6.0 %    Basophils % 0.5 0.0 - 2.0 %    Neutrophils Absolute 5.92 1.20 - 7.70 x10*3/uL    Immature Granulocytes Absolute, Automated 0.01 0.00 - 0.70 x10*3/uL    Lymphocytes Absolute 1.52 1.20 - 4.80 x10*3/uL    Monocytes Absolute 0.58 0.10 - 1.00 x10*3/uL    Eosinophils Absolute 0.03 0.00 - 0.70 x10*3/uL    Basophils Absolute 0.04 0.00 - 0.10 x10*3/uL   Comprehensive metabolic panel   Result Value Ref Range    Glucose 99 74 - 99 mg/dL    Sodium 138 136 - 145 mmol/L    Potassium 4.4 3.5 - 5.3 mmol/L    Chloride 104 98 - 107 mmol/L    Bicarbonate 25 21 - 32 mmol/L    Anion Gap 13 10 - 20 mmol/L    Urea Nitrogen 8 6 - 23 mg/dL    Creatinine 0.47 (L) 0.50 - 1.05 mg/dL    eGFR >90 >60 mL/min/1.73m*2    Calcium 9.2 8.6 - 10.3 mg/dL    Albumin 4.0 3.4 - 5.0 g/dL    Alkaline Phosphatase 35 33 - 110 U/L    Total Protein 7.2 6.4 - 8.2 g/dL    AST 17 9 - 39 U/L    Bilirubin, Total 0.5 0.0 - 1.2 mg/dL    ALT 12 7 - 45 U/L   Magnesium   Result Value Ref Range    Magnesium 2.14 1.60 - 2.40 mg/dL   Troponin I, High Sensitivity   Result Value Ref Range    Troponin I, High Sensitivity 4 0 - 13 ng/L   Protime-INR   Result Value Ref Range    Protime 12.8 9.8 - 12.8 seconds    INR 1.1 0.9 - 1.1   aPTT   Result Value Ref Range    aPTT 35 27 - 38 seconds   Type and Screen   Result Value Ref Range    ABO TYPE A     Rh  TYPE NEG     ANTIBODY SCREEN NEG    ECG 12 lead   Result Value Ref Range    Systolic blood pressure 118 mmHg    Diastolic blood pressure 73 mmHg    Ventricular Rate 114 BPM    Atrial Rate 114 BPM    NY Interval 130 ms    QRS Duration 60 ms    QT Interval 314 ms    QTC Calculation(Bazett) 432 ms    P Axis 62 degrees    R Axis 36 degrees    T Axis 74 degrees    QRS Count 19 beats    Q Onset 222 ms    P Onset 157 ms    P Offset 203 ms    T Offset 379 ms    QTC Fredericia 388 ms   Electrocardiogram, 12-lead PRN ACS symptoms   Result Value Ref Range    Ventricular Rate 125 BPM    Atrial Rate 125 BPM    NY Interval 126 ms    QRS Duration 60 ms    QT Interval 310 ms    QTC Calculation(Bazett) 447 ms    P Axis 56 degrees    R Axis 19 degrees    T Axis 77 degrees    QRS Count 21 beats    Q Onset 221 ms    P Onset 158 ms    P Offset 203 ms    T Offset 376 ms    QTC Fredericia 395 ms   Blood Culture    Specimen: Peripheral Venipuncture; Blood culture   Result Value Ref Range    Blood Culture No growth at 1 day    Blood Culture    Specimen: Peripheral Venipuncture; Blood culture   Result Value Ref Range    Blood Culture No growth at 1 day    POCT GLUCOSE   Result Value Ref Range    POCT Glucose 119 (H) 74 - 99 mg/dL   Urinalysis with Reflex Microscopic   Result Value Ref Range    Color, Urine Yellow Straw, Yellow    Appearance, Urine Clear Clear    Specific Gravity, Urine 1.015 1.005 - 1.035    pH, Urine 6.0 5.0, 5.5, 6.0, 6.5, 7.0, 7.5, 8.0    Protein, Urine NEGATIVE NEGATIVE mg/dL    Glucose, Urine NEGATIVE NEGATIVE mg/dL    Blood, Urine NEGATIVE NEGATIVE    Ketones, Urine 20 (1+) (A) NEGATIVE mg/dL    Bilirubin, Urine NEGATIVE NEGATIVE    Urobilinogen, Urine <2.0 <2.0 mg/dL    Nitrite, Urine NEGATIVE NEGATIVE    Leukocyte Esterase, Urine NEGATIVE NEGATIVE   Blood Gas Arterial   Result Value Ref Range    POCT pH, Arterial 7.47 (H) 7.38 - 7.42 pH    POCT pCO2, Arterial 27 (L) 38 - 42 mm Hg    POCT pO2, Arterial 83 (L) 85 - 95  mm Hg    POCT SO2, Arterial 99 94 - 100 %    POCT Oxy Hemoglobin, Arterial 96.1 94.0 - 98.0 %    POCT Base Excess, Arterial -2.9 (L) -2.0 - 3.0 mmol/L    POCT HCO3 Calculated, Arterial 19.7 (L) 22.0 - 26.0 mmol/L    Patient Temperature      FiO2 21 %    Apparatus      Site of Arterial Puncture Arterial Line    Blood Gas Arterial Full Panel   Result Value Ref Range    POCT pH, Arterial 7.47 (H) 7.38 - 7.42 pH    POCT pCO2, Arterial 27 (L) 38 - 42 mm Hg    POCT pO2, Arterial 83 (L) 85 - 95 mm Hg    POCT SO2, Arterial 99 94 - 100 %    POCT Oxy Hemoglobin, Arterial 96.1 94.0 - 98.0 %    POCT Hematocrit Calculated, Arterial 33.0 (L) 36.0 - 46.0 %    POCT Sodium, Arterial 136 136 - 145 mmol/L    POCT Potassium, Arterial 3.4 (L) 3.5 - 5.3 mmol/L    POCT Chloride, Arterial 106 98 - 107 mmol/L    POCT Ionized Calcium, Arterial 1.16 1.10 - 1.33 mmol/L    POCT Glucose, Arterial 121 (H) 74 - 99 mg/dL    POCT Lactate, Arterial 1.3 0.4 - 2.0 mmol/L    POCT Base Excess, Arterial -2.9 (L) -2.0 - 3.0 mmol/L    POCT HCO3 Calculated, Arterial 19.7 (L) 22.0 - 26.0 mmol/L    POCT Hemoglobin, Arterial 10.9 (L) 12.0 - 16.0 g/dL    POCT Anion Gap, Arterial 14 10 - 25 mmo/L    Patient Temperature 37.0 degrees Celsius    FiO2 21 %    Apparatus      Site of Arterial Puncture Arterial Line    CBC   Result Value Ref Range    WBC 7.4 4.4 - 11.3 x10*3/uL    nRBC 0.0 0.0 - 0.0 /100 WBCs    RBC 3.74 (L) 4.00 - 5.20 x10*6/uL    Hemoglobin 10.9 (L) 12.0 - 16.0 g/dL    Hematocrit 33.4 (L) 36.0 - 46.0 %    MCV 89 80 - 100 fL    MCH 29.1 26.0 - 34.0 pg    MCHC 32.6 32.0 - 36.0 g/dL    RDW 14.0 11.5 - 14.5 %    Platelets 244 150 - 450 x10*3/uL   Comprehensive Metabolic Panel   Result Value Ref Range    Glucose 134 (H) 74 - 99 mg/dL    Sodium 138 136 - 145 mmol/L    Potassium 3.7 3.5 - 5.3 mmol/L    Chloride 108 (H) 98 - 107 mmol/L    Bicarbonate 21 21 - 32 mmol/L    Anion Gap 13 10 - 20 mmol/L    Urea Nitrogen 5 (L) 6 - 23 mg/dL    Creatinine 0.29 (L)  0.50 - 1.05 mg/dL    eGFR >90 >60 mL/min/1.73m*2    Calcium 8.9 8.6 - 10.3 mg/dL    Albumin 3.8 3.4 - 5.0 g/dL    Alkaline Phosphatase 39 33 - 110 U/L    Total Protein 6.6 6.4 - 8.2 g/dL    AST 65 (H) 9 - 39 U/L    Bilirubin, Total 0.8 0.0 - 1.2 mg/dL    ALT 40 7 - 45 U/L   Lactate   Result Value Ref Range    Lactate 1.1 0.4 - 2.0 mmol/L   Prepare RBC: 4 Units   Result Value Ref Range    PRODUCT CODE N9035P43     Unit Number [id number]     Unit ABO A     Unit RH NEG     XM INTEP COMP     Dispense Status XM     Blood Expiration Date May 20, 2024 23:59 EDT     PRODUCT BLOOD TYPE 0600     UNIT VOLUME 350     PRODUCT CODE E8403G85     Unit Number [id number]     Unit ABO A     Unit RH NEG     XM INTEP COMP     Dispense Status RE     Blood Expiration Date May 28, 2024 23:59 EDT     PRODUCT BLOOD TYPE 0600     UNIT VOLUME 284     PRODUCT CODE H2599P28     Unit Number [id number]     Unit ABO A     Unit RH NEG     XM INTEP COMP     Dispense Status RE     Blood Expiration Date May 28, 2024 23:59 EDT     PRODUCT BLOOD TYPE 0600     UNIT VOLUME 281     PRODUCT CODE K8587W31     Unit Number [id number]     Unit ABO O     Unit RH NEG     XM INTEP COMP     Dispense Status XM     Blood Expiration Date June 06, 2024 23:59 EDT     PRODUCT BLOOD TYPE 9500     UNIT VOLUME 350    VERIFY ABO/Rh Group Test   Result Value Ref Range    ABO TYPE A     Rh TYPE NEG    Sterile Fluid Culture/Smear    Specimen: PERICARDIAL FINE NEEDLE ASPIRATION; Fluid   Result Value Ref Range    Gram Stain No polymorphonuclear leukocytes seen     Gram Stain No organisms seen    Fibrinogen   Result Value Ref Range    Fibrinogen 410 (H) 200 - 400 mg/dL   Magnesium   Result Value Ref Range    Magnesium 1.80 1.60 - 2.40 mg/dL   Platelet Count   Result Value Ref Range    Platelets 227 150 - 450 x10*3/uL   Protime-INR   Result Value Ref Range    Protime 14.7 (H) 9.8 - 12.8 seconds    INR 1.3 (H) 0.9 - 1.1   aPTT   Result Value Ref Range    aPTT 31  27 - 38 seconds   POCT GLUCOSE   Result Value Ref Range    POCT Glucose 189 (H) 74 - 99 mg/dL   BLOOD GAS ARTERIAL FULL PANEL   Result Value Ref Range    POCT pH, Arterial 7.31 (L) 7.38 - 7.42 pH    POCT pCO2, Arterial 37 (L) 38 - 42 mm Hg    POCT pO2, Arterial 78 (L) 85 - 95 mm Hg    POCT SO2, Arterial 97 94 - 100 %    POCT Oxy Hemoglobin, Arterial 94.1 94.0 - 98.0 %    POCT Hematocrit Calculated, Arterial 36.0 36.0 - 46.0 %    POCT Sodium, Arterial 137 136 - 145 mmol/L    POCT Potassium, Arterial 3.7 3.5 - 5.3 mmol/L    POCT Chloride, Arterial 106 98 - 107 mmol/L    POCT Ionized Calcium, Arterial 1.15 1.10 - 1.33 mmol/L    POCT Glucose, Arterial 219 (H) 74 - 99 mg/dL    POCT Lactate, Arterial 1.5 0.4 - 2.0 mmol/L    POCT Base Excess, Arterial -7.0 (L) -2.0 - 3.0 mmol/L    POCT HCO3 Calculated, Arterial 18.6 (L) 22.0 - 26.0 mmol/L    POCT Hemoglobin, Arterial 12.1 12.0 - 16.0 g/dL    POCT Anion Gap, Arterial 16 10 - 25 mmo/L    Patient Temperature 37.0 degrees Celsius    FiO2 40 %    Site of Arterial Puncture Arterial Line    Calcium, Ionized   Result Value Ref Range    POCT Calcium, Ionized 1.17 1.1 - 1.33 mmol/L   Magnesium   Result Value Ref Range    Magnesium 1.74 1.60 - 2.40 mg/dL   Coagulation Screen   Result Value Ref Range    Protime 14.4 (H) 9.8 - 12.8 seconds    INR 1.3 (H) 0.9 - 1.1    aPTT 29 27 - 38 seconds   Fibrinogen   Result Value Ref Range    Fibrinogen 485 (H) 200 - 400 mg/dL   CBC   Result Value Ref Range    WBC 6.5 4.4 - 11.3 x10*3/uL    nRBC 0.0 0.0 - 0.0 /100 WBCs    RBC 4.04 4.00 - 5.20 x10*6/uL    Hemoglobin 11.9 (L) 12.0 - 16.0 g/dL    Hematocrit 36.0 36.0 - 46.0 %    MCV 89 80 - 100 fL    MCH 29.5 26.0 - 34.0 pg    MCHC 33.1 32.0 - 36.0 g/dL    RDW 13.7 11.5 - 14.5 %    Platelets 237 150 - 450 x10*3/uL   Renal Function Panel   Result Value Ref Range    Glucose 214 (H) 74 - 99 mg/dL    Sodium 138 136 - 145 mmol/L    Potassium 3.7 3.5 - 5.3 mmol/L    Chloride 107 98 - 107 mmol/L     Bicarbonate 20 (L) 21 - 32 mmol/L    Anion Gap 15 10 - 20 mmol/L    Urea Nitrogen 6 6 - 23 mg/dL    Creatinine 0.60 0.50 - 1.05 mg/dL    eGFR >90 >60 mL/min/1.73m*2    Calcium 8.3 (L) 8.6 - 10.3 mg/dL    Phosphorus 3.5 2.5 - 4.9 mg/dL    Albumin 3.7 3.4 - 5.0 g/dL   POCT GLUCOSE   Result Value Ref Range    POCT Glucose 180 (H) 74 - 99 mg/dL   POCT GLUCOSE   Result Value Ref Range    POCT Glucose 112 (H) 74 - 99 mg/dL   POCT GLUCOSE   Result Value Ref Range    POCT Glucose 124 (H) 74 - 99 mg/dL   Calcium, Ionized   Result Value Ref Range    POCT Calcium, Ionized 1.22 1.1 - 1.33 mmol/L   Magnesium   Result Value Ref Range    Magnesium 2.02 1.60 - 2.40 mg/dL   CBC   Result Value Ref Range    WBC 9.9 4.4 - 11.3 x10*3/uL    nRBC 0.0 0.0 - 0.0 /100 WBCs    RBC 3.76 (L) 4.00 - 5.20 x10*6/uL    Hemoglobin 11.0 (L) 12.0 - 16.0 g/dL    Hematocrit 33.5 (L) 36.0 - 46.0 %    MCV 89 80 - 100 fL    MCH 29.3 26.0 - 34.0 pg    MCHC 32.8 32.0 - 36.0 g/dL    RDW 13.8 11.5 - 14.5 %    Platelets 244 150 - 450 x10*3/uL   Renal Function Panel   Result Value Ref Range    Glucose 113 (H) 74 - 99 mg/dL    Sodium 138 136 - 145 mmol/L    Potassium 4.4 3.5 - 5.3 mmol/L    Chloride 107 98 - 107 mmol/L    Bicarbonate 24 21 - 32 mmol/L    Anion Gap 11 10 - 20 mmol/L    Urea Nitrogen 7 6 - 23 mg/dL    Creatinine 0.57 0.50 - 1.05 mg/dL    eGFR >90 >60 mL/min/1.73m*2    Calcium 8.8 8.6 - 10.3 mg/dL    Phosphorus 3.2 2.5 - 4.9 mg/dL    Albumin 3.4 3.4 - 5.0 g/dL     Transesophageal Echo (DIAN)    Result Date: 2024  DEFAULT Adult_Echo   Pt. Name: MINNIE OLIVER                      Pt. : 1970         ID: [id number]                       Pt. Age: 53 years Study Type: TRANSESOPHAGEAL ECHO (DIAN)   Exam Date: 2024 Ref. Phys.: [id number] BOB STRICKLAND    Review Date: 2024  Diag Phys: [id number] Bob Strickland MD      Modality: US  Indications: History: Pericardial window Diagnosis: Malignant pericardial effusion in diseases classified  elsewhere-I31.31 Impression: Large pericardial effusion with signs of tamponade. Superior vena caval and right atrial collapse. No wall motion abnormalities. No significant valvular abnormalities. 1 plus MR/TR Post pericardial window with resolution of signs of tamponade. Total fluid removed @1ltr. Summary: Successful pericardial window ** Final **     XR chest 1 view    Result Date: 5/2/2024  Interpreted By:  Bob Gomez, STUDY: XR CHEST 1 VIEW; ;  5/2/2024 2:03 pm   INDICATION: Signs/Symptoms:Post op cardiac surgery.   COMPARISON: 05/01/2024   ACCESSION NUMBER(S): [id number]   ORDERING CLINICIAN: ANDREA CRUMP   TECHNIQUE: A portable radiograph of the chest is performed.   FINDINGS: There is persistent low lung volumes. There is persistent cardiomegaly and mild vascular congestion with cephalization of the pulmonary vessels. There is increasing linear atelectasis in the left lung base. There is slight blunting left lateral costophrenic angle. There is increasing consolidation and atelectasis in the right mid lung abutting the right hilum. There is no pneumothorax. The osseous structures are unchanged. Prominent air-filled bowel loops overlie the upper abdomen.       Increasing consolidation and atelectasis in the right mid lung abutting the right hilum. Continued follow-up with PA and lateral examination is recommended as tolerated by the patient.   There is linear atelectasis in the left lung base.   Persistent cardiomegaly and mild pulmonary vascular congestion.     MACRO: None   Signed by: Bob Gomez 5/2/2024 2:57 PM Dictation workstation:   [id number]    Electrocardiogram, 12-lead PRN ACS symptoms    Result Date: 5/2/2024  Sinus tachycardia Low voltage QRS Nonspecific T wave abnormality Abnormal ECG When compared with ECG of 07-MAR-2024 12:49, PREVIOUS ECG IS PRESENT    ECG 12 lead    Result Date: 5/2/2024  Sinus tachycardia Low voltage QRS Nonspecific T wave abnormality Abnormal ECG No  previous ECGs available    Transthoracic Echo (TTE) Complete    Result Date: 5/2/2024    [facility], [address] [facility] [id number] [phone] and                                 Fax [phone] TRANSTHORACIC ECHOCARDIOGRAM REPORT  Patient Name:      MINNIE OLIVER            Reading Physician:    [id number] Harpreet Carl DO Study Date:        5/2/2024             Ordering Provider:    [id number] JUSTIN CASAREZ MRN/PID:           [id number]             Fellow: Accession#:        [id number]         Nurse: Date of Birth/Age: 1970 / 53 years Sonographer:          Satish Frazier ACS,                                                               RDCS, SUE Gender:            F                    Additional Staff: Height:            160.02 cm            Admit Date:           5/1/2024 Weight:            71.67 kg             Admission Status:     Inpatient - STAT BSA / BMI:         1.75 m2 / 27.99      Encounter#:           [id number]                    kg/m2                                         Department Location:  [address]                                                               [facility] Blood Pressure: 113 /76 mmHg Study Type:    TRANSTHORACIC ECHO (TTE) COMPLETE Diagnosis/ICD: Other pericardial effusion (noninflammatory)-I31.39 Indication:    Pericardial Effusion CPT Code:      Echo Limited-88889; Doppler Limited-07230 Patient History: Pertinent History: Pericardial effusion. Study Detail: The following Echo studies were performed: 2D and Doppler.               Technically challenging study due to patient lying in supine               position.  Critical Event Critical Event: Test was completed as per department protocol. Critical Finding: Cardiac tamponade. Time Test was Completed: 7:23:34 AM Notified: Dr. Carl. Attending notification time: 7:23:45 AM   PHYSICIAN INTERPRETATION: Left Ventricle: The left ventricular systolic function is normal, with an estimated ejection fraction of 55-60%. There are no regional wall motion abnormalities. The left ventricular cavity size is normal. Left ventricular diastolic filling was not assessed. Left Atrium: The left atrium was not assessed. Right Ventricle: The right ventricle was not assessed. Right ventricular systolic function not assessed. Right Atrium: The right atrium was not assessed. Aortic Valve: The aortic valve was not assessed. Aortic valve regurgitation was not assessed. Mitral Valve: The mitral valve was not assessed. Mitral valve regurgitation was not assessed. Tricuspid Valve: The tricuspid valve was not assessed. Tricuspid regurgitation was not assessed. Pulmonic Valve: The pulmonic valve was not assessed. Pulmonic valve regurgitation was not assessed. Pericardium: There is a large pericardial effusion. There is right ventricular diastolic collapse, is brief right atrial diastolic collapse and is inferior vena caval plethora. These findings are consistent with cardiac tamponade. Aorta: The aortic root was not assessed. Systemic Veins: The inferior vena cava appears mildly dilated.  CONCLUSIONS:  1. Left ventricular systolic function is normal with a 55-60% estimated ejection fraction.  2. Echocardiographic findings are consistent with cardiac tamponade.  3. There is a large pericardial effusion. QUANTITATIVE DATA SUMMARY: TRICUSPID VALVE/RVSP:                   Normal Ranges: IVC Diam: 2.40 cm  [id number] Harpreet Carl DO Electronically signed on 5/2/2024 at 7:48:49 AM  ** Final **     XR chest 2 views    Result Date: 5/1/2024  Interpreted By:  Leila Peralta, STUDY: Chest, 2 views.   INDICATION: Signs/Symptoms:shortness of breath.   COMPARISON: CT chest 03/07/2024   ACCESSION NUMBER(S): [id number]   ORDERING CLINICIAN: DIONNE DOMÍNGUEZ   FINDINGS: Cardiac silhouette size is mildly increased. Right hilar  pulmonary mass like opacity again noted and better evaluated on the previous CT study. There are prominent interstitial lung markings in the right upper lung zones suggesting pulmonary edema/lymphangitic carcinomatosis.   No pneumothorax. No new focal pulmonary consolidation.       1. Please see findings.   MACRO: None.   Signed by: Leila Peralta 5/1/2024 7:56 PM Dictation workstation:   [id number]    Transthoracic echo (TTE) limited    Result Date: 4/15/2024    [facility], [facility] [id number] [phone] and                                 Fax [phone] TRANSTHORACIC ECHOCARDIOGRAM REPORT  Patient Name:      MINNIE OLIVER            Reading Physician:    [id number] Desmond Grajeda MD, [facility] Study Date:        4/15/2024            Ordering Provider:    [id number] DESMOND GRAJEDA MRN/PID:           [id number]             Fellow: Accession#:        [id number]         Nurse: Date of Birth/Age: 1970 / 53 years Sonographer:          Racquel Whalen                                                               [facility] Gender:            F                    Additional Staff: Height:            160.02 cm            Admit Date: Weight:            65.77 kg             Admission Status: BSA / BMI:         1.69 m2 / 25.69      Encounter#:           [id number]                    kg/m2                                         [place] [facility]  [facility] Blood Pressure: 108 /60 mmHg Study Type:    TRANSTHORACIC ECHO (TTE) LIMITED Diagnosis/ICD: Other pericardial effusion (noninflammatory)-I31.39; Other long                term (current) drug therapy-Z79.899 Indication:    Pericardial effusion CPT Code:      Echo Limited-45328  Study Detail: The following Echo studies were performed: 2D.  PHYSICIAN INTERPRETATION: Left Ventricle: The left ventricular systolic function is normal,  with an estimated ejection fraction of 65%. There are no regional wall motion abnormalities. The left ventricular cavity size is normal. Left ventricular diastolic filling was not assessed. Left Atrium: The left atrium is normal in size. Right Ventricle: The right ventricle is normal in size. There is normal right ventricular global systolic function. Right Atrium: The right atrium is normal in size. Mild right atrial collapse. Aortic Valve: The aortic valve appears structurally normal. There is no evidence of aortic valve regurgitation. Mitral Valve: The mitral valve is normal in structure. There is no evidence of mitral valve regurgitation. Tricuspid Valve: The tricuspid valve is structurally normal. There is trace tricuspid regurgitation. The Doppler estimated RVSP is within normal limits at 16.1 mmHg. Pulmonic Valve: The pulmonic valve is not well visualized. The pulmonic valve regurgitation was not well visualized. Pericardium: There is a large pericardial effusion. Aorta: The aortic root is normal.  CONCLUSIONS:  1. Left ventricular systolic function is normal with a 65% estimated ejection fraction.  2. Mild right atrial collapse.  3. There is a large pericardial effusion.  4. RVSP within normal limits.  5. In comparisn to the previous study of 3/15/24, there has been an increase in size of the pericardial effusion. QUANTITATIVE DATA SUMMARY: TRICUSPID VALVE/RVSP:                             Normal Ranges: Peak TR Velocity: 1.81 m/s RV Syst Pressure: 16.1 mmHg (< 30mmHg)  [id number] Desmond Grajeda MD, [facility] Electronically signed on 4/15/2024 at 4:45:03 PM  ** Final **         Assessment/Plan   IMP:  Ayesha Nova is a 53 y.o. female with past medical history of breast cancer with metastasis to the lungs, and hypothyroidism, is presenting from her cardiologist's office with tachycardia.  The patient did endorse some chest tightness, and bedside ultrasound showed diastolic collapse with concern for cardiac tamponade; CTS  was consulted and recommended pericardial window.  The patient was admitted to the ICU with pericardial effusion and cardiac tamponade.  The patient did undergo VATS pericardial window with right chest tube placement on 5/2/2024.  Of note, the patient has refused treatment for her metastatic cancer at multiple times in the past 8 years, with her last visit to her oncologist several months ago; the patient told the ICU team that she wishes to have holistic treatment and care for her cancer.  Cardiology was consulted and noted suspicion that the pericardial effusion is malignant; pericardial fluid cytology is pending.  Palliative care was consulted to discuss goals of care and advance care planning.    5/3/2024-   The patient stated she is having occasional discomfort at the site of her chest tube, but stated this is well-managed by IV Dilaudid, and otherwise denied symptoms of discomfort, and so no additional medications will be recommended per our service at this time.  The patient stated her last bowel movement was 2 days ago, prior to admission; we will monitor for symptoms of constipation and bowel regimen adjustment needs.    I introduced the practice of palliative care and the services it provides.  I then reviewed at length with the patient the clinical diagnosis/medical problems that the patient presented with and the treatments provided so far. We discussed the implications of the current circumstances and option plans going forward.  We then reviewed at length the nature of the patient's primary disease- advanced stage metastatic cancer, its progression, and potential complications in the long run.  We did also discuss that her pericardial fluid cytology is still pending, and if this does prove to be malignant, the patient may have worsening fluid collection over time; the patient stated she is happy she underwent the pericardial window, understanding that she can have fluid drained as needed.  The patient  stated she did meet with Dr. Carolina several months ago and is considering initiating hormone therapy.  The patient stated she would not be interested in chemotherapy nor biologics, but is open to further information by Dr. Carolina about hormone therapy.  She stated she understands that her advanced stage cancer will not improve, but has plans to continue a largely holistic approach to cancer treatment.  I did recommend having outpatient palliative care follow for ongoing goals of care discussions and symptom management as needed, and the patient stated she would be open to this plan of care.  We did discuss that palliative care and oncologic therapies do often work together to promote quality of life while undergoing treatment for cancer.  I answered the patient's questions and concerns regarding palliative care to the best of my ability, and answered her questions and concerns regarding her underlying metastatic cancer and recent pericardial window to the best of my ability.  We did discuss CODE STATUS, and the patient stated she has had this discussion recently and would like to remain a full code.  She stated she is otherwise healthy, and would like to continue to live, as she experiences full quality of life at this time.  The patient will remain a full code.  The patient does seem to have solid understanding of her oncologic diagnoses and plans to continue to follow-up with Dr. Carolina.  The patient does seem outwardly well and has full functional ability.  She is optimistic.  Palliative care will continue to follow.      Thank you for allowing us to participate in the care of this lady.  Should you have any further questions or concerns regarding her care, please do not hesitate to contact us via [employer] (Kaykay Everett during weekdays work hours and Darryl Sethi during weekdays after hours and over the weekend)    (This note was generated with voice recognition software and may contain errors including  spelling, grammar, syntax and misrecognition of what was dictated, that are not fully corrected.)      Patient/proxy preference for information  Prefers full information    Goals of Care  The patient will remain a full code.  Her goal is continued combined curative and holistic measures.  The patient stated she is open to outpatient palliative care.      I spent 60 minutes in the professional and overall care of this patient.      Kaykay Everett, APRN-CNP

## 2024-05-03 NOTE — PROGRESS NOTES
SW spoke briefly with patient to provide list of palliative care list to them.  Patient to review.  SW will generate referral as needed.    TAWNY GORDON LCSW

## 2024-05-03 NOTE — PROGRESS NOTES
Cardiac Surgery  Progress Note     Ayesha Nova is a 53 y.o. female on day 2 of admission presenting with Pericardial effusion with cardiac tamponade ([facility]-HCC).    Subjective     Interval HPI: Seen and assessed patient this AM while they were laying in bed; endorsing the usual post-operative pain however in no acute distress.     Review of Systems   Constitutional: Positive for malaise/fatigue. Negative for decreased appetite.   HENT:  Negative for congestion.    Eyes:  Negative for blurred vision and double vision.   Cardiovascular:  Negative for chest pain, dyspnea on exertion, irregular heartbeat, leg swelling, orthopnea and palpitations.   Respiratory:  Positive for cough. Negative for shortness of breath.    Endocrine: Negative for cold intolerance and heat intolerance.   Skin:  Negative for nail changes, poor wound healing and rash.   Musculoskeletal:  Negative for arthritis, muscle cramps, muscle weakness, myalgias and stiffness.   Gastrointestinal:  Negative for bloating, nausea and vomiting.   Genitourinary:  Negative for frequency, hesitancy and urgency.   Neurological:  Negative for dizziness, focal weakness, light-headedness and weakness.   Psychiatric/Behavioral:  Negative for altered mental status.    Allergic/Immunologic: Negative for environmental allergies.         Objective   Physical Exam  Physical Exam  Constitutional:       General: She is not in acute distress.     Appearance: Normal appearance. She is not ill-appearing.   HENT:      Head: Normocephalic and atraumatic.      Nose: Nose normal.      Mouth/Throat:      Mouth: Mucous membranes are moist.      Pharynx: Oropharynx is clear.   Eyes:      Extraocular Movements: Extraocular movements intact.      Conjunctiva/sclera: Conjunctivae normal.      Pupils: Pupils are equal, round, and reactive to light.   Cardiovascular:      Rate and Rhythm: Normal rate and regular rhythm.      Pulses: Normal pulses.      Heart sounds: Normal heart sounds, S1  normal and S2 normal. No murmur heard.     No systolic murmur is present.      No friction rub. No gallop.   Pulmonary:      Effort: Pulmonary effort is normal.      Breath sounds: Normal breath sounds.   Abdominal:      General: Abdomen is flat. Bowel sounds are normal. There is no distension.      Palpations: Abdomen is soft.   Musculoskeletal:         General: Normal range of motion.      Cervical back: Normal range of motion and neck supple.      Right lower leg: No edema.      Left lower leg: No edema.   Skin:     General: Skin is warm and dry.      Capillary Refill: Capillary refill takes less than 2 seconds.      Findings: No lesion.      Nails: There is no clubbing.      Comments: Chest wall incisions C/D/I   Neurological:      General: No focal deficit present.      Mental Status: She is alert and oriented to person, place, and time. Mental status is at baseline.      Cranial Nerves: Cranial nerves 2-12 are intact.      Sensory: Sensation is intact.      Motor: Motor function is intact.   Psychiatric:         Attention and Perception: Attention and perception normal.         Mood and Affect: Mood normal.         Speech: Speech normal.         Behavior: Behavior normal.         Thought Content: Thought content normal.         Cognition and Memory: Cognition and memory normal.         Judgment: Judgment normal.         Last Recorded Vitals  Vitals:    05/03/24 1500 05/03/24 1600 05/03/24 1700 05/03/24 1800   BP: 124/64 99/59 110/76 97/53   BP Location:  Left arm  Left arm   Pulse: 95 83 96 89   Resp: 23 16 16 16   Temp:  36.1 °C (97 °F)     TempSrc:  Temporal     SpO2: 95% 93% 95% 95%   Weight:       Height:            Intake/Output last 3 Shifts:  I/O last 3 completed shifts:  In: 2550 (37.5 mL/kg) [P.O.:750; IV Piggyback:1800]  Out: 4280 (63 mL/kg) [Urine:2950 (1.2 mL/kg/hr); Emesis/NG output:50; Other:1000; Blood:10; Chest Tube:270]  Weight: 67.9 kg     Inpatient Medications  acetaminophen, 650 mg, oral,  q6h  ceFAZolin, 2 g, intravenous, q8h  dexAMETHasone, 4 mg, intravenous, q8h  enoxaparin, 40 mg, subcutaneous, Daily  famotidine, 20 mg, intravenous, q12h CHRIS  ipratropium-albuteroL, 3 mL, nebulization, q6h  lidocaine, 1 patch, transdermal, q24h  sennosides-docusate sodium, 2 tablet, oral, BID  thyroid (pork), 30 mg, oral, Daily      Continuous medications  lactated Ringer's, 5 mL/hr, Last Rate: 5 mL/hr (05/02/24 1113)      PRN medications  PRN medications: albuterol, dextrose, dextrose, glucagon, glucagon, HYDROmorphone, HYDROmorphone, naloxone, ondansetron     LDA:       Relevant Results  Lab Review  Results from last 7 days   Lab Units 05/03/24  0448   WBC AUTO x10*3/uL 9.9   HEMOGLOBIN g/dL 11.0*   HEMATOCRIT % 33.5*   PLATELETS AUTO x10*3/uL 244     Results from last 7 days   Lab Units 05/03/24  0448 05/02/24  1126 05/02/24  0537   SODIUM mmol/L 138   < > 138   POTASSIUM mmol/L 4.4   < > 3.7   CHLORIDE mmol/L 107   < > 108*   CO2 mmol/L 24   < > 21   BUN mg/dL 7   < > 5*   CREATININE mg/dL 0.57   < > 0.29*   CALCIUM mg/dL 8.8   < > 8.9   PROTEIN TOTAL g/dL  --   --  6.6   BILIRUBIN TOTAL mg/dL  --   --  0.8   ALK PHOS U/L  --   --  39   ALT U/L  --   --  40   AST U/L  --   --  65*   GLUCOSE mg/dL 113*   < > 134*    < > = values in this interval not displayed.     Results from last 7 days   Lab Units 05/03/24  0448   MAGNESIUM mg/dL 2.02     Results from last 7 days   Lab Units 05/03/24  0457   POCT PH, ARTERIAL pH 7.43*   POCT PCO2, ARTERIAL mm Hg 36*   POCT PO2, ARTERIAL mm Hg 76*   POCT HCO3 CALCULATED, ARTERIAL mmol/L 23.9   POCT BASE EXCESS, ARTERIAL mmol/L -0.2         Cardiology Tests     Echo:  Transesophageal Echo (DIAN) 05/02/2024  Impression: Large pericardial effusion with signs of tamponade.  Superior vena caval and right atrial collapse.  No wall motion abnormalities. No significant valvular abnormalities. 1 plus MR/TR  Post pericardial window with resolution of signs of tamponade.  Total fluid  removed @1ltr.     Summary:     Successful pericardial window    Transthoracic Echo (TTE) Complete 05/02/2024  PHYSICIAN INTERPRETATION:  Left Ventricle: The left ventricular systolic function is normal, with an estimated ejection fraction of 55-60%. There are no regional wall motion abnormalities. The left ventricular cavity size is normal. Left ventricular diastolic filling was not assessed.  Left Atrium: The left atrium was not assessed.  Right Ventricle: The right ventricle was not assessed. Right ventricular systolic function not assessed.  Right Atrium: The right atrium was not assessed.  Aortic Valve: The aortic valve was not assessed. Aortic valve regurgitation was not assessed.  Mitral Valve: The mitral valve was not assessed. Mitral valve regurgitation was not assessed.  Tricuspid Valve: The tricuspid valve was not assessed. Tricuspid regurgitation was not assessed.  Pulmonic Valve: The pulmonic valve was not assessed. Pulmonic valve regurgitation was not assessed.  Pericardium: There is a large pericardial effusion. There is right ventricular diastolic collapse, is brief right atrial diastolic collapse and is inferior vena caval plethora. These findings are consistent with cardiac tamponade.  Aorta: The aortic root was not assessed.  Systemic Veins: The inferior vena cava appears mildly dilated.        CONCLUSIONS:   1. Left ventricular systolic function is normal with a 55-60% estimated ejection fraction.   2. Echocardiographic findings are consistent with cardiac tamponade.   3. There is a large pericardial effusion.    Transthoracic echo (TTE) limited 04/15/2024  PHYSICIAN INTERPRETATION:  Left Ventricle: The left ventricular systolic function is normal, with an estimated ejection fraction of 65%. There are no regional wall motion abnormalities. The left ventricular cavity size is normal. Left ventricular diastolic filling was not assessed.  Left Atrium: The left atrium is normal in size.  Right  Ventricle: The right ventricle is normal in size. There is normal right ventricular global systolic function.  Right Atrium: The right atrium is normal in size. Mild right atrial collapse.  Aortic Valve: The aortic valve appears structurally normal. There is no evidence of aortic valve regurgitation.  Mitral Valve: The mitral valve is normal in structure. There is no evidence of mitral valve regurgitation.  Tricuspid Valve: The tricuspid valve is structurally normal. There is trace tricuspid regurgitation. The Doppler estimated RVSP is within normal limits at 16.1 mmHg.  Pulmonic Valve: The pulmonic valve is not well visualized. The pulmonic valve regurgitation was not well visualized.  Pericardium: There is a large pericardial effusion.  Aorta: The aortic root is normal.        CONCLUSIONS:   1. Left ventricular systolic function is normal with a 65% estimated ejection fraction.   2. Mild right atrial collapse.   3. There is a large pericardial effusion.   4. RVSP within normal limits.   5. In comparisn to the previous study of 3/15/24, there has been an increase in size of the pericardial effusion.    Onco-Echo Complete (Strain And 3D) 03/15/2024  PHYSICIAN INTERPRETATION:  Left Ventricle: The left ventricular systolic function is hyperdynamic, with an estimated ejection fraction of 65-70%. There are no regional wall motion abnormalities. The left ventricular cavity size is normal. Spectral Doppler shows a normal pattern of left ventricular diastolic filling.  Left Atrium: The left atrium is normal in size.  Right Ventricle: The right ventricle is normal in size. There is normal right ventricular global systolic function.  Right Atrium: The right atrium is normal in size.  Aortic Valve: The aortic valve appears structurally normal. There is no evidence of aortic valve regurgitation. The peak instantaneous gradient of the aortic valve is 3.8 mmHg. The mean gradient of the aortic valve is 2.2 mmHg.  Mitral Valve:  The mitral valve is normal in structure. There is no evidence of mitral valve regurgitation.  Tricuspid Valve: The tricuspid valve is structurally normal. No evidence of tricuspid regurgitation.  Pulmonic Valve: The pulmonic valve is structurally normal. There is no indication of pulmonic valve regurgitation.  Pericardium: There is a moderately sized pericardial effusion. The effusion is circumferential.  Aorta: The aortic root is normal.  Systemic Veins: The inferior vena cava was not well visualized.     ONCO-CARDIOLOGY:  Vital Signs: The patients heart rate during the study was 75 beats per minute.  The patients blood pressure was 102/56 during the study.  Machine: This study was performed on the [employer] E-9.  Previous Study: The patient had a previous Onco-Cardiology echocardiogram dated  5/31/2023. The patient's previous study was performed on the [employer] E-9.        Onco-Cardiology Measurements:  Historical Measurements from Previous Study  2D EF ([employer])                     63%  Global Longitudinal Strain (GLS) -22.3%     Current Measurements  2D EF ([employer]ipl[employer])                     64%  Global Longitudinal Strain (GLS) -20.6%     GLS Tracking Quality: Fair        CONCLUSIONS:   1. Left ventricular systolic function is hyperdynamic with a 65-70% estimated ejection fraction.   2. There is a moderate pericardial effusion.   3. The pericardial effusion is new in comparison to the study of 5/31/23.   4. Global longitudinal strain is normal at -20%.   5. No hemodynamic evidence of pericardial tamponade.    Ejection Fractions:  EF   Date/Time Value Ref Range Status   03/15/2024 12:43 PM 50 %             History of Present Illness: Ayesha Nova is a 53 y.o. female presenting with known pericardial effusion in the setting of stage IV metastatic breast cancer.  She called her cardiologist because of tachycardia and shortness of breath and he recommended she come to the emergency department.  She noted worsening shortness of breath  over the last week or so and today she was unable to walk in the house from the living room to the bathroom without becoming dyspneic.     On 5/1/24, the patient presented to [facility] and was fond to be with her ongoing pericardial effusion. She was evaluated to be with symptoms secondary to her effusion, and though a emergent pericardiocentesis was going to be performed, the patent's status temporized and she was able to be taken to the OR with cardiac surgeon, Dr. Saad Giles on 5/2/24 for a staged pericardial window.     Daily Events    5/3/2024: No acute events overnight; patient is with the usual post-operative pain however this is improving with multimodal management. She has had minimal output from her pleural chest tube and is endorsing pain at the sight, plan for removal today.      Assessment & Plan        Pericardial Effusion   - Secondary to malignancy given stage IV metastatic breast cancer   - S/p Urgent Pericardial Window on 5/2/24 with Cardiac Surgeon, Dr. Saad Giles  - Patient is reluctant to take medications, however could consider addition of colchicine given her pericardial effusion   - Chest tube with minimal output --> Plan to remove today    Above patient seen and plan discussed with Cardiac Surgeon, Dr. Saad Giles, plan as above. Will continue to follow along.     Jason Ellison, LEANDER-CNP

## 2024-05-04 ENCOUNTER — APPOINTMENT (OUTPATIENT)
Dept: RADIOLOGY | Facility: HOSPITAL | Age: 54
DRG: 598 | End: 2024-05-04
Payer: COMMERCIAL

## 2024-05-04 VITALS
SYSTOLIC BLOOD PRESSURE: 104 MMHG | WEIGHT: 147.4 LBS | OXYGEN SATURATION: 94 % | BODY MASS INDEX: 26.12 KG/M2 | RESPIRATION RATE: 21 BRPM | DIASTOLIC BLOOD PRESSURE: 68 MMHG | HEIGHT: 63 IN | TEMPERATURE: 97.3 F | HEART RATE: 82 BPM

## 2024-05-04 LAB
ALBUMIN SERPL BCP-MCNC: 3.2 G/DL (ref 3.4–5)
ANION GAP SERPL CALC-SCNC: 10 MMOL/L (ref 10–20)
ATRIAL RATE: 125 BPM
BUN SERPL-MCNC: 13 MG/DL (ref 6–23)
CA-I BLD-SCNC: 1.24 MMOL/L (ref 1.1–1.33)
CALCIUM SERPL-MCNC: 8.8 MG/DL (ref 8.6–10.3)
CHLORIDE SERPL-SCNC: 106 MMOL/L (ref 98–107)
CO2 SERPL-SCNC: 26 MMOL/L (ref 21–32)
CREAT SERPL-MCNC: 0.64 MG/DL (ref 0.5–1.05)
EGFRCR SERPLBLD CKD-EPI 2021: >90 ML/MIN/1.73M*2
ERYTHROCYTE [DISTWIDTH] IN BLOOD BY AUTOMATED COUNT: 13.8 % (ref 11.5–14.5)
GLUCOSE BLD MANUAL STRIP-MCNC: 117 MG/DL (ref 74–99)
GLUCOSE SERPL-MCNC: 125 MG/DL (ref 74–99)
HCT VFR BLD AUTO: 34.5 % (ref 36–46)
HGB BLD-MCNC: 11.3 G/DL (ref 12–16)
MAGNESIUM SERPL-MCNC: 2.13 MG/DL (ref 1.6–2.4)
MCH RBC QN AUTO: 29.2 PG (ref 26–34)
MCHC RBC AUTO-ENTMCNC: 32.8 G/DL (ref 32–36)
MCV RBC AUTO: 89 FL (ref 80–100)
NRBC BLD-RTO: 0 /100 WBCS (ref 0–0)
P AXIS: 56 DEGREES
P OFFSET: 203 MS
P ONSET: 158 MS
PHOSPHATE SERPL-MCNC: 2.6 MG/DL (ref 2.5–4.9)
PLATELET # BLD AUTO: 253 X10*3/UL (ref 150–450)
POTASSIUM SERPL-SCNC: 4.3 MMOL/L (ref 3.5–5.3)
PR INTERVAL: 126 MS
Q ONSET: 221 MS
QRS COUNT: 21 BEATS
QRS DURATION: 60 MS
QT INTERVAL: 310 MS
QTC CALCULATION(BAZETT): 447 MS
QTC FREDERICIA: 395 MS
R AXIS: 19 DEGREES
RBC # BLD AUTO: 3.87 X10*6/UL (ref 4–5.2)
SODIUM SERPL-SCNC: 138 MMOL/L (ref 136–145)
STAPHYLOCOCCUS SPEC CULT: NORMAL
T AXIS: 77 DEGREES
T OFFSET: 376 MS
VENTRICULAR RATE: 125 BPM
WBC # BLD AUTO: 9.8 X10*3/UL (ref 4.4–11.3)

## 2024-05-04 PROCEDURE — 71045 X-RAY EXAM CHEST 1 VIEW: CPT

## 2024-05-04 PROCEDURE — 99232 SBSQ HOSP IP/OBS MODERATE 35: CPT | Performed by: NURSE PRACTITIONER

## 2024-05-04 PROCEDURE — 80069 RENAL FUNCTION PANEL: CPT | Performed by: NURSE PRACTITIONER

## 2024-05-04 PROCEDURE — 85027 COMPLETE CBC AUTOMATED: CPT | Performed by: NURSE PRACTITIONER

## 2024-05-04 PROCEDURE — 99233 SBSQ HOSP IP/OBS HIGH 50: CPT | Performed by: STUDENT IN AN ORGANIZED HEALTH CARE EDUCATION/TRAINING PROGRAM

## 2024-05-04 PROCEDURE — 71045 X-RAY EXAM CHEST 1 VIEW: CPT | Performed by: RADIOLOGY

## 2024-05-04 PROCEDURE — 82330 ASSAY OF CALCIUM: CPT | Performed by: NURSE PRACTITIONER

## 2024-05-04 PROCEDURE — 83735 ASSAY OF MAGNESIUM: CPT | Performed by: NURSE PRACTITIONER

## 2024-05-04 PROCEDURE — 36415 COLL VENOUS BLD VENIPUNCTURE: CPT | Performed by: NURSE PRACTITIONER

## 2024-05-04 PROCEDURE — 82947 ASSAY GLUCOSE BLOOD QUANT: CPT

## 2024-05-04 ASSESSMENT — PAIN SCALES - GENERAL: PAINLEVEL_OUTOF10: 0 - NO PAIN

## 2024-05-04 ASSESSMENT — ENCOUNTER SYMPTOMS
MUSCLE CRAMPS: 0
FOCAL WEAKNESS: 0
DECREASED APPETITE: 0
FREQUENCY: 0
VOMITING: 0
COUGH: 1
POOR WOUND HEALING: 0
ORTHOPNEA: 0
BLURRED VISION: 0
DYSPNEA ON EXERTION: 0
MYALGIAS: 0
PALPITATIONS: 0
ALTERED MENTAL STATUS: 0
NAUSEA: 0
STIFFNESS: 0
DIZZINESS: 0
WEAKNESS: 0
BLOATING: 0
LIGHT-HEADEDNESS: 0
IRREGULAR HEARTBEAT: 0
SHORTNESS OF BREATH: 0
NAIL CHANGES: 0
DOUBLE VISION: 0

## 2024-05-04 ASSESSMENT — PAIN - FUNCTIONAL ASSESSMENT: PAIN_FUNCTIONAL_ASSESSMENT: 0-10

## 2024-05-04 NOTE — PROGRESS NOTES
Cardiac Surgery  Progress Note     Ayesha Nova is a 53 y.o. female on day 3 of admission presenting with Pericardial effusion with cardiac tamponade ([facility]-HCC).    Subjective     Interval HPI: Seen and assessed patient this AM while they were laying in bed; endorsing the usual post-operative pain however in no acute distress.     Review of Systems   Constitutional: Positive for malaise/fatigue. Negative for decreased appetite.   HENT:  Negative for congestion.    Eyes:  Negative for blurred vision and double vision.   Cardiovascular:  Negative for chest pain, dyspnea on exertion, irregular heartbeat, leg swelling, orthopnea and palpitations.   Respiratory:  Positive for cough. Negative for shortness of breath.    Endocrine: Negative for cold intolerance and heat intolerance.   Skin:  Negative for nail changes, poor wound healing and rash.   Musculoskeletal:  Negative for arthritis, muscle cramps, muscle weakness, myalgias and stiffness.   Gastrointestinal:  Negative for bloating, nausea and vomiting.   Genitourinary:  Negative for frequency, hesitancy and urgency.   Neurological:  Negative for dizziness, focal weakness, light-headedness and weakness.   Psychiatric/Behavioral:  Negative for altered mental status.    Allergic/Immunologic: Negative for environmental allergies.         Objective   Physical Exam  Physical Exam  Constitutional:       General: She is not in acute distress.     Appearance: Normal appearance. She is not ill-appearing.   HENT:      Head: Normocephalic and atraumatic.      Nose: Nose normal.      Mouth/Throat:      Mouth: Mucous membranes are moist.      Pharynx: Oropharynx is clear.   Eyes:      Extraocular Movements: Extraocular movements intact.      Conjunctiva/sclera: Conjunctivae normal.      Pupils: Pupils are equal, round, and reactive to light.   Cardiovascular:      Rate and Rhythm: Normal rate and regular rhythm.      Pulses: Normal pulses.      Heart sounds: Normal heart sounds, S1  normal and S2 normal. No murmur heard.     No systolic murmur is present.      No friction rub. No gallop.   Pulmonary:      Effort: Pulmonary effort is normal.      Breath sounds: Normal breath sounds.   Abdominal:      General: Abdomen is flat. Bowel sounds are normal. There is no distension.      Palpations: Abdomen is soft.   Musculoskeletal:         General: Normal range of motion.      Cervical back: Normal range of motion and neck supple.      Right lower leg: No edema.      Left lower leg: No edema.   Skin:     General: Skin is warm and dry.      Capillary Refill: Capillary refill takes less than 2 seconds.      Findings: No lesion.      Nails: There is no clubbing.      Comments: Chest wall incisions C/D/I   Neurological:      General: No focal deficit present.      Mental Status: She is alert and oriented to person, place, and time. Mental status is at baseline.      Cranial Nerves: Cranial nerves 2-12 are intact.      Sensory: Sensation is intact.      Motor: Motor function is intact.   Psychiatric:         Attention and Perception: Attention and perception normal.         Mood and Affect: Mood normal.         Speech: Speech normal.         Behavior: Behavior normal.         Thought Content: Thought content normal.         Cognition and Memory: Cognition and memory normal.         Judgment: Judgment normal.       Last Recorded Vitals  Vitals:    05/04/24 0600 05/04/24 0700 05/04/24 0800 05/04/24 0900   BP: 103/69 113/65 99/73 104/68   BP Location:       Pulse: 80 77 80 82   Resp: (!) 32 12 18 21   Temp:   36.3 °C (97.3 °F)    TempSrc:   Temporal    SpO2: 95% 94% 93% 94%   Weight:    66.9 kg (147 lb 6.4 oz)   Height:            Intake/Output last 3 Shifts:  I/O last 3 completed shifts:  In: 950 (14 mL/kg) [P.O.:750; IV Piggyback:200]  Out: 2560 (37.7 mL/kg) [Urine:2450 (1 mL/kg/hr); Chest Tube:110]  Weight: 67.9 kg     Inpatient Medications  acetaminophen, 650 mg, oral, q6h  enoxaparin, 40 mg,  subcutaneous, Daily  famotidine, 20 mg, intravenous, q12h CHRIS  ipratropium-albuteroL, 3 mL, nebulization, q6h  lidocaine, 1 patch, transdermal, q24h  sennosides-docusate sodium, 2 tablet, oral, BID  thyroid (pork), 30 mg, oral, Daily      Continuous medications  lactated Ringer's, 5 mL/hr, Last Rate: 5 mL/hr (05/02/24 1113)      PRN medications  PRN medications: albuterol, dextrose, dextrose, glucagon, glucagon, HYDROmorphone, HYDROmorphone, naloxone, ondansetron     LDA:       Relevant Results  Lab Review  Results from last 7 days   Lab Units 05/04/24  0517   WBC AUTO x10*3/uL 9.8   HEMOGLOBIN g/dL 11.3*   HEMATOCRIT % 34.5*   PLATELETS AUTO x10*3/uL 253     Results from last 7 days   Lab Units 05/04/24  0517 05/02/24  1126 05/02/24  0537   SODIUM mmol/L 138   < > 138   POTASSIUM mmol/L 4.3   < > 3.7   CHLORIDE mmol/L 106   < > 108*   CO2 mmol/L 26   < > 21   BUN mg/dL 13   < > 5*   CREATININE mg/dL 0.64   < > 0.29*   CALCIUM mg/dL 8.8   < > 8.9   PROTEIN TOTAL g/dL  --   --  6.6   BILIRUBIN TOTAL mg/dL  --   --  0.8   ALK PHOS U/L  --   --  39   ALT U/L  --   --  40   AST U/L  --   --  65*   GLUCOSE mg/dL 125*   < > 134*    < > = values in this interval not displayed.     Results from last 7 days   Lab Units 05/04/24  0517   MAGNESIUM mg/dL 2.13     Results from last 7 days   Lab Units 05/03/24  0457   POCT PH, ARTERIAL pH 7.43*   POCT PCO2, ARTERIAL mm Hg 36*   POCT PO2, ARTERIAL mm Hg 76*   POCT HCO3 CALCULATED, ARTERIAL mmol/L 23.9   POCT BASE EXCESS, ARTERIAL mmol/L -0.2         Cardiology Tests     Echo:  Transesophageal Echo (DIAN) 05/02/2024  Impression: Large pericardial effusion with signs of tamponade.  Superior vena caval and right atrial collapse.  No wall motion abnormalities. No significant valvular abnormalities. 1 plus MR/TR  Post pericardial window with resolution of signs of tamponade.  Total fluid removed @1ltr.     Summary:     Successful pericardial window    Transthoracic Echo (TTE) Complete  05/02/2024  PHYSICIAN INTERPRETATION:  Left Ventricle: The left ventricular systolic function is normal, with an estimated ejection fraction of 55-60%. There are no regional wall motion abnormalities. The left ventricular cavity size is normal. Left ventricular diastolic filling was not assessed.  Left Atrium: The left atrium was not assessed.  Right Ventricle: The right ventricle was not assessed. Right ventricular systolic function not assessed.  Right Atrium: The right atrium was not assessed.  Aortic Valve: The aortic valve was not assessed. Aortic valve regurgitation was not assessed.  Mitral Valve: The mitral valve was not assessed. Mitral valve regurgitation was not assessed.  Tricuspid Valve: The tricuspid valve was not assessed. Tricuspid regurgitation was not assessed.  Pulmonic Valve: The pulmonic valve was not assessed. Pulmonic valve regurgitation was not assessed.  Pericardium: There is a large pericardial effusion. There is right ventricular diastolic collapse, is brief right atrial diastolic collapse and is inferior vena caval plethora. These findings are consistent with cardiac tamponade.  Aorta: The aortic root was not assessed.  Systemic Veins: The inferior vena cava appears mildly dilated.        CONCLUSIONS:   1. Left ventricular systolic function is normal with a 55-60% estimated ejection fraction.   2. Echocardiographic findings are consistent with cardiac tamponade.   3. There is a large pericardial effusion.    Transthoracic echo (TTE) limited 04/15/2024  PHYSICIAN INTERPRETATION:  Left Ventricle: The left ventricular systolic function is normal, with an estimated ejection fraction of 65%. There are no regional wall motion abnormalities. The left ventricular cavity size is normal. Left ventricular diastolic filling was not assessed.  Left Atrium: The left atrium is normal in size.  Right Ventricle: The right ventricle is normal in size. There is normal right ventricular global systolic  function.  Right Atrium: The right atrium is normal in size. Mild right atrial collapse.  Aortic Valve: The aortic valve appears structurally normal. There is no evidence of aortic valve regurgitation.  Mitral Valve: The mitral valve is normal in structure. There is no evidence of mitral valve regurgitation.  Tricuspid Valve: The tricuspid valve is structurally normal. There is trace tricuspid regurgitation. The Doppler estimated RVSP is within normal limits at 16.1 mmHg.  Pulmonic Valve: The pulmonic valve is not well visualized. The pulmonic valve regurgitation was not well visualized.  Pericardium: There is a large pericardial effusion.  Aorta: The aortic root is normal.        CONCLUSIONS:   1. Left ventricular systolic function is normal with a 65% estimated ejection fraction.   2. Mild right atrial collapse.   3. There is a large pericardial effusion.   4. RVSP within normal limits.   5. In comparisn to the previous study of 3/15/24, there has been an increase in size of the pericardial effusion.    Onco-Echo Complete (Strain And 3D) 03/15/2024  PHYSICIAN INTERPRETATION:  Left Ventricle: The left ventricular systolic function is hyperdynamic, with an estimated ejection fraction of 65-70%. There are no regional wall motion abnormalities. The left ventricular cavity size is normal. Spectral Doppler shows a normal pattern of left ventricular diastolic filling.  Left Atrium: The left atrium is normal in size.  Right Ventricle: The right ventricle is normal in size. There is normal right ventricular global systolic function.  Right Atrium: The right atrium is normal in size.  Aortic Valve: The aortic valve appears structurally normal. There is no evidence of aortic valve regurgitation. The peak instantaneous gradient of the aortic valve is 3.8 mmHg. The mean gradient of the aortic valve is 2.2 mmHg.  Mitral Valve: The mitral valve is normal in structure. There is no evidence of mitral valve  regurgitation.  Tricuspid Valve: The tricuspid valve is structurally normal. No evidence of tricuspid regurgitation.  Pulmonic Valve: The pulmonic valve is structurally normal. There is no indication of pulmonic valve regurgitation.  Pericardium: There is a moderately sized pericardial effusion. The effusion is circumferential.  Aorta: The aortic root is normal.  Systemic Veins: The inferior vena cava was not well visualized.     ONCO-CARDIOLOGY:  Vital Signs: The patients heart rate during the study was 75 beats per minute.  The patients blood pressure was 102/56 during the study.  Machine: This study was performed on the [employer] E-9.  Previous Study: The patient had a previous Onco-Cardiology echocardiogram dated  5/31/2023. The patient's previous study was performed on the [employer] E-9.        Onco-Cardiology Measurements:  Historical Measurements from Previous Study  2D EF (Biplane)                     63%  Global Longitudinal Strain (GLS) -22.3%     Current Measurements  2D EF ([employer]iplane)                     64%  Global Longitudinal Strain (GLS) -20.6%     GLS Tracking Quality: Fair        CONCLUSIONS:   1. Left ventricular systolic function is hyperdynamic with a 65-70% estimated ejection fraction.   2. There is a moderate pericardial effusion.   3. The pericardial effusion is new in comparison to the study of 5/31/23.   4. Global longitudinal strain is normal at -20%.   5. No hemodynamic evidence of pericardial tamponade.    Ejection Fractions:  EF   Date/Time Value Ref Range Status   03/15/2024 12:43 PM 50 %             History of Present Illness: Ayesha Nova is a 53 y.o. female presenting with known pericardial effusion in the setting of stage IV metastatic breast cancer.  She called her cardiologist because of tachycardia and shortness of breath and he recommended she come to the emergency department.  She noted worsening shortness of breath over the last week or so and today she was unable to walk in the house from the  living room to the bathroom without becoming dyspneic.     On 5/1/24, the patient presented to [facility] and was fond to be with her ongoing pericardial effusion. She was evaluated to be with symptoms secondary to her effusion, and though a emergent pericardiocentesis was going to be performed, the patent's status temporized and she was able to be taken to the OR with cardiac surgeon, Dr. Saad Giles on 5/2/24 for a staged pericardial window.     Daily Events    5/3/2024: No acute events overnight; patient is with the usual post-operative pain however this is improving with multimodal management. She has had minimal output from her pleural chest tube and is endorsing pain at the sight, plan for removal today.      5/4/2024: Continues with no acute events overnight. CXR is without evidence of recurrent effusion. Given patient's quick recovery & stability, okay to discharge to home once medically okay per primary.     Assessment & Plan        Pericardial Effusion   - Secondary to malignancy given stage IV metastatic breast cancer   - S/p Urgent Pericardial Window on 5/2/24 with Cardiac Surgeon, Dr. Saad Giles  - CXR stable without evidence of pleural effusions   - Patient is reluctant to take medications, however could consider addition of colchicine given her pericardial effusion     Above patient seen and plan discussed with Cardiac Surgeon, Dr. Saad Giles, plan as above. Okay to discharge once medically okay. Will arrange for OP follow up.     LEANDER Ch-CNP

## 2024-05-04 NOTE — PROGRESS NOTES
"Cardiology Consult Progress Note:    Interval History:  - Interval resolution of chest pain after chest tube removal  - Remains hemodynamically stable; anticipate discharge home today with outpatient cardiology follow-up    Objective:     Inpatient Medications:  Scheduled medications   Medication Dose Route Frequency    acetaminophen  650 mg oral q6h    enoxaparin  40 mg subcutaneous Daily    famotidine  20 mg intravenous q12h CHRIS    ipratropium-albuteroL  3 mL nebulization q6h    lidocaine  1 patch transdermal q24h    sennosides-docusate sodium  2 tablet oral BID    thyroid (pork)  30 mg oral Daily       PRN medications   Medication    albuterol    dextrose    dextrose    glucagon    glucagon    HYDROmorphone    HYDROmorphone    naloxone    ondansetron       Continuous Medications   Medication Dose Last Rate    lactated Ringer's  5 mL/hr 5 mL/hr (05/02/24 1113)       Intake / Output Summary:     Intake/Output Summary (Last 24 hours) at 5/4/2024 1230  Last data filed at 5/4/2024 0800  Gross per 24 hour   Intake 450 ml   Output 1730 ml   Net -1280 ml       Net IO Since Admission: -764.58 mL [05/04/24 1230]    Physical Exam:  /68   Pulse 82   Temp 36.3 °C (97.3 °F) (Temporal)   Resp 21   Ht 1.6 m (5' 2.99\")   Wt 66.9 kg (147 lb 6.4 oz)   SpO2 94%   BMI 26.12 kg/m²     Physical Exam  Constitutional:       General: She is not in acute distress.  HENT:      Head: Normocephalic.      Mouth/Throat:      Mouth: Mucous membranes are moist.   Eyes:      Extraocular Movements: Extraocular movements intact.      Conjunctiva/sclera: Conjunctivae normal.   Neck:      Vascular: No carotid bruit or JVD.   Cardiovascular:      Rate and Rhythm: Normal rate and regular rhythm.      Pulses: Normal pulses.      Heart sounds: No murmur heard.  Pulmonary:      Effort: Pulmonary effort is normal. No respiratory distress.      Breath sounds: Normal breath sounds.   Abdominal:      General: Bowel sounds are normal. There is no " distension.      Palpations: Abdomen is soft.   Musculoskeletal:         General: No swelling.   Skin:     General: Skin is warm and dry.   Neurological:      General: No focal deficit present.      Mental Status: She is alert.      Cranial Nerves: No cranial nerve deficit.      Motor: No weakness.   Psychiatric:         Mood and Affect: Mood normal.         Behavior: Behavior normal.         Lab/Radiology/Diagnostic Review:    Labs  Results for orders placed or performed during the hospital encounter of 05/01/24 (from the past 24 hour(s))   POCT GLUCOSE   Result Value Ref Range    POCT Glucose 135 (H) 74 - 99 mg/dL   Calcium, Ionized   Result Value Ref Range    POCT Calcium, Ionized 1.24 1.1 - 1.33 mmol/L   Magnesium   Result Value Ref Range    Magnesium 2.13 1.60 - 2.40 mg/dL   CBC   Result Value Ref Range    WBC 9.8 4.4 - 11.3 x10*3/uL    nRBC 0.0 0.0 - 0.0 /100 WBCs    RBC 3.87 (L) 4.00 - 5.20 x10*6/uL    Hemoglobin 11.3 (L) 12.0 - 16.0 g/dL    Hematocrit 34.5 (L) 36.0 - 46.0 %    MCV 89 80 - 100 fL    MCH 29.2 26.0 - 34.0 pg    MCHC 32.8 32.0 - 36.0 g/dL    RDW 13.8 11.5 - 14.5 %    Platelets 253 150 - 450 x10*3/uL   Renal Function Panel   Result Value Ref Range    Glucose 125 (H) 74 - 99 mg/dL    Sodium 138 136 - 145 mmol/L    Potassium 4.3 3.5 - 5.3 mmol/L    Chloride 106 98 - 107 mmol/L    Bicarbonate 26 21 - 32 mmol/L    Anion Gap 10 10 - 20 mmol/L    Urea Nitrogen 13 6 - 23 mg/dL    Creatinine 0.64 0.50 - 1.05 mg/dL    eGFR >90 >60 mL/min/1.73m*2    Calcium 8.8 8.6 - 10.3 mg/dL    Phosphorus 2.6 2.5 - 4.9 mg/dL    Albumin 3.2 (L) 3.4 - 5.0 g/dL   POCT GLUCOSE   Result Value Ref Range    POCT Glucose 117 (H) 74 - 99 mg/dL       Peripheral IV 05/01/24 18 G Left Antecubital (Active)   Placement Date/Time: 05/01/24 1927   Size (Gauge): 18 G  Orientation: Left  Location: Antecubital  Site Prep: Chlorhexidine    Number of days: 1       Peripheral IV 05/01/24 18 G Right Antecubital (Active)   Placement Date/Time:  05/01/24 1928   Size (Gauge): 18 G  Orientation: Right  Location: Antecubital  Site Prep: Chlorhexidine    Number of days: 1       Chest Tube 1 Right Pleural 24 Fr (Active)   Placement Date/Time: 05/02/24 0943   Placed by: DR. PARKS  Hand Hygiene Completed: Yes  Tube Number: 1  Chest Tube Orientation: Right  Chest Tube Location: Pleural  Chest Tube Drain Tube Size (Fr): 24 Fr  Chest Tube Drainage System: Dry seal chest d...   Number of days: 1        Troponin I, High Sensitivity   Date/Time Value Ref Range Status   05/01/2024 07:24 PM 4 0 - 13 ng/L Final   03/07/2024 01:03 PM <3 0 - 13 ng/L Final     Troponin I   Date/Time Value Ref Range Status   11/13/2022 06:34 AM <3 0 - 13 ng/L Final     Comment:     .  Less than 99th percentile of normal range cutoff-  Female and children under 18 years old <14 ng/L; Male <21 ng/L: Negative  Repeat testing should be performed if clinically indicated.   .  Female and children under 18 years old 14-50 ng/L; Male 21-50 ng/L:  Consistent with possible cardiac damage and possible increased clinical   risk. Serial measurements may help to assess extent of myocardial damage.   .  >50 ng/L: Consistent with cardiac damage, increased clinical risk and  myocardial infarction. Serial measurements may help assess extent of   myocardial damage.   .   NOTE: Children less than 1 year old may have higher baseline troponin   levels and results should be interpreted in conjunction with the overall   clinical context.   .  NOTE: Troponin I testing is performed using a different   testing methodology at [facility] than at other   [facility] hospitals. Direct result comparisons should only   be made within the same method.       BNP   Date/Time Value Ref Range Status   05/01/2024 07:24 PM 21 0 - 99 pg/mL Final   03/07/2024 01:03 PM 35 0 - 99 pg/mL Final     VLDL   Date/Time Value Ref Range Status   08/23/2019 08:50 AM 13 0 - 40 mg/dL Final     Cardiac Imaging (personally  reviewed):    TTE (5/2/2024)  1. Left ventricular systolic function is normal with a 55-60% estimated ejection fraction.  2. Echocardiographic findings are consistent with cardiac tamponade.  3. There is a large pericardial effusion.    Assessment:   53 y.o.  female who presented with tachycardia and dyspnea in setting of pericardial effusion. Past medical history of metastatic breast cancer with pulmonary metastasis, pericardial effusion.       Given symptomatic pericardial effusion with possible tamponade patient underwent video-assisted thoracoscopic pericardial window with Dr. Giles. Cytology and cultures pending. Suspect malignant effusion given history of metastatic breast cancer.        Clinical course:  5/4/2024: Cytology and pathology pending.  Interval resolution of chest pain after chest tube removal.  Patient remains hemodynamic stable and will be discharged home with outpatient cardiology follow-up per primary team.     Overall Recommendations:  1.  Pericardial effusion  - Suspect malignant pericardial effusion  - Underwent video-assisted thorascopic pericardial window given symptomatic effusion; cardiac tamponade  - Await cytology and fluid analysis  - If patient continues to have sharp pleuritic chest pain would then consider trial of colchicine     2.  Sinus tachycardia (Improved)  - In the setting of pain; possible component of pericarditis; postoperative pain  - Treat underlying exacerbating factors     3.  History of metastatic breast cancer  - Management as per primary team and oncology     Thank you for the cardiology consult. Please contact with any additional questions     Chas Jorgensen MD

## 2024-05-04 NOTE — CARE PLAN
The clinical goals for the shift include pt to get adequate sleep, and remain hemodynamically stable throughout shift      Problem: Pain  Goal: My pain/discomfort is manageable  Outcome: Progressing     Problem: Daily Care  Goal: Daily care needs are met  Outcome: Progressing     Problem: Safety  Goal: Patient will be injury free during hospitalization  Outcome: Progressing  Goal: I will remain free of falls  Outcome: Progressing     Problem: Psychosocial Needs  Goal: Demonstrates ability to cope with hospitalization/illness  Outcome: Progressing  Goal: Collaborate with me, my family, and caregiver to identify my specific goals  Outcome: Progressing     Problem: Discharge Barriers  Goal: My discharge needs are met  Outcome: Progressing     Problem: Respiratory  Goal: Clear secretions with interventions this shift  Outcome: Progressing  Goal: Minimize anxiety/maximize coping throughout shift  Outcome: Progressing  Goal: No signs of respiratory distress (eg. Use of accessory muscles. Peds grunting)  Outcome: Progressing     Problem: Pain - Adult  Goal: Verbalizes/displays adequate comfort level or baseline comfort level  Outcome: Progressing     Problem: Safety - Adult  Goal: Free from fall injury  Outcome: Progressing     Problem: Discharge Planning  Goal: Discharge to home or other facility with appropriate resources  Outcome: Progressing     Problem: Chronic Conditions and Co-morbidities  Goal: Patient's chronic conditions and co-morbidity symptoms are monitored and maintained or improved  Outcome: Progressing

## 2024-05-04 NOTE — DISCHARGE SUMMARY
Discharge Diagnosis  Pericardial effusion with cardiac tamponade ([facility])    Discharge Meds     Your medication list        CONTINUE taking these medications        Instructions Last Dose Given Next Dose Due   NP Thyroid 30 mg tablet  Generic drug: thyroid (pork)                    Test Results Pending At Discharge  Pending Labs       Order Current Status    Cytology Consultation (Non-Gynecologic) In process    Staphylococcus Aureus/MRSA Colonization, Culture In process    Surgical Pathology Exam In process    Blood Culture Preliminary result    Blood Culture Preliminary result    Sterile Fluid Culture/Smear Preliminary result            Hospital Course   53-year-old with history of metastatic breast cancer (originally diagnosed in 2016, triple positive) to lung, hypothyroidism presented on 5/1 per advisement of her cardiologist, she was having tachycardia, chest tightness, shortness of breath, worsening fatigue for the last few weeks.  Found to have large pericardial effusion likely in the setting of metastatic cancer, status post paracardial window on 5/2.  Chest tube removed 5/3.  Patient with expected postop respiratory insufficiency and right middle lobe consolidation/atelectasis, improving with incentive spirometer and flutter valve.  Patient heavily counseled on following up with hematology/oncology at time of discharge.  Palliative care also spoke with the patient.  Cardiothoracic surgery has cleared the patient for discharge.  All questions addressed.  To follow-up with cardiology and cardiothoracic surgery after discharge as well.    Pertinent Physical Exam At Time of Discharge  Physical Exam  General: Sitting in chair comfortably  HEENT: Normocephalic, atraumatic, pupils equally reactive to light  Chest: Clear to auscultation bilaterally  Heart: Regular rate and rhythm  Abdomen: Soft, nontender, nondistended  Extremities: No swelling   Neuro: Alert oriented x 4.  No focal deficits identified.  Psych:  Appropriate    Outpatient Follow-Up  Future Appointments   Date Time Provider Department Center   5/6/2024 10:00 AM PAR MAC 3 ECHO ROOM 1 [id number] TAWANA Allred DO

## 2024-05-04 NOTE — PROGRESS NOTES
"This  returned to room to follow up re: outpatient palliative care choice. Patient stated that she doesn't feel like she wants outpatient palliative as she is doing fine and not \"on deaths door\". SW explained the role of palliative care and she stated she understood better now. She feels like she doesn't want it at this moment but will follow up if she changes her mind.  will continue to follow. Message with questions.  VERO Dotson  "

## 2024-05-05 LAB
BACTERIA FLD CULT: NORMAL
BLOOD EXPIRATION DATE: NORMAL
DISPENSE STATUS: NORMAL
GRAM STN SPEC: NORMAL
GRAM STN SPEC: NORMAL
PRODUCT BLOOD TYPE: 600
PRODUCT BLOOD TYPE: 9500
PRODUCT CODE: NORMAL
UNIT ABO: NORMAL
UNIT NUMBER: NORMAL
UNIT RH: NORMAL
UNIT VOLUME: 281
UNIT VOLUME: 284
UNIT VOLUME: 350
UNIT VOLUME: 350
XM INTEP: NORMAL

## 2024-05-06 ENCOUNTER — HOSPITAL ENCOUNTER (INPATIENT)
Dept: CARDIOLOGY | Facility: CLINIC | Age: 54
Discharge: HOME | DRG: 598 | End: 2024-05-06
Payer: COMMERCIAL

## 2024-05-06 ENCOUNTER — APPOINTMENT (OUTPATIENT)
Dept: CARDIOLOGY | Facility: CLINIC | Age: 54
End: 2024-05-06
Payer: COMMERCIAL

## 2024-05-06 DIAGNOSIS — I31.39 PERICARDIAL EFFUSION (HHS-HCC): ICD-10-CM

## 2024-05-06 DIAGNOSIS — Z98.890 S/P PERICARDIAL WINDOW CREATION: ICD-10-CM

## 2024-05-06 LAB
ATRIAL RATE: 107 BPM
ATRIAL RATE: 114 BPM
ATRIAL RATE: 84 BPM
BACTERIA BLD CULT: NORMAL
BACTERIA BLD CULT: NORMAL
DIASTOLIC BLOOD PRESSURE: 104 MMHG
DIASTOLIC BLOOD PRESSURE: 73 MMHG
DIASTOLIC BLOOD PRESSURE: 80 MMHG
P AXIS: 48 DEGREES
P AXIS: 48 DEGREES
P AXIS: 62 DEGREES
P OFFSET: 193 MS
P OFFSET: 198 MS
P OFFSET: 203 MS
P ONSET: 134 MS
P ONSET: 146 MS
P ONSET: 157 MS
PR INTERVAL: 130 MS
PR INTERVAL: 148 MS
PR INTERVAL: 170 MS
Q ONSET: 219 MS
Q ONSET: 220 MS
Q ONSET: 222 MS
QRS COUNT: 14 BEATS
QRS COUNT: 18 BEATS
QRS COUNT: 19 BEATS
QRS DURATION: 60 MS
QRS DURATION: 70 MS
QRS DURATION: 74 MS
QT INTERVAL: 314 MS
QT INTERVAL: 344 MS
QT INTERVAL: 392 MS
QTC CALCULATION(BAZETT): 432 MS
QTC CALCULATION(BAZETT): 459 MS
QTC CALCULATION(BAZETT): 463 MS
QTC FREDERICIA: 388 MS
QTC FREDERICIA: 417 MS
QTC FREDERICIA: 438 MS
R AXIS: 106 DEGREES
R AXIS: 19 DEGREES
R AXIS: 36 DEGREES
SYSTOLIC BLOOD PRESSURE: 103 MMHG
SYSTOLIC BLOOD PRESSURE: 116 MMHG
SYSTOLIC BLOOD PRESSURE: 118 MMHG
T AXIS: 32 DEGREES
T AXIS: 35 DEGREES
T AXIS: 74 DEGREES
T OFFSET: 379 MS
T OFFSET: 391 MS
T OFFSET: 416 MS
VENTRICULAR RATE: 107 BPM
VENTRICULAR RATE: 114 BPM
VENTRICULAR RATE: 84 BPM

## 2024-05-06 PROCEDURE — 93308 TTE F-UP OR LMTD: CPT

## 2024-05-06 PROCEDURE — 93308 TTE F-UP OR LMTD: CPT | Performed by: INTERNAL MEDICINE

## 2024-05-07 ENCOUNTER — TELEPHONE (OUTPATIENT)
Dept: CARDIOLOGY | Facility: CLINIC | Age: 54
End: 2024-05-07
Payer: COMMERCIAL

## 2024-05-07 LAB
AORTIC VALVE MEAN GRADIENT: 3.6 MMHG
AORTIC VALVE PEAK VELOCITY: 1.26 M/S
AV PEAK GRADIENT: 6.3 MMHG
EJECTION FRACTION APICAL 4 CHAMBER: 61.2
LEFT VENTRICLE INTERNAL DIMENSION DIASTOLE: 3.57 CM (ref 3.5–6)
LEFT VENTRICULAR OUTFLOW TRACT DIAMETER: 1.99 CM
LV EJECTION FRACTION BIPLANE: 56 %
MITRAL VALVE E/A RATIO: 0.98
RIGHT VENTRICLE FREE WALL PEAK S': 0.16 CM/S
TRICUSPID ANNULAR PLANE SYSTOLIC EXCURSION: 2 CM

## 2024-05-08 NOTE — TELEPHONE ENCOUNTER
Discussed echo results from 5/6 with Dr. Grajeda and I relayed to the patient that it looks great, the fluid is almost gone and you would not see the window on the echo. Patient was much relieved.

## 2024-05-13 ENCOUNTER — OFFICE VISIT (OUTPATIENT)
Dept: CARDIOLOGY | Facility: CLINIC | Age: 54
End: 2024-05-13
Payer: COMMERCIAL

## 2024-05-13 VITALS
SYSTOLIC BLOOD PRESSURE: 92 MMHG | BODY MASS INDEX: 25.87 KG/M2 | DIASTOLIC BLOOD PRESSURE: 60 MMHG | HEART RATE: 89 BPM | WEIGHT: 146 LBS

## 2024-05-13 DIAGNOSIS — I31.31 MALIGNANT PERICARDIAL EFFUSION (MULTI): ICD-10-CM

## 2024-05-13 DIAGNOSIS — I31.39: Primary | ICD-10-CM

## 2024-05-13 DIAGNOSIS — I31.4: Primary | ICD-10-CM

## 2024-05-13 LAB
LAB AP ASR DISCLAIMER: NORMAL
LABORATORY COMMENT REPORT: NORMAL
PATH REPORT.FINAL DX SPEC: NORMAL
PATH REPORT.GROSS SPEC: NORMAL
PATH REPORT.RELEVANT HX SPEC: NORMAL
PATH REPORT.TOTAL CANCER: NORMAL

## 2024-05-13 PROCEDURE — 1036F TOBACCO NON-USER: CPT | Performed by: STUDENT IN AN ORGANIZED HEALTH CARE EDUCATION/TRAINING PROGRAM

## 2024-05-13 PROCEDURE — 99213 OFFICE O/P EST LOW 20 MIN: CPT | Performed by: STUDENT IN AN ORGANIZED HEALTH CARE EDUCATION/TRAINING PROGRAM

## 2024-05-13 RX ORDER — IPRATROPIUM BROMIDE AND ALBUTEROL SULFATE 2.5; .5 MG/3ML; MG/3ML
3 SOLUTION RESPIRATORY (INHALATION) EVERY 6 HOURS
COMMUNITY
Start: 2024-05-02 | End: 2024-05-21 | Stop reason: ALTCHOICE

## 2024-05-13 ASSESSMENT — ENCOUNTER SYMPTOMS
PALPITATIONS: 0
NEUROLOGICAL NEGATIVE: 1
DYSPNEA ON EXERTION: 0
RESPIRATORY NEGATIVE: 1
ALLERGIC/IMMUNOLOGIC NEGATIVE: 1
ENDOCRINE NEGATIVE: 1
PND: 0
EYES NEGATIVE: 1
HEMATOLOGIC/LYMPHATIC NEGATIVE: 1
PSYCHIATRIC NEGATIVE: 1
ORTHOPNEA: 0
MUSCULOSKELETAL NEGATIVE: 1
SYNCOPE: 0
NEAR-SYNCOPE: 0
GASTROINTESTINAL NEGATIVE: 1
CONSTITUTIONAL NEGATIVE: 1

## 2024-05-13 NOTE — PROGRESS NOTES
[facility] Cardiology Outpatient Follow-up Visit     Reason for Visit: Follow-up after recent hospitalization for cardiac tamponade in setting of malignant pericardial effusion status post pericardial window    HPI: Ayesha Nova is a 53 y.o.  female who presents today for a hospital discharge follow-up after recent cardiac tamponade secondary to malignant pericardial effusion status post pericardial window with Dr. Giles.  Past medical history of metastatic breast cancer (invasive ductal carcinoma) with pulmonary metastasis, malignant pericardial effusion complicated by tamponade s/p pericardial window (5/2/2024- [facility]; Dr. Giles) .       Ayesha contacted cardiology on-call (myself) on evening of 5/1/2024 with complaints of new tachycardia, dyspnea, and intermittent chest tightness. Recommended seeking immediate medical attention given suspicion for cardiac tamponade. Patient had known moderate pericardial effusion with evidence of early tamponade physiology on prior TTE (early right atrial diastolic collapse).  Given history of metastatic breast cancer recommended cardiothoracic surgery consultation for consideration of pericardial window.      In the ED, ECG showed sinus tachycardia.  Patient noted dry cough, dyspnea.  Cardiothoracic surgery was consulted and recommended pericardial window  Patient underwent video-assisted thoracoscopic pericardial window on 5/2/2024 with Dr. Giles.  Cultures and cytology sent; later found to be consistent with metastatic pericardial effusion.  Patient was discharged home on 5/4/2024 with outpatient cardiology follow-up.    Ayesha returned to cardiology clinic on 5/13/2024.  Patient currently asymptomatic from a cardiovascular perspective.  Patient has minimal postoperative pain status post pericardial window; otherwise asymptomatic.  Patient denies any dyspnea, orthopnea, PND, syncope, presyncope, palpitations, fever/chills, nausea/vomiting, or pedal edema.  Patient  appears euvolemic on exam.  Patient agreeable to follow-up with her primary oncologist as her pericardial effusion cytology was positive for malignant pericardial effusion in setting of metastatic breast carcinoma.    Past Medical History:   - As above    Surgical History:   She has a past surgical history that includes Hysterectomy (02/29/2016); Other surgical history (04/20/2022); Other surgical history (04/20/2022); and Other surgical history (04/20/2022).    Family History:   - Reviewed; non-contributory     Allergies:  Vancomycin, Morphine, and Sulfa (sulfonamide antibiotics)     Social History:   - Former smoker (quit 4 years ago); no alcohol; no illicit drugs  - Likes to hike    Prior Cardiovascular Testing (Personally Reviewed):     PERICARDIAL FLUID -WITH CELL BLOCK (5/2/2024): Malignant cells are present compatible with metastatic carcinoma; personal history of breast infiltrating ductal carcinoma (03/2016).    Intra-op DIAN (pericardial window 5/2/2024)  Impression: Large pericardial effusion with signs of tamponade.  Superior vena caval and right atrial collapse.  No wall motion abnormalities. No significant valvular abnormalities. 1 plus MR/TR  Post pericardial window with resolution of signs of tamponade.  Total fluid removed @1ltr.    TTE (5/2/2024)  1. Left ventricular systolic function is normal with a 55-60% estimated ejection fraction.  2. Echocardiographic findings are consistent with cardiac tamponade.  3. There is a large pericardial effusion.       Review of Systems:  Review of Systems   Constitutional: Negative.   HENT: Negative.     Eyes: Negative.    Cardiovascular:  Negative for dyspnea on exertion, near-syncope, orthopnea, palpitations, paroxysmal nocturnal dyspnea and syncope.        Mild chest wall pain (post-op)   Respiratory: Negative.     Endocrine: Negative.    Hematologic/Lymphatic: Negative.    Skin: Negative.    Musculoskeletal: Negative.    Gastrointestinal: Negative.     Genitourinary: Negative.    Neurological: Negative.    Psychiatric/Behavioral: Negative.     Allergic/Immunologic: Negative.        Outpatient Medications:    Current Outpatient Medications:     ipratropium-albuteroL (Duo-Neb) 0.5-2.5 mg/3 mL nebulizer solution, Inhale 3 mL every 6 hours., Disp: , Rfl:     thyroid, pork, (NP Thyroid) 30 mg tablet, Take 1 tablet (30 mg) by mouth once daily., Disp: , Rfl:      Last Recorded Vitals  BP 92/60 (BP Location: Right arm, Patient Position: Sitting, BP Cuff Size: Adult)   Pulse 89   Wt 66.2 kg (146 lb)   BMI 25.87 kg/m²     Physical Exam:    Physical Exam  Constitutional:       General: She is not in acute distress.  HENT:      Head: Normocephalic.      Mouth/Throat:      Mouth: Mucous membranes are moist.   Eyes:      Extraocular Movements: Extraocular movements intact.      Conjunctiva/sclera: Conjunctivae normal.   Neck:      Vascular: No carotid bruit or JVD.   Cardiovascular:      Rate and Rhythm: Normal rate and regular rhythm.      Pulses: Normal pulses.      Heart sounds: No murmur heard.  Pulmonary:      Effort: Pulmonary effort is normal. No respiratory distress.      Breath sounds: Normal breath sounds.   Abdominal:      General: Bowel sounds are normal. There is no distension.      Palpations: Abdomen is soft.   Musculoskeletal:         General: No swelling.   Skin:     General: Skin is warm and dry.      Comments: Right chest incisions are well-granulated   Neurological:      General: No focal deficit present.      Mental Status: She is alert.      Cranial Nerves: No cranial nerve deficit.      Motor: No weakness.   Psychiatric:         Mood and Affect: Mood normal.         Behavior: Behavior normal.         Lab/Radiology/Diagnostic Review:    Labs    Lab Results   Component Value Date    GLUCOSE 125 (H) 05/04/2024    CALCIUM 8.8 05/04/2024     05/04/2024    K 4.3 05/04/2024    CO2 26 05/04/2024     05/04/2024    BUN 13 05/04/2024    CREATININE  "0.64 05/04/2024       Lab Results   Component Value Date    WBC 9.8 05/04/2024    HGB 11.3 (L) 05/04/2024    HCT 34.5 (L) 05/04/2024    MCV 89 05/04/2024     05/04/2024       Lab Results   Component Value Date    CHOL 132 08/23/2019     Lab Results   Component Value Date    HDL 42.9 08/23/2019     No results found for: \"LDLCALC\"  Lab Results   Component Value Date    TRIG 66 08/23/2019     No components found for: \"CHOLHDL\"    Lab Results   Component Value Date    BNP 21 05/01/2024    BNP 35 03/07/2024       Lab Results   Component Value Date    TSH 0.91 03/07/2024       Assessment:   53 y.o.  female who presents today for a hospital discharge follow-up after recent cardiac tamponade secondary to malignant pericardial effusion status post pericardial window with Dr. Giles.  Past medical history of metastatic breast cancer (invasive ductal carcinoma) with pulmonary metastasis, malignant pericardial effusion complicated by tamponade s/p pericardial window (5/2/2024- [facility]; Dr. Giles) .      Ayesha returned to cardiology clinic on 5/13/2024.  Patient currently asymptomatic from a cardiovascular perspective.  Patient has minimal postoperative pain status post pericardial window; otherwise asymptomatic.  Patient denies any dyspnea, orthopnea, PND, syncope, presyncope, palpitations, fever/chills, nausea/vomiting, or pedal edema.  Patient appears euvolemic on exam.  Patient agreeable to follow-up with her primary oncologist as her pericardial effusion cytology was positive for malignant pericardial effusion in setting of metastatic breast carcinoma.    Overall Plan:  1.  History of cardiac tamponade in setting of malignant pericardial effusion now status post video-assisted thorascopic pericardial window  - Currently asymptomatic  - Cytology confirmed malignant pericardial effusion in the setting of metastatic breast cancer  - Patient hemodynamically stable and asymptomatic  -Patient has close postop " follow-up with cardiothoracic surgery (Dr. Giles)    2. History of metastatic breast cancer (invasive ductal carcinoma)   - Management as per primary team and oncology    Disposition- Follow-up in 6 month or earlier if needed with general cardiology    Thank you for your visit today. Please contact our office with any questions.     Chas Jorgensen MD

## 2024-05-16 ENCOUNTER — TELEMEDICINE CLINICAL SUPPORT (OUTPATIENT)
Dept: CARDIAC SURGERY | Facility: CLINIC | Age: 54
End: 2024-05-16
Payer: COMMERCIAL

## 2024-05-17 LAB
LABORATORY COMMENT REPORT: NORMAL
LABORATORY COMMENT REPORT: NORMAL
PATH REPORT.ADDENDUM SPEC: NORMAL
PATH REPORT.FINAL DX SPEC: NORMAL
PATH REPORT.GROSS SPEC: NORMAL
PATH REPORT.RELEVANT HX SPEC: NORMAL
PATH REPORT.TOTAL CANCER: NORMAL

## 2024-05-20 ENCOUNTER — HOSPITAL ENCOUNTER (OUTPATIENT)
Dept: RADIOLOGY | Facility: HOSPITAL | Age: 54
Discharge: HOME | End: 2024-05-20
Payer: COMMERCIAL

## 2024-05-20 DIAGNOSIS — Z98.890 S/P PERICARDIAL WINDOW CREATION: ICD-10-CM

## 2024-05-20 PROCEDURE — 71046 X-RAY EXAM CHEST 2 VIEWS: CPT

## 2024-05-20 PROCEDURE — 71046 X-RAY EXAM CHEST 2 VIEWS: CPT | Performed by: RADIOLOGY

## 2024-05-21 ENCOUNTER — OFFICE VISIT (OUTPATIENT)
Dept: CARDIAC SURGERY | Facility: CLINIC | Age: 54
End: 2024-05-21
Payer: COMMERCIAL

## 2024-05-21 VITALS
SYSTOLIC BLOOD PRESSURE: 92 MMHG | DIASTOLIC BLOOD PRESSURE: 64 MMHG | BODY MASS INDEX: 25.69 KG/M2 | HEART RATE: 94 BPM | WEIGHT: 145 LBS | HEIGHT: 63 IN | OXYGEN SATURATION: 97 % | TEMPERATURE: 98.7 F | RESPIRATION RATE: 24 BRPM

## 2024-05-21 DIAGNOSIS — I31.39 PERICARDIAL EFFUSION (HHS-HCC): Primary | ICD-10-CM

## 2024-05-21 PROCEDURE — 1036F TOBACCO NON-USER: CPT | Performed by: THORACIC SURGERY (CARDIOTHORACIC VASCULAR SURGERY)

## 2024-05-21 PROCEDURE — 99024 POSTOP FOLLOW-UP VISIT: CPT | Performed by: THORACIC SURGERY (CARDIOTHORACIC VASCULAR SURGERY)

## 2024-05-21 RX ORDER — CEFDINIR 300 MG/1
300 CAPSULE ORAL EVERY 12 HOURS
COMMUNITY
Start: 2024-05-15

## 2024-05-21 RX ORDER — HYDROCORTISONE ACETATE PRAMOXINE HCL 1; 1 G/100G; G/100G
1 CREAM TOPICAL 2 TIMES DAILY
COMMUNITY
Start: 2024-05-15

## 2024-05-21 SDOH — ECONOMIC STABILITY: FOOD INSECURITY: WITHIN THE PAST 12 MONTHS, YOU WORRIED THAT YOUR FOOD WOULD RUN OUT BEFORE YOU GOT MONEY TO BUY MORE.: NEVER TRUE

## 2024-05-21 SDOH — ECONOMIC STABILITY: FOOD INSECURITY: WITHIN THE PAST 12 MONTHS, THE FOOD YOU BOUGHT JUST DIDN'T LAST AND YOU DIDN'T HAVE MONEY TO GET MORE.: NEVER TRUE

## 2024-05-21 ASSESSMENT — PAIN SCALES - GENERAL: PAINLEVEL: 5

## 2024-05-21 ASSESSMENT — ENCOUNTER SYMPTOMS
LOSS OF SENSATION IN FEET: 0
OCCASIONAL FEELINGS OF UNSTEADINESS: 0
DEPRESSION: 0

## 2024-05-21 NOTE — LETTER
May 21, 2024     Chas Jorgensen MD  [address]  [address]  [place] [address]    Patient: Ayesha Nova   YOB: 1970   Date of Visit: 2024       Dear Dr. Chas Jorgensen MD:    Thank you for referring Ayesha Nova to me for evaluation. Below are my notes for this consultation.  If you have questions, please do not hesitate to call me. I look forward to following your patient along with you.       Sincerely,     Beto Giles MD      CC: Karina Nicole, APRN-CNP  ______________________________________________________________________________________    Chief Complaint  POST OP Evaluation    HPI:   Ms. Ayesha Nova is a 53 y.o. female, who presents for post-operative evaluation.   She is now 1 month out from her operation, and is recovering nicely.  She has resumed normal activities and her appetite is returned to normal.  She has no chest pain, no shortness of breath and denies palpitations, dizziness, or syncope. She has some chronic cough still.      Past Medical History:   Diagnosis Date   • Personal history of malignant neoplasm of breast     History of malignant neoplasm of breast       Past Surgical History:   Procedure Laterality Date   • HYSTERECTOMY  2016    Hysterectomy   • OTHER SURGICAL HISTORY  2022    Breast reduction   • OTHER SURGICAL HISTORY  2022    Lumpectomy   • OTHER SURGICAL HISTORY  2022    Lung wedge resection       Family History   Problem Relation Name Age of Onset   • Dementia Mother     • Heart attack Father     • Other (cabg) Father         Social History     Socioeconomic History   • Marital status:      Spouse name: Not on file   • Number of children: Not on file   • Years of education: Not on file   • Highest education level: Not on file   Occupational History   • Not on file   Tobacco Use   • Smoking status: Former     Current packs/day: 0.00     Types: Cigarettes     Quit date: 2020     Years since quittin.3   •  Smokeless tobacco: Never   Substance and Sexual Activity   • Alcohol use: Never   • Drug use: Never   • Sexual activity: Not on file   Other Topics Concern   • Not on file   Social History Narrative   • Not on file     Social Determinants of Health     Financial Resource Strain: Low Risk  (5/2/2024)    Overall Financial Resource Strain (CARDIA)    • Difficulty of Paying Living Expenses: Not hard at all   Food Insecurity: No Food Insecurity (5/21/2024)    Hunger Vital Sign    • Worried About Running Out of Food in the Last Year: Never true    • Ran Out of Food in the Last Year: Never true   Transportation Needs: No Transportation Needs (5/2/2024)    PRAPARE - Transportation    • Lack of Transportation (Medical): No    • Lack of Transportation (Non-Medical): No   Physical Activity: Not on file   Stress: Not on file   Social Connections: Not on file   Intimate Partner Violence: Not on file   Housing Stability: Low Risk  (5/2/2024)    Housing Stability Vital Sign    • Unable to Pay for Housing in the Last Year: No    • Number of Places Lived in the Last Year: 1    • Unstable Housing in the Last Year: No       Allergies   Allergen Reactions   • Vancomycin Anaphylaxis   • Morphine Other     Patient states MS does not work at all for her !   • Sulfa (Sulfonamide Antibiotics) Unknown       Physical Exam  Constitutional:       Appearance: Normal appearance.   HENT:      Head: Normocephalic and atraumatic.   Eyes:      General: Scleral icterus present.      Pupils: Pupils are equal, round, and reactive to light.   Cardiovascular:      Rate and Rhythm: Normal rate.      Pulses: Normal pulses.   Abdominal:      General: Abdomen is flat.      Palpations: Abdomen is soft.   Musculoskeletal:         General: Normal range of motion.      Cervical back: Normal range of motion.   Skin:     General: Skin is warm and dry.   Neurological:      General: No focal deficit present.      Mental Status: She is alert and oriented to person,  place, and time.   Psychiatric:         Mood and Affect: Mood normal.         Encounter Date: 05/01/24   Electrocardiogram, 12-lead PRN ACS symptoms   Result Value    Systolic blood pressure 103    Diastolic blood pressure 80    Ventricular Rate 84    Atrial Rate 84    DC Interval 148    QRS Duration 70    QT Interval 392    QTC Calculation(Bazett) 463    P Axis 48    R Axis 106    T Axis 32    QRS Count 14    Q Onset 220    P Onset 146    P Offset 198    T Offset 416    QTC Fredericia 438    Narrative    Normal sinus rhythm  Rightward axis  Low voltage QRS  Cannot rule out Anterior infarct , age undetermined  Abnormal ECG  No previous ECGs available  Confirmed by Juan Higuera ([id number]) on 5/6/2024 4:11:28 PM     CXR: 5/20/24:  no evidence of ptx or effusion.  Consolidation of the RML.     Assessment and Plan:    Ms. Ayesha Nova is a 53 y.o. female, who is recovering well after surgery.  I have released them from sternal precautions and referred them for cardiopulmonary rehab.  I have encouraged them to slowly return to normal activities, but refrain from heavy lifting for a full 12 weeks after surgery.  She will continue cardiovascular management with their cardiologist.  I am always happy to see them for any reason, but have released them from the practice.

## 2024-05-21 NOTE — PROGRESS NOTES
Chief Complaint  POST OP Evaluation    HPI:   Ms. Ayesha Nova is a 53 y.o. female, who presents for post-operative evaluation.   She is now 1 month out from her operation, and is recovering nicely.  She has resumed normal activities and her appetite is returned to normal.  She has no chest pain, no shortness of breath and denies palpitations, dizziness, or syncope. She has some chronic cough still.      Past Medical History:   Diagnosis Date    Personal history of malignant neoplasm of breast     History of malignant neoplasm of breast       Past Surgical History:   Procedure Laterality Date    HYSTERECTOMY  2016    Hysterectomy    OTHER SURGICAL HISTORY  2022    Breast reduction    OTHER SURGICAL HISTORY  2022    Lumpectomy    OTHER SURGICAL HISTORY  2022    Lung wedge resection       Family History   Problem Relation Name Age of Onset    Dementia Mother      Heart attack Father      Other (cabg) Father         Social History     Socioeconomic History    Marital status:      Spouse name: Not on file    Number of children: Not on file    Years of education: Not on file    Highest education level: Not on file   Occupational History    Not on file   Tobacco Use    Smoking status: Former     Current packs/day: 0.00     Types: Cigarettes     Quit date:      Years since quittin.3    Smokeless tobacco: Never   Substance and Sexual Activity    Alcohol use: Never    Drug use: Never    Sexual activity: Not on file   Other Topics Concern    Not on file   Social History Narrative    Not on file     Social Determinants of Health     Financial Resource Strain: Low Risk  (2024)    Overall Financial Resource Strain (CARDIA)     Difficulty of Paying Living Expenses: Not hard at all   Food Insecurity: No Food Insecurity (2024)    Hunger Vital Sign     Worried About Running Out of Food in the Last Year: Never true     Ran Out of Food in the Last Year: Never true   Transportation Needs: No  Transportation Needs (5/2/2024)    PRAPARE - Transportation     Lack of Transportation (Medical): No     Lack of Transportation (Non-Medical): No   Physical Activity: Not on file   Stress: Not on file   Social Connections: Not on file   Intimate Partner Violence: Not on file   Housing Stability: Low Risk  (5/2/2024)    Housing Stability Vital Sign     Unable to Pay for Housing in the Last Year: No     Number of Places Lived in the Last Year: 1     Unstable Housing in the Last Year: No       Allergies   Allergen Reactions    Vancomycin Anaphylaxis    Morphine Other     Patient states MS does not work at all for her !    Sulfa (Sulfonamide Antibiotics) Unknown       Physical Exam  Constitutional:       Appearance: Normal appearance.   HENT:      Head: Normocephalic and atraumatic.   Eyes:      General: Scleral icterus present.      Pupils: Pupils are equal, round, and reactive to light.   Cardiovascular:      Rate and Rhythm: Normal rate.      Pulses: Normal pulses.   Abdominal:      General: Abdomen is flat.      Palpations: Abdomen is soft.   Musculoskeletal:         General: Normal range of motion.      Cervical back: Normal range of motion.   Skin:     General: Skin is warm and dry.   Neurological:      General: No focal deficit present.      Mental Status: She is alert and oriented to person, place, and time.   Psychiatric:         Mood and Affect: Mood normal.         Encounter Date: 05/01/24   Electrocardiogram, 12-lead PRN ACS symptoms   Result Value    Systolic blood pressure 103    Diastolic blood pressure 80    Ventricular Rate 84    Atrial Rate 84    UT Interval 148    QRS Duration 70    QT Interval 392    QTC Calculation(Bazett) 463    P Axis 48    R Axis 106    T Axis 32    QRS Count 14    Q Onset 220    P Onset 146    P Offset 198    T Offset 416    QTC Fredericia 438    Narrative    Normal sinus rhythm  Rightward axis  Low voltage QRS  Cannot rule out Anterior infarct , age undetermined  Abnormal  ECG  No previous ECGs available  Confirmed by Juan Higuera ([id number]) on 5/6/2024 4:11:28 PM     CXR: 5/20/24:  no evidence of ptx or effusion.  Consolidation of the RML.     Assessment and Plan:    Ms. Ayesha Nova is a 53 y.o. female, who is recovering well after surgery.  I have released them from sternal precautions and referred them for cardiopulmonary rehab.  I have encouraged them to slowly return to normal activities, but refrain from heavy lifting for a full 12 weeks after surgery.  She will continue cardiovascular management with their cardiologist.  I am always happy to see them for any reason, but have released them from the practice.

## 2024-06-03 ENCOUNTER — APPOINTMENT (OUTPATIENT)
Dept: RADIOLOGY | Facility: HOSPITAL | Age: 54
End: 2024-06-03
Payer: COMMERCIAL

## 2024-06-03 ENCOUNTER — HOSPITAL ENCOUNTER (EMERGENCY)
Facility: HOSPITAL | Age: 54
Discharge: HOME | End: 2024-06-03
Attending: STUDENT IN AN ORGANIZED HEALTH CARE EDUCATION/TRAINING PROGRAM
Payer: COMMERCIAL

## 2024-06-03 ENCOUNTER — APPOINTMENT (OUTPATIENT)
Dept: CARDIAC SURGERY | Facility: CLINIC | Age: 54
End: 2024-06-03
Payer: COMMERCIAL

## 2024-06-03 ENCOUNTER — APPOINTMENT (OUTPATIENT)
Dept: CARDIOLOGY | Facility: HOSPITAL | Age: 54
End: 2024-06-03
Payer: COMMERCIAL

## 2024-06-03 VITALS
SYSTOLIC BLOOD PRESSURE: 121 MMHG | DIASTOLIC BLOOD PRESSURE: 62 MMHG | OXYGEN SATURATION: 96 % | TEMPERATURE: 98.1 F | HEART RATE: 98 BPM | BODY MASS INDEX: 25.15 KG/M2 | WEIGHT: 142 LBS | RESPIRATION RATE: 26 BRPM

## 2024-06-03 DIAGNOSIS — R07.9 CHEST PAIN, UNSPECIFIED TYPE: Primary | ICD-10-CM

## 2024-06-03 LAB
ALBUMIN SERPL BCP-MCNC: 4.1 G/DL (ref 3.4–5)
ALP SERPL-CCNC: 38 U/L (ref 33–110)
ALT SERPL W P-5'-P-CCNC: 7 U/L (ref 7–45)
ANION GAP SERPL CALC-SCNC: 12 MMOL/L (ref 10–20)
AST SERPL W P-5'-P-CCNC: 15 U/L (ref 9–39)
BASOPHILS # BLD AUTO: 0.02 X10*3/UL (ref 0–0.1)
BASOPHILS NFR BLD AUTO: 0.3 %
BILIRUB SERPL-MCNC: 0.6 MG/DL (ref 0–1.2)
BUN SERPL-MCNC: 15 MG/DL (ref 6–23)
CALCIUM SERPL-MCNC: 9.9 MG/DL (ref 8.6–10.3)
CARDIAC TROPONIN I PNL SERPL HS: <3 NG/L (ref 0–13)
CARDIAC TROPONIN I PNL SERPL HS: <3 NG/L (ref 0–13)
CHLORIDE SERPL-SCNC: 105 MMOL/L (ref 98–107)
CO2 SERPL-SCNC: 27 MMOL/L (ref 21–32)
CREAT SERPL-MCNC: 0.71 MG/DL (ref 0.5–1.05)
EGFRCR SERPLBLD CKD-EPI 2021: >90 ML/MIN/1.73M*2
EOSINOPHIL # BLD AUTO: 0.08 X10*3/UL (ref 0–0.7)
EOSINOPHIL NFR BLD AUTO: 1.2 %
ERYTHROCYTE [DISTWIDTH] IN BLOOD BY AUTOMATED COUNT: 13.2 % (ref 11.5–14.5)
GLUCOSE SERPL-MCNC: 87 MG/DL (ref 74–99)
HCT VFR BLD AUTO: 38.9 % (ref 36–46)
HGB BLD-MCNC: 12.5 G/DL (ref 12–16)
IMM GRANULOCYTES # BLD AUTO: 0.02 X10*3/UL (ref 0–0.7)
IMM GRANULOCYTES NFR BLD AUTO: 0.3 % (ref 0–0.9)
LYMPHOCYTES # BLD AUTO: 1.02 X10*3/UL (ref 1.2–4.8)
LYMPHOCYTES NFR BLD AUTO: 14.8 %
MCH RBC QN AUTO: 28.7 PG (ref 26–34)
MCHC RBC AUTO-ENTMCNC: 32.1 G/DL (ref 32–36)
MCV RBC AUTO: 89 FL (ref 80–100)
MONOCYTES # BLD AUTO: 0.41 X10*3/UL (ref 0.1–1)
MONOCYTES NFR BLD AUTO: 6 %
NEUTROPHILS # BLD AUTO: 5.32 X10*3/UL (ref 1.2–7.7)
NEUTROPHILS NFR BLD AUTO: 77.4 %
NRBC BLD-RTO: 0 /100 WBCS (ref 0–0)
PLATELET # BLD AUTO: 381 X10*3/UL (ref 150–450)
POTASSIUM SERPL-SCNC: 3.8 MMOL/L (ref 3.5–5.3)
PROT SERPL-MCNC: 7.2 G/DL (ref 6.4–8.2)
RBC # BLD AUTO: 4.36 X10*6/UL (ref 4–5.2)
SODIUM SERPL-SCNC: 140 MMOL/L (ref 136–145)
WBC # BLD AUTO: 6.9 X10*3/UL (ref 4.4–11.3)

## 2024-06-03 PROCEDURE — 71045 X-RAY EXAM CHEST 1 VIEW: CPT

## 2024-06-03 PROCEDURE — 99284 EMERGENCY DEPT VISIT MOD MDM: CPT | Mod: 25 | Performed by: STUDENT IN AN ORGANIZED HEALTH CARE EDUCATION/TRAINING PROGRAM

## 2024-06-03 PROCEDURE — 2500000005 HC RX 250 GENERAL PHARMACY W/O HCPCS: Performed by: STUDENT IN AN ORGANIZED HEALTH CARE EDUCATION/TRAINING PROGRAM

## 2024-06-03 PROCEDURE — 84075 ASSAY ALKALINE PHOSPHATASE: CPT | Performed by: STUDENT IN AN ORGANIZED HEALTH CARE EDUCATION/TRAINING PROGRAM

## 2024-06-03 PROCEDURE — 71045 X-RAY EXAM CHEST 1 VIEW: CPT | Performed by: RADIOLOGY

## 2024-06-03 PROCEDURE — 2550000001 HC RX 255 CONTRASTS: Performed by: STUDENT IN AN ORGANIZED HEALTH CARE EDUCATION/TRAINING PROGRAM

## 2024-06-03 PROCEDURE — 96374 THER/PROPH/DIAG INJ IV PUSH: CPT | Performed by: STUDENT IN AN ORGANIZED HEALTH CARE EDUCATION/TRAINING PROGRAM

## 2024-06-03 PROCEDURE — 36415 COLL VENOUS BLD VENIPUNCTURE: CPT | Performed by: STUDENT IN AN ORGANIZED HEALTH CARE EDUCATION/TRAINING PROGRAM

## 2024-06-03 PROCEDURE — 84484 ASSAY OF TROPONIN QUANT: CPT | Performed by: STUDENT IN AN ORGANIZED HEALTH CARE EDUCATION/TRAINING PROGRAM

## 2024-06-03 PROCEDURE — 93005 ELECTROCARDIOGRAM TRACING: CPT

## 2024-06-03 PROCEDURE — 85025 COMPLETE CBC W/AUTO DIFF WBC: CPT | Performed by: STUDENT IN AN ORGANIZED HEALTH CARE EDUCATION/TRAINING PROGRAM

## 2024-06-03 PROCEDURE — 2500000004 HC RX 250 GENERAL PHARMACY W/ HCPCS (ALT 636 FOR OP/ED): Performed by: STUDENT IN AN ORGANIZED HEALTH CARE EDUCATION/TRAINING PROGRAM

## 2024-06-03 PROCEDURE — 71275 CT ANGIOGRAPHY CHEST: CPT

## 2024-06-03 PROCEDURE — 71275 CT ANGIOGRAPHY CHEST: CPT | Mod: FOREIGN READ | Performed by: RADIOLOGY

## 2024-06-03 PROCEDURE — 99285 EMERGENCY DEPT VISIT HI MDM: CPT | Mod: 25 | Performed by: STUDENT IN AN ORGANIZED HEALTH CARE EDUCATION/TRAINING PROGRAM

## 2024-06-03 RX ORDER — LIDOCAINE 560 MG/1
1 PATCH PERCUTANEOUS; TOPICAL; TRANSDERMAL ONCE
Status: DISCONTINUED | OUTPATIENT
Start: 2024-06-03 | End: 2024-06-04 | Stop reason: HOSPADM

## 2024-06-03 RX ORDER — LIDOCAINE 560 MG/1
1 PATCH PERCUTANEOUS; TOPICAL; TRANSDERMAL DAILY
Qty: 5 PATCH | Refills: 0 | Status: SHIPPED | OUTPATIENT
Start: 2024-06-03 | End: 2024-06-08

## 2024-06-03 RX ORDER — KETOROLAC TROMETHAMINE 30 MG/ML
15 INJECTION, SOLUTION INTRAMUSCULAR; INTRAVENOUS ONCE
Status: COMPLETED | OUTPATIENT
Start: 2024-06-03 | End: 2024-06-03

## 2024-06-03 RX ADMIN — IOHEXOL 75 ML: 350 INJECTION, SOLUTION INTRAVENOUS at 17:30

## 2024-06-03 RX ADMIN — LIDOCAINE 4% 1 PATCH: 40 PATCH TOPICAL at 20:50

## 2024-06-03 RX ADMIN — KETOROLAC TROMETHAMINE 15 MG: 30 INJECTION, SOLUTION INTRAMUSCULAR at 20:51

## 2024-06-03 ASSESSMENT — COLUMBIA-SUICIDE SEVERITY RATING SCALE - C-SSRS
2. HAVE YOU ACTUALLY HAD ANY THOUGHTS OF KILLING YOURSELF?: NO
1. IN THE PAST MONTH, HAVE YOU WISHED YOU WERE DEAD OR WISHED YOU COULD GO TO SLEEP AND NOT WAKE UP?: NO
6. HAVE YOU EVER DONE ANYTHING, STARTED TO DO ANYTHING, OR PREPARED TO DO ANYTHING TO END YOUR LIFE?: NO

## 2024-06-03 ASSESSMENT — PAIN - FUNCTIONAL ASSESSMENT: PAIN_FUNCTIONAL_ASSESSMENT: 0-10

## 2024-06-03 ASSESSMENT — PAIN SCALES - GENERAL
PAINLEVEL_OUTOF10: 0 - NO PAIN

## 2024-06-03 NOTE — ED PROVIDER NOTES
HPI   Chief Complaint   Patient presents with    Chest Pain     Pt arrives [facility] from home. States CP x 1 month after having pericardial effusion last month with pericardial window done to relieve it. Pt states pain worsened today on left side and gets worse with laying down or with activity. VSS upon arrival to  22       53-year-old history of metastatic breast cancer, previous history of CO2, cardiac tamponade 1 month prior status post pericardial window presenting emerged part for chest pain.  On examination patient is medically stable well-appearing no significant stress 2+ peripheral pulses.  Equal breath sounds bilaterally.  Patient I think otherwise has no aberrations on examination.  Differential diagnosis includes pericardial effusion, pulmonary embolism, angina, ACS.  Patient does have moderate to high Wells score thus CT of the chest has been ordered.  Bedside ultrasound showed pericardial effusion with no evidence of tamponade.  EKG troponin CBC CMP CT of the chest has been ordered along with a chest x-ray.  Patient will be routinely reevaluated                          [place] Coma Scale Score: 15                     Patient History   Past Medical History:   Diagnosis Date    Personal history of malignant neoplasm of breast     History of malignant neoplasm of breast     Past Surgical History:   Procedure Laterality Date    HYSTERECTOMY  2016    Hysterectomy    OTHER SURGICAL HISTORY  2022    Breast reduction    OTHER SURGICAL HISTORY  2022    Lumpectomy    OTHER SURGICAL HISTORY  2022    Lung wedge resection     Family History   Problem Relation Name Age of Onset    Dementia Mother      Heart attack Father      Other (cabg) Father       Social History     Tobacco Use    Smoking status: Former     Current packs/day: 0.00     Types: Cigarettes     Quit date:      Years since quittin.4    Smokeless tobacco: Never   Substance Use Topics    Alcohol use: Never    Drug  use: Never       Physical Exam   ED Triage Vitals [06/03/24 1441]   Temperature Heart Rate Respirations BP   36.7 °C (98.1 °F) 92 20 121/62      Pulse Ox Temp src Heart Rate Source Patient Position   98 % -- -- --      BP Location FiO2 (%)     -- --       Physical Exam    ED Course & [facility]   ED Course as of 06/03/24 2052 Mon Jun 03, 2024   1447 53-year-old history of metastatic breast cancer, previous history of CO2, cardiac tamponade 1 month prior status post pericardial window presenting emerged part for chest pain.  On examination patient is medically stable well-appearing no significant stress 2+ peripheral pulses.  Equal breath sounds bilaterally.  Patient I think otherwise has no aberrations on examination.  Differential diagnosis includes pericardial effusion, pulmonary embolism, angina, ACS.  Patient does have moderate to high Wells score thus CT of the chest has been ordered.  Bedside ultrasound showed pericardial effusion with no evidence of tamponade.  EKG troponin CBC CMP CT of the chest has been ordered along with a chest x-ray.  Patient will be routinely reevaluated [ZS]   2044 CT angio chest for pulmonary embolism  No evidence of pulmonary embolism worsening malignancy appreciated.  Patient will be given Toradol and lidocaine patch [ZS]   2051 EKG interpreted by me shows sinus rhythm left axis deviation.  Ventricular 91  QRS 68 QTc 442 no acute STEMI appreciated [ZS]      ED Course User Index  [ZS] Asaf Melissa MD         Diagnoses as of 06/03/24 2052   Chest pain, unspecified type       Medical Decision Making      Procedure  Procedures     Asaf Melissa MD  06/03/24 2052

## 2024-06-04 NOTE — DISCHARGE INSTRUCTIONS
Please follow-up with your oncologist and primary care physician.  Your CT showed worsening tumor in your lungs with invasion into multiple structures.  I recommend you return to the ER if you have any worsening symptoms.  Read the provided information packets.  I provided you a copy of the CAT scan that you can show your oncologist.    You have been evaluated in the Emergency Department for chest pain. There are many different causes of chest pain. Some of these causes could be serious, such as a heart attack or blood clot in the lungs, but many other causes are not life threatening, such as heartburn, viral infections, bruised bones/muscles, etc. On evaluation we did not find any emergent causes for your chest pain at this time.     Please return to Emergency Department or seek medical attention immediately if you have acute worsening in your chest pain or develop shortness of breath, repeated vomiting, fever, altered level of consciousness, coughing up blood, or start sweating and feel clammy.    If you were prescribed any medicine for home, please take as prescribed by your health-care provider. If you were given any follow-up appointments or numbers to call, please do so as instructed. Avoid any activities that bring on the chest pain. Also, avoid any tobacco products or excessive alcohol. Please ensure understanding of these instructions prior to discharge.    Please call your primary care physician and follow-up with them in the next 1 to 2 days.

## 2024-06-09 LAB
ATRIAL RATE: 91 BPM
P AXIS: 118 DEGREES
P OFFSET: 203 MS
P ONSET: 156 MS
PR INTERVAL: 136 MS
Q ONSET: 224 MS
QRS COUNT: 14 BEATS
QRS DURATION: 68 MS
QT INTERVAL: 360 MS
QTC CALCULATION(BAZETT): 442 MS
QTC FREDERICIA: 414 MS
R AXIS: 202 DEGREES
T AXIS: 152 DEGREES
T OFFSET: 404 MS
VENTRICULAR RATE: 91 BPM

## 2024-06-28 ENCOUNTER — HOSPITAL ENCOUNTER (OUTPATIENT)
Dept: RADIOLOGY | Facility: HOSPITAL | Age: 54
Discharge: HOME | End: 2024-06-28
Payer: COMMERCIAL

## 2024-06-28 DIAGNOSIS — R07.9 CHEST PAIN, UNSPECIFIED: ICD-10-CM

## 2024-06-28 DIAGNOSIS — Z87.01 PERSONAL HISTORY OF PNEUMONIA (RECURRENT): ICD-10-CM

## 2024-06-28 PROCEDURE — 71046 X-RAY EXAM CHEST 2 VIEWS: CPT

## 2024-07-08 ENCOUNTER — OFFICE VISIT (OUTPATIENT)
Dept: HEMATOLOGY/ONCOLOGY | Facility: CLINIC | Age: 54
End: 2024-07-08
Payer: COMMERCIAL

## 2024-07-08 ENCOUNTER — LAB (OUTPATIENT)
Dept: LAB | Facility: CLINIC | Age: 54
End: 2024-07-08
Payer: COMMERCIAL

## 2024-07-08 VITALS
BODY MASS INDEX: 25.56 KG/M2 | DIASTOLIC BLOOD PRESSURE: 58 MMHG | WEIGHT: 138.89 LBS | TEMPERATURE: 98.6 F | HEIGHT: 62 IN | HEART RATE: 104 BPM | RESPIRATION RATE: 20 BRPM | SYSTOLIC BLOOD PRESSURE: 103 MMHG | OXYGEN SATURATION: 98 %

## 2024-07-08 DIAGNOSIS — C78.01 MALIGNANT NEOPLASM METASTATIC TO BOTH LUNGS (MULTI): ICD-10-CM

## 2024-07-08 DIAGNOSIS — C78.02 MALIGNANT NEOPLASM METASTATIC TO BOTH LUNGS (MULTI): Primary | ICD-10-CM

## 2024-07-08 DIAGNOSIS — C78.02 MALIGNANT NEOPLASM METASTATIC TO BOTH LUNGS (MULTI): ICD-10-CM

## 2024-07-08 DIAGNOSIS — C78.01 MALIGNANT NEOPLASM METASTATIC TO BOTH LUNGS (MULTI): Primary | ICD-10-CM

## 2024-07-08 LAB
ALBUMIN SERPL BCP-MCNC: 4 G/DL (ref 3.4–5)
ALP SERPL-CCNC: 47 U/L (ref 33–110)
ALT SERPL W P-5'-P-CCNC: 9 U/L (ref 7–45)
ANION GAP SERPL CALC-SCNC: 13 MMOL/L (ref 10–20)
AST SERPL W P-5'-P-CCNC: 16 U/L (ref 9–39)
BASOPHILS # BLD AUTO: 0.04 X10*3/UL (ref 0–0.1)
BASOPHILS NFR BLD AUTO: 0.5 %
BILIRUB SERPL-MCNC: 0.7 MG/DL (ref 0–1.2)
BUN SERPL-MCNC: 7 MG/DL (ref 6–23)
CALCIUM SERPL-MCNC: 9.5 MG/DL (ref 8.6–10.3)
CANCER AG27-29 SERPL-ACNC: 313.9 U/ML (ref 0–38.6)
CHLORIDE SERPL-SCNC: 103 MMOL/L (ref 98–107)
CO2 SERPL-SCNC: 26 MMOL/L (ref 21–32)
CREAT SERPL-MCNC: 0.76 MG/DL (ref 0.5–1.05)
EGFRCR SERPLBLD CKD-EPI 2021: >90 ML/MIN/1.73M*2
EOSINOPHIL # BLD AUTO: 0.03 X10*3/UL (ref 0–0.7)
EOSINOPHIL NFR BLD AUTO: 0.4 %
ERYTHROCYTE [DISTWIDTH] IN BLOOD BY AUTOMATED COUNT: 13 % (ref 11.5–14.5)
GLUCOSE SERPL-MCNC: 103 MG/DL (ref 74–99)
HCT VFR BLD AUTO: 37.9 % (ref 36–46)
HGB BLD-MCNC: 12.6 G/DL (ref 12–16)
IMM GRANULOCYTES # BLD AUTO: 0.02 X10*3/UL (ref 0–0.7)
IMM GRANULOCYTES NFR BLD AUTO: 0.3 % (ref 0–0.9)
LYMPHOCYTES # BLD AUTO: 1.66 X10*3/UL (ref 1.2–4.8)
LYMPHOCYTES NFR BLD AUTO: 21.4 %
MCH RBC QN AUTO: 28.7 PG (ref 26–34)
MCHC RBC AUTO-ENTMCNC: 33.2 G/DL (ref 32–36)
MCV RBC AUTO: 86 FL (ref 80–100)
MONOCYTES # BLD AUTO: 0.53 X10*3/UL (ref 0.1–1)
MONOCYTES NFR BLD AUTO: 6.8 %
NEUTROPHILS # BLD AUTO: 5.47 X10*3/UL (ref 1.2–7.7)
NEUTROPHILS NFR BLD AUTO: 70.6 %
NRBC BLD-RTO: 0 /100 WBCS (ref 0–0)
PLATELET # BLD AUTO: 391 X10*3/UL (ref 150–450)
POTASSIUM SERPL-SCNC: 4.8 MMOL/L (ref 3.5–5.3)
PROT SERPL-MCNC: 7.2 G/DL (ref 6.4–8.2)
RBC # BLD AUTO: 4.39 X10*6/UL (ref 4–5.2)
SODIUM SERPL-SCNC: 137 MMOL/L (ref 136–145)
WBC # BLD AUTO: 7.8 X10*3/UL (ref 4.4–11.3)

## 2024-07-08 PROCEDURE — 86300 IMMUNOASSAY TUMOR CA 15-3: CPT | Mod: PARLAB

## 2024-07-08 PROCEDURE — 99215 OFFICE O/P EST HI 40 MIN: CPT | Performed by: INTERNAL MEDICINE

## 2024-07-08 PROCEDURE — 85025 COMPLETE CBC W/AUTO DIFF WBC: CPT

## 2024-07-08 PROCEDURE — 80053 COMPREHEN METABOLIC PANEL: CPT

## 2024-07-08 PROCEDURE — 36415 COLL VENOUS BLD VENIPUNCTURE: CPT

## 2024-07-08 RX ORDER — CLINDAMYCIN HYDROCHLORIDE 150 MG/1
CAPSULE ORAL 4 TIMES DAILY
COMMUNITY

## 2024-07-08 ASSESSMENT — PAIN SCALES - GENERAL: PAINLEVEL: 5

## 2024-07-08 NOTE — PROGRESS NOTES
Patient ID: : Ayesha Nova            Primary Care Provider: BYRON House    LOCATION:  [facility]    HEMATOLOGY ONCOLOGY PROBLEMS:  1.  Recurrent metastatic breast cancer.    CHIEF COMPLAINTS:  Patient is in the clinic today for evaluation of recurrent metastatic breast cancer.    HISTORY:  Ayesha Nova is a 53 y.o. female with longstanding history of recurrent metastatic breast cancer.  She was initially diagnosed in 2016 when she self palpated a right breast lump with follow-up mammogram/ultrasound confirming 1.7 x 1.4 cm spiculated mass at 1 o'clock position.  There was also finding of 8 mm nodule in the left breast.  Right breast stereotactic needle core biopsy from March 2016 confirmed invasive ductal carcinoma, G3, strongly ER positive, moderately NJ positive and HER2/della amplified.  Left breast biopsy was mainly suggestive of fibrocystic changes along with focal atypical ductal hyperplasia.  A follow-up excisional biopsy of the left breast showed no residual findings.  She declined adjuvant chemo, radiation, Herceptin or hormonal therapy.  In 2018 she was noted to have a left lower lobe nodule and underwent surgical resection with pathology results confirming metastatic disease.  She was lost to follow-up for long time and was reevaluated in 2021.  Over time she has been evaluated by Dr. Alonso, Dr. Soliz and also by different physicians at [facility].  So far she has declined any treatment.  During her last evaluation with Dr. Soliz, she was advised initiation of systemic therapy with Herceptin/Perjeta based combination.    INTERVAL HISTORY:  She is being reevaluated after a gap of 4 months.  Her clinical course was complicated by finding of cardiac tamponade due to malignant pericardial effusion requiring cardiac window procedure.  Latest CT angiogram from 6/3/2024 is concerning for overall disease progression.  Clinically she is having problem with  "worsening chest pain.    PAST MEDICAL HISTORY:  1.  Recurrent breast cancer as detailed above.  2.  Hypothyroidism  3.  Endometriosis.  S/p complete hysterectomy in     SOCIAL HISTORY:   for 27 years and lives in [place].  Quit smoking around .  1 pack a day for 15 years prior smoking history.  Rare social alcohol intake.  She has been a homemaker.  Born and raised in [facility].    FAMILY HISTORY:  Father  at age 69 from myocardial infarction.  Mother age 85 and has dementia.  1 brother and 2 children ~ alive and well.  Maternal aunt had a history of early stage breast cancer.  Her maternal cousin  from metastatic breast cancer.  No other family history of bleeding, clotting or malignant disorders in the family history.    OB/GYN history.    Postmenopausal secondary to complete hysterectomy in .  G3, P2 M1.  Normal pregnancies with normal deliveries.  No history of use of birth control pills or HRT.    REVIEW OF SYSTEMS:   Pertinent finding as per the history above.  All other systems have been reviewed and generally negative and noncontributory.    VITAL SIGNS  BSA: 1.67 meters squared  /58   Pulse 104   Temp 37 °C (98.6 °F) (Temporal)   Resp 20   Ht (S) 1.586 m (5' 2.44\")   Wt 63 kg (138 lb 14.2 oz)   SpO2 98%   BMI 25.05 kg/m²     PHYSICAL EXAMINATION:  Detailed physical examination not done.    LAB DATA:  CBC, metabolic profile etc. were all normal on 2024.    RADIOLOGICAL DATA:  CT angio chest, abdomen and pelvis 2024  Impression:  1. Minimal interval increase of large mass centered at the right hilum, encasing the distal right main pulmonary artery and right mainstem bronchus, and infiltrating the mediastinum.  2. Increasing lymphangitic spread of tumor along the broncho-vascular bundles, especially to the right upper lobe.  3. Interval development of small right pleural effusion.  4. No detectable pulmonary emboli.    ASSESSMENT / PLAN:  1.  Recurrent " breast cancer.  Please refer the details as outlined above.  In summary patient was initially diagnosed with early stage disease in 2016 with triple positive breast cancer.  Declined adjuvant therapies at that time.  Since 2018 she has been noted to have recurrent disease and was treated with surgical resection of the left lung nodule.  Since 2021 she has been noted to have metastatic disease but has declined on treatment so far.    As stated above her latest clinical course has been complicated by development of pericardial tamponade due to malignant effusion requiring CT surgery intervention with cardiac window procedure.  Latest CT scan results are concerning for overall disease progression especially in the right lung.  It was clearly explained to the patient that she may need appropriate systemic therapy.  In view of the latest cardiac issues, I am nervous regarding the use of Herceptin or anthracycline-based treatment.  I will try to check a stat PET scan for better evaluation of the latest disease status.  One option will be to start her on nonanthracycline-based chemotherapy followed by transition to oral CD4/endocrine therapy combination.  The other option will be for her to be evaluated at our breast oncology clinic at [facility].  Patient is clearly explained that her disease is progressing fast and her overall prognosis is extremely guarded and it would not be advisable for her to delay the treatment anymore.    2.  Follow-up.  She will return to the clinic immediately after PET scan.        This note has been transcribed using Dragon voice recognition system and there is a possibility of unintentional typing misprints.

## 2024-08-06 ENCOUNTER — HOSPITAL ENCOUNTER (OUTPATIENT)
Dept: RADIOLOGY | Facility: HOSPITAL | Age: 54
Discharge: HOME | End: 2024-08-06
Payer: COMMERCIAL

## 2024-08-06 DIAGNOSIS — R07.9 CHEST PAIN, UNSPECIFIED: ICD-10-CM

## 2024-08-06 DIAGNOSIS — R05.9 COUGH, UNSPECIFIED: ICD-10-CM

## 2024-08-06 PROCEDURE — 71046 X-RAY EXAM CHEST 2 VIEWS: CPT | Performed by: RADIOLOGY

## 2024-08-06 PROCEDURE — 71046 X-RAY EXAM CHEST 2 VIEWS: CPT

## 2024-08-25 ENCOUNTER — APPOINTMENT (OUTPATIENT)
Dept: RADIOLOGY | Facility: HOSPITAL | Age: 54
DRG: 163 | End: 2024-08-25
Payer: COMMERCIAL

## 2024-08-25 ENCOUNTER — APPOINTMENT (OUTPATIENT)
Dept: CARDIOLOGY | Facility: HOSPITAL | Age: 54
End: 2024-08-25
Payer: COMMERCIAL

## 2024-08-25 ENCOUNTER — HOSPITAL ENCOUNTER (INPATIENT)
Facility: HOSPITAL | Age: 54
End: 2024-08-25
Attending: EMERGENCY MEDICINE | Admitting: INTERNAL MEDICINE
Payer: COMMERCIAL

## 2024-08-25 ENCOUNTER — APPOINTMENT (OUTPATIENT)
Dept: CARDIOLOGY | Facility: HOSPITAL | Age: 54
DRG: 163 | End: 2024-08-25
Payer: COMMERCIAL

## 2024-08-25 DIAGNOSIS — C78.02 MALIGNANT NEOPLASM METASTATIC TO BOTH LUNGS: ICD-10-CM

## 2024-08-25 DIAGNOSIS — R07.9 CHEST PAIN, UNSPECIFIED TYPE: Primary | ICD-10-CM

## 2024-08-25 DIAGNOSIS — I31.39 PERICARDIAL EFFUSION (HHS-HCC): ICD-10-CM

## 2024-08-25 DIAGNOSIS — I31.4 PERICARDIAL EFFUSION WITH CARDIAC TAMPONADE: ICD-10-CM

## 2024-08-25 DIAGNOSIS — I31.39 PERICARDIAL EFFUSION WITH CARDIAC TAMPONADE: ICD-10-CM

## 2024-08-25 DIAGNOSIS — C78.01 MALIGNANT NEOPLASM METASTATIC TO BOTH LUNGS: ICD-10-CM

## 2024-08-25 DIAGNOSIS — J90 PLEURAL EFFUSION: ICD-10-CM

## 2024-08-25 DIAGNOSIS — I31.31 MALIGNANT PERICARDIAL EFFUSION (MULTI): ICD-10-CM

## 2024-08-25 DIAGNOSIS — I31.31 MALIGNANT PERICARDIAL EFFUSION IN DISEASES CLASSIFIED ELSEWHERE (MULTI): ICD-10-CM

## 2024-08-25 LAB
ALBUMIN SERPL BCP-MCNC: 3.9 G/DL (ref 3.4–5)
ALP SERPL-CCNC: 45 U/L (ref 33–110)
ALT SERPL W P-5'-P-CCNC: 6 U/L (ref 7–45)
ANION GAP SERPL CALC-SCNC: 13 MMOL/L (ref 10–20)
ANION GAP SERPL CALC-SCNC: 16 MMOL/L (ref 10–20)
AST SERPL W P-5'-P-CCNC: 15 U/L (ref 9–39)
BASOPHILS # BLD AUTO: 0.03 X10*3/UL (ref 0–0.1)
BASOPHILS NFR BLD AUTO: 0.3 %
BILIRUB SERPL-MCNC: 0.8 MG/DL (ref 0–1.2)
BUN SERPL-MCNC: 12 MG/DL (ref 6–23)
BUN SERPL-MCNC: 15 MG/DL (ref 6–23)
CALCIUM SERPL-MCNC: 8.9 MG/DL (ref 8.6–10.3)
CALCIUM SERPL-MCNC: 9.9 MG/DL (ref 8.6–10.3)
CARDIAC TROPONIN I PNL SERPL HS: 4 NG/L (ref 0–13)
CARDIAC TROPONIN I PNL SERPL HS: 4 NG/L (ref 0–13)
CHLORIDE SERPL-SCNC: 105 MMOL/L (ref 98–107)
CHLORIDE SERPL-SCNC: 99 MMOL/L (ref 98–107)
CO2 SERPL-SCNC: 22 MMOL/L (ref 21–32)
CO2 SERPL-SCNC: 23 MMOL/L (ref 21–32)
CREAT SERPL-MCNC: 0.64 MG/DL (ref 0.5–1.05)
CREAT SERPL-MCNC: 0.66 MG/DL (ref 0.5–1.05)
EGFRCR SERPLBLD CKD-EPI 2021: >90 ML/MIN/1.73M*2
EGFRCR SERPLBLD CKD-EPI 2021: >90 ML/MIN/1.73M*2
EOSINOPHIL # BLD AUTO: 0.05 X10*3/UL (ref 0–0.7)
EOSINOPHIL NFR BLD AUTO: 0.5 %
ERYTHROCYTE [DISTWIDTH] IN BLOOD BY AUTOMATED COUNT: 13.8 % (ref 11.5–14.5)
ERYTHROCYTE [DISTWIDTH] IN BLOOD BY AUTOMATED COUNT: 13.9 % (ref 11.5–14.5)
GLUCOSE SERPL-MCNC: 107 MG/DL (ref 74–99)
GLUCOSE SERPL-MCNC: 112 MG/DL (ref 74–99)
HCT VFR BLD AUTO: 35.2 % (ref 36–46)
HCT VFR BLD AUTO: 36.9 % (ref 36–46)
HGB BLD-MCNC: 11.2 G/DL (ref 12–16)
HGB BLD-MCNC: 12 G/DL (ref 12–16)
IMM GRANULOCYTES # BLD AUTO: 0.05 X10*3/UL (ref 0–0.7)
IMM GRANULOCYTES NFR BLD AUTO: 0.5 % (ref 0–0.9)
LYMPHOCYTES # BLD AUTO: 1.67 X10*3/UL (ref 1.2–4.8)
LYMPHOCYTES NFR BLD AUTO: 15.1 %
MAGNESIUM SERPL-MCNC: 1.82 MG/DL (ref 1.6–2.4)
MCH RBC QN AUTO: 27.6 PG (ref 26–34)
MCH RBC QN AUTO: 27.7 PG (ref 26–34)
MCHC RBC AUTO-ENTMCNC: 31.8 G/DL (ref 32–36)
MCHC RBC AUTO-ENTMCNC: 32.5 G/DL (ref 32–36)
MCV RBC AUTO: 85 FL (ref 80–100)
MCV RBC AUTO: 87 FL (ref 80–100)
MONOCYTES # BLD AUTO: 0.75 X10*3/UL (ref 0.1–1)
MONOCYTES NFR BLD AUTO: 6.8 %
NEUTROPHILS # BLD AUTO: 8.48 X10*3/UL (ref 1.2–7.7)
NEUTROPHILS NFR BLD AUTO: 76.8 %
NRBC BLD-RTO: 0 /100 WBCS (ref 0–0)
NRBC BLD-RTO: 0 /100 WBCS (ref 0–0)
PLATELET # BLD AUTO: 366 X10*3/UL (ref 150–450)
PLATELET # BLD AUTO: 397 X10*3/UL (ref 150–450)
POTASSIUM SERPL-SCNC: 4 MMOL/L (ref 3.5–5.3)
POTASSIUM SERPL-SCNC: 4.3 MMOL/L (ref 3.5–5.3)
PROT SERPL-MCNC: 7.2 G/DL (ref 6.4–8.2)
RBC # BLD AUTO: 4.06 X10*6/UL (ref 4–5.2)
RBC # BLD AUTO: 4.33 X10*6/UL (ref 4–5.2)
SODIUM SERPL-SCNC: 134 MMOL/L (ref 136–145)
SODIUM SERPL-SCNC: 136 MMOL/L (ref 136–145)
TSH SERPL-ACNC: 2.53 MIU/L (ref 0.44–3.98)
WBC # BLD AUTO: 11 X10*3/UL (ref 4.4–11.3)
WBC # BLD AUTO: 11.9 X10*3/UL (ref 4.4–11.3)

## 2024-08-25 PROCEDURE — 84443 ASSAY THYROID STIM HORMONE: CPT

## 2024-08-25 PROCEDURE — 2500000004 HC RX 250 GENERAL PHARMACY W/ HCPCS (ALT 636 FOR OP/ED): Performed by: EMERGENCY MEDICINE

## 2024-08-25 PROCEDURE — 85027 COMPLETE CBC AUTOMATED: CPT

## 2024-08-25 PROCEDURE — 93010 ELECTROCARDIOGRAM REPORT: CPT | Performed by: INTERNAL MEDICINE

## 2024-08-25 PROCEDURE — 2500000004 HC RX 250 GENERAL PHARMACY W/ HCPCS (ALT 636 FOR OP/ED)

## 2024-08-25 PROCEDURE — 80048 BASIC METABOLIC PNL TOTAL CA: CPT | Mod: CCI

## 2024-08-25 PROCEDURE — 99291 CRITICAL CARE FIRST HOUR: CPT | Mod: 25 | Performed by: EMERGENCY MEDICINE

## 2024-08-25 PROCEDURE — 85025 COMPLETE CBC W/AUTO DIFF WBC: CPT | Performed by: EMERGENCY MEDICINE

## 2024-08-25 PROCEDURE — 99222 1ST HOSP IP/OBS MODERATE 55: CPT | Performed by: NURSE PRACTITIONER

## 2024-08-25 PROCEDURE — 84484 ASSAY OF TROPONIN QUANT: CPT | Performed by: EMERGENCY MEDICINE

## 2024-08-25 PROCEDURE — 36415 COLL VENOUS BLD VENIPUNCTURE: CPT | Performed by: EMERGENCY MEDICINE

## 2024-08-25 PROCEDURE — 96374 THER/PROPH/DIAG INJ IV PUSH: CPT

## 2024-08-25 PROCEDURE — 2500000002 HC RX 250 W HCPCS SELF ADMINISTERED DRUGS (ALT 637 FOR MEDICARE OP, ALT 636 FOR OP/ED)

## 2024-08-25 PROCEDURE — 2060000001 HC INTERMEDIATE ICU ROOM DAILY

## 2024-08-25 PROCEDURE — 71275 CT ANGIOGRAPHY CHEST: CPT | Performed by: STUDENT IN AN ORGANIZED HEALTH CARE EDUCATION/TRAINING PROGRAM

## 2024-08-25 PROCEDURE — 80053 COMPREHEN METABOLIC PANEL: CPT | Performed by: EMERGENCY MEDICINE

## 2024-08-25 PROCEDURE — 71275 CT ANGIOGRAPHY CHEST: CPT

## 2024-08-25 PROCEDURE — 93005 ELECTROCARDIOGRAM TRACING: CPT

## 2024-08-25 PROCEDURE — 2550000001 HC RX 255 CONTRASTS: Performed by: EMERGENCY MEDICINE

## 2024-08-25 PROCEDURE — 83735 ASSAY OF MAGNESIUM: CPT

## 2024-08-25 PROCEDURE — 96361 HYDRATE IV INFUSION ADD-ON: CPT

## 2024-08-25 RX ORDER — IPRATROPIUM BROMIDE AND ALBUTEROL SULFATE 2.5; .5 MG/3ML; MG/3ML
3 SOLUTION RESPIRATORY (INHALATION) EVERY 2 HOUR PRN
Status: DISCONTINUED | OUTPATIENT
Start: 2024-08-25 | End: 2024-08-29 | Stop reason: HOSPADM

## 2024-08-25 RX ORDER — ACETAMINOPHEN 650 MG/1
650 SUPPOSITORY RECTAL EVERY 4 HOURS PRN
Status: DISCONTINUED | OUTPATIENT
Start: 2024-08-25 | End: 2024-08-29 | Stop reason: HOSPADM

## 2024-08-25 RX ORDER — BENZONATATE 100 MG/1
100 CAPSULE ORAL 3 TIMES DAILY PRN
Status: DISCONTINUED | OUTPATIENT
Start: 2024-08-25 | End: 2024-08-29 | Stop reason: HOSPADM

## 2024-08-25 RX ORDER — POLYETHYLENE GLYCOL 3350 17 G/17G
17 POWDER, FOR SOLUTION ORAL DAILY
Status: DISCONTINUED | OUTPATIENT
Start: 2024-08-25 | End: 2024-08-29 | Stop reason: HOSPADM

## 2024-08-25 RX ORDER — AZITHROMYCIN 250 MG/1
500 TABLET, FILM COATED ORAL
Status: DISCONTINUED | OUTPATIENT
Start: 2024-08-25 | End: 2024-08-26

## 2024-08-25 RX ORDER — HYDROMORPHONE HYDROCHLORIDE 0.2 MG/ML
0.2 INJECTION INTRAMUSCULAR; INTRAVENOUS; SUBCUTANEOUS EVERY 4 HOURS PRN
Status: DISCONTINUED | OUTPATIENT
Start: 2024-08-25 | End: 2024-08-29 | Stop reason: HOSPADM

## 2024-08-25 RX ORDER — KETOROLAC TROMETHAMINE 30 MG/ML
15 INJECTION, SOLUTION INTRAMUSCULAR; INTRAVENOUS EVERY 6 HOURS PRN
Status: DISPENSED | OUTPATIENT
Start: 2024-08-25 | End: 2024-08-28

## 2024-08-25 RX ORDER — CEFTRIAXONE 1 G/50ML
1 INJECTION, SOLUTION INTRAVENOUS EVERY 24 HOURS
Status: DISCONTINUED | OUTPATIENT
Start: 2024-08-25 | End: 2024-08-29 | Stop reason: HOSPADM

## 2024-08-25 RX ORDER — HYDROMORPHONE HYDROCHLORIDE 0.2 MG/ML
0.2 INJECTION INTRAMUSCULAR; INTRAVENOUS; SUBCUTANEOUS ONCE
Status: COMPLETED | OUTPATIENT
Start: 2024-08-25 | End: 2024-08-25

## 2024-08-25 RX ORDER — ONDANSETRON HYDROCHLORIDE 2 MG/ML
4 INJECTION, SOLUTION INTRAVENOUS EVERY 6 HOURS PRN
Status: DISCONTINUED | OUTPATIENT
Start: 2024-08-25 | End: 2024-08-29 | Stop reason: HOSPADM

## 2024-08-25 RX ORDER — GUAIFENESIN 100 MG/5ML
200 SOLUTION ORAL EVERY 4 HOURS PRN
Status: DISCONTINUED | OUTPATIENT
Start: 2024-08-25 | End: 2024-08-29 | Stop reason: HOSPADM

## 2024-08-25 RX ORDER — ONDANSETRON HYDROCHLORIDE 2 MG/ML
4 INJECTION, SOLUTION INTRAVENOUS ONCE
Status: COMPLETED | OUTPATIENT
Start: 2024-08-25 | End: 2024-08-25

## 2024-08-25 RX ORDER — ACETAMINOPHEN 325 MG/1
650 TABLET ORAL EVERY 4 HOURS PRN
Status: DISCONTINUED | OUTPATIENT
Start: 2024-08-25 | End: 2024-08-29 | Stop reason: HOSPADM

## 2024-08-25 RX ORDER — ACETAMINOPHEN 160 MG/5ML
650 SOLUTION ORAL EVERY 4 HOURS PRN
Status: DISCONTINUED | OUTPATIENT
Start: 2024-08-25 | End: 2024-08-29 | Stop reason: HOSPADM

## 2024-08-25 RX ORDER — TALC
6 POWDER (GRAM) TOPICAL NIGHTLY PRN
Status: DISCONTINUED | OUTPATIENT
Start: 2024-08-25 | End: 2024-08-29 | Stop reason: HOSPADM

## 2024-08-25 RX ORDER — HEPARIN SODIUM 5000 [USP'U]/ML
5000 INJECTION, SOLUTION INTRAVENOUS; SUBCUTANEOUS EVERY 8 HOURS
Status: DISCONTINUED | OUTPATIENT
Start: 2024-08-25 | End: 2024-08-29 | Stop reason: HOSPADM

## 2024-08-25 RX ORDER — IBUPROFEN 400 MG/1
400 TABLET ORAL EVERY 8 HOURS PRN
Status: DISCONTINUED | OUTPATIENT
Start: 2024-08-25 | End: 2024-08-29 | Stop reason: HOSPADM

## 2024-08-25 RX ADMIN — ONDANSETRON 4 MG: 2 INJECTION INTRAMUSCULAR; INTRAVENOUS at 01:16

## 2024-08-25 RX ADMIN — SODIUM CHLORIDE 1000 ML: 9 INJECTION, SOLUTION INTRAVENOUS at 03:28

## 2024-08-25 RX ADMIN — KETOROLAC TROMETHAMINE 15 MG: 30 INJECTION, SOLUTION INTRAMUSCULAR at 14:40

## 2024-08-25 RX ADMIN — AZITHROMYCIN DIHYDRATE 500 MG: 250 TABLET ORAL at 08:34

## 2024-08-25 RX ADMIN — HYDROMORPHONE HYDROCHLORIDE 0.2 MG: 0.2 INJECTION, SOLUTION INTRAMUSCULAR; INTRAVENOUS; SUBCUTANEOUS at 00:52

## 2024-08-25 RX ADMIN — KETOROLAC TROMETHAMINE 15 MG: 30 INJECTION, SOLUTION INTRAMUSCULAR at 20:39

## 2024-08-25 RX ADMIN — IOHEXOL 75 ML: 350 INJECTION, SOLUTION INTRAVENOUS at 01:55

## 2024-08-25 RX ADMIN — POLYETHYLENE GLYCOL 3350 17 G: 17 POWDER, FOR SOLUTION ORAL at 08:36

## 2024-08-25 RX ADMIN — SODIUM CHLORIDE 500 ML: 9 INJECTION, SOLUTION INTRAVENOUS at 00:52

## 2024-08-25 RX ADMIN — CEFTRIAXONE SODIUM 1 G: 1 INJECTION, SOLUTION INTRAVENOUS at 06:03

## 2024-08-25 RX ADMIN — KETOROLAC TROMETHAMINE 15 MG: 30 INJECTION, SOLUTION INTRAMUSCULAR at 08:34

## 2024-08-25 SDOH — SOCIAL STABILITY: SOCIAL INSECURITY: HAS ANYONE EVER THREATENED TO HURT YOUR FAMILY OR YOUR PETS?: NO

## 2024-08-25 SDOH — SOCIAL STABILITY: SOCIAL INSECURITY: DOES ANYONE TRY TO KEEP YOU FROM HAVING/CONTACTING OTHER FRIENDS OR DOING THINGS OUTSIDE YOUR HOME?: NO

## 2024-08-25 SDOH — SOCIAL STABILITY: SOCIAL INSECURITY: DO YOU FEEL UNSAFE GOING BACK TO THE PLACE WHERE YOU ARE LIVING?: NO

## 2024-08-25 SDOH — SOCIAL STABILITY: SOCIAL INSECURITY: ABUSE: ADULT

## 2024-08-25 SDOH — SOCIAL STABILITY: SOCIAL INSECURITY: DO YOU FEEL ANYONE HAS EXPLOITED OR TAKEN ADVANTAGE OF YOU FINANCIALLY OR OF YOUR PERSONAL PROPERTY?: NO

## 2024-08-25 SDOH — SOCIAL STABILITY: SOCIAL INSECURITY: ARE YOU OR HAVE YOU BEEN THREATENED OR ABUSED PHYSICALLY, EMOTIONALLY, OR SEXUALLY BY ANYONE?: NO

## 2024-08-25 SDOH — SOCIAL STABILITY: SOCIAL INSECURITY: HAVE YOU HAD THOUGHTS OF HARMING ANYONE ELSE?: NO

## 2024-08-25 SDOH — SOCIAL STABILITY: SOCIAL INSECURITY: HAVE YOU HAD ANY THOUGHTS OF HARMING ANYONE ELSE?: NO

## 2024-08-25 SDOH — SOCIAL STABILITY: SOCIAL INSECURITY: WERE YOU ABLE TO COMPLETE ALL THE BEHAVIORAL HEALTH SCREENINGS?: YES

## 2024-08-25 SDOH — SOCIAL STABILITY: SOCIAL INSECURITY: ARE THERE ANY APPARENT SIGNS OF INJURIES/BEHAVIORS THAT COULD BE RELATED TO ABUSE/NEGLECT?: NO

## 2024-08-25 ASSESSMENT — COGNITIVE AND FUNCTIONAL STATUS - GENERAL
PATIENT BASELINE BEDBOUND: NO
DAILY ACTIVITIY SCORE: 24
DAILY ACTIVITIY SCORE: 24
MOBILITY SCORE: 24
MOBILITY SCORE: 24

## 2024-08-25 ASSESSMENT — LIFESTYLE VARIABLES
HOW MANY STANDARD DRINKS CONTAINING ALCOHOL DO YOU HAVE ON A TYPICAL DAY: PATIENT DOES NOT DRINK
AUDIT-C TOTAL SCORE: 0
EVER FELT BAD OR GUILTY ABOUT YOUR DRINKING: NO
HAVE PEOPLE ANNOYED YOU BY CRITICIZING YOUR DRINKING: NO
EVER HAD A DRINK FIRST THING IN THE MORNING TO STEADY YOUR NERVES TO GET RID OF A HANGOVER: NO
AUDIT-C TOTAL SCORE: 0
TOTAL SCORE: 0
SKIP TO QUESTIONS 9-10: 1
HOW OFTEN DO YOU HAVE 6 OR MORE DRINKS ON ONE OCCASION: NEVER
HOW OFTEN DO YOU HAVE A DRINK CONTAINING ALCOHOL: NEVER
HAVE YOU EVER FELT YOU SHOULD CUT DOWN ON YOUR DRINKING: NO

## 2024-08-25 ASSESSMENT — ENCOUNTER SYMPTOMS
LIGHT-HEADEDNESS: 0
IRREGULAR HEARTBEAT: 0
DIZZINESS: 0
SHORTNESS OF BREATH: 0
BLURRED VISION: 0
PALPITATIONS: 0
POOR WOUND HEALING: 0
MYALGIAS: 0
COUGH: 0
FREQUENCY: 0
ALTERED MENTAL STATUS: 0
BLOATING: 0
DECREASED APPETITE: 0
FOCAL WEAKNESS: 0
STIFFNESS: 0
NAUSEA: 0
ORTHOPNEA: 0
NAIL CHANGES: 0
VOMITING: 0
MUSCLE CRAMPS: 0
DOUBLE VISION: 0
WEAKNESS: 0
DYSPNEA ON EXERTION: 1

## 2024-08-25 ASSESSMENT — PAIN SCALES - GENERAL
PAINLEVEL_OUTOF10: 7
PAINLEVEL_OUTOF10: 8
PAINLEVEL_OUTOF10: 3
PAINLEVEL_OUTOF10: 9
PAINLEVEL_OUTOF10: 5 - MODERATE PAIN
PAINLEVEL_OUTOF10: 4
PAINLEVEL_OUTOF10: 5 - MODERATE PAIN
PAINLEVEL_OUTOF10: 0 - NO PAIN

## 2024-08-25 ASSESSMENT — PAIN DESCRIPTION - PAIN TYPE: TYPE: ACUTE PAIN

## 2024-08-25 ASSESSMENT — PAIN DESCRIPTION - DESCRIPTORS
DESCRIPTORS: DISCOMFORT
DESCRIPTORS: THROBBING;DISCOMFORT
DESCRIPTORS: THROBBING
DESCRIPTORS: THROBBING;PRESSURE

## 2024-08-25 ASSESSMENT — ACTIVITIES OF DAILY LIVING (ADL)
JUDGMENT_ADEQUATE_SAFELY_COMPLETE_DAILY_ACTIVITIES: YES
ADEQUATE_TO_COMPLETE_ADL: YES
DRESSING YOURSELF: INDEPENDENT
HEARING - RIGHT EAR: FUNCTIONAL
TOILETING: INDEPENDENT
HEARING - LEFT EAR: FUNCTIONAL
LACK_OF_TRANSPORTATION: NO
PATIENT'S MEMORY ADEQUATE TO SAFELY COMPLETE DAILY ACTIVITIES?: YES
BATHING: INDEPENDENT
WALKS IN HOME: INDEPENDENT
FEEDING YOURSELF: INDEPENDENT
GROOMING: INDEPENDENT

## 2024-08-25 ASSESSMENT — PAIN DESCRIPTION - ORIENTATION: ORIENTATION: MID

## 2024-08-25 ASSESSMENT — PAIN - FUNCTIONAL ASSESSMENT
PAIN_FUNCTIONAL_ASSESSMENT: 0-10

## 2024-08-25 ASSESSMENT — COLUMBIA-SUICIDE SEVERITY RATING SCALE - C-SSRS
6. HAVE YOU EVER DONE ANYTHING, STARTED TO DO ANYTHING, OR PREPARED TO DO ANYTHING TO END YOUR LIFE?: NO
2. HAVE YOU ACTUALLY HAD ANY THOUGHTS OF KILLING YOURSELF?: NO
1. IN THE PAST MONTH, HAVE YOU WISHED YOU WERE DEAD OR WISHED YOU COULD GO TO SLEEP AND NOT WAKE UP?: NO

## 2024-08-25 ASSESSMENT — PAIN DESCRIPTION - LOCATION
LOCATION: CHEST
LOCATION: CHEST

## 2024-08-25 NOTE — Clinical Note
Right thoracentesis performed , drained 1000cc sudha pleural fluid , catheter removed drg placed right back area, labs sent to lab .

## 2024-08-25 NOTE — PROGRESS NOTES
Internal Medicine Progress Note         Ayesha Nova is a 54 y.o. female on day 0 of admission presenting with Chest pain, unspecified type.    SUBJECTIVE    Patient seen and examined at bedside this morning.  Patient admits to being anxious about her condition and reports being scared to potentially have to go through another procedure for pericardial effusion.  Patient still admits to chest pain which flared up around 5 days ago.  Patient also admits to some shortness of breath and cough.  When speaking to patient about breast cancer treatment, she expresses willingness to explore options at this point.    OBJECTIVE    Vitals:    08/25/24 0700 08/25/24 0715 08/25/24 0726 08/25/24 0948   BP: 72/50 94/56     BP Location:  Left arm     Patient Position:  Sitting     Pulse: (!) 105 (!) 104     Resp: (!) 22 20     Temp:       TempSrc:       SpO2: 95% 95% 95%    Weight:    61.1 kg (134 lb 9.6 oz)   Height:          Results from last 7 days   Lab Units 08/25/24  0601 08/25/24  0058   WBC AUTO x10*3/uL 11.9* 11.0   HEMOGLOBIN g/dL 11.2* 12.0   HEMATOCRIT % 35.2* 36.9   PLATELETS AUTO x10*3/uL 366 397   NEUTROS PCT AUTO %  --  76.8   LYMPHS PCT AUTO %  --  15.1   MONOS PCT AUTO %  --  6.8   EOS PCT AUTO %  --  0.5     Results from last 7 days   Lab Units 08/25/24  0601 08/25/24  0058   SODIUM mmol/L 136 134*   POTASSIUM mmol/L 4.3 4.0   CHLORIDE mmol/L 105 99   CO2 mmol/L 22 23   BUN mg/dL 12 15   CREATININE mg/dL 0.64 0.66   CALCIUM mg/dL 8.9 9.9   PROTEIN TOTAL g/dL  --  7.2   BILIRUBIN TOTAL mg/dL  --  0.8   ALK PHOS U/L  --  45   ALT U/L  --  6*   AST U/L  --  15   GLUCOSE mg/dL 112* 107*       Scheduled Medications  azithromycin, 500 mg, oral, q24h CHRIS  cefTRIAXone, 1 g, intravenous, q24h  heparin (porcine), 5,000 Units, subcutaneous, q8h  perflutren lipid microspheres, 0.5-10 mL of dilution, intravenous, Once in imaging  perflutren protein A microsphere,  0.5 mL, intravenous, Once in imaging  polyethylene glycol, 17 g, oral, Daily  sulfur hexafluoride microsphr, 2 mL, intravenous, Once in imaging           Physical Exam:  General: Appears anxious.  Otherwise, pleasant and cooperative.   HEENT:  Normocephalic, atraumatic  Chest:  Crackling over right upper lung field.  CV:  Regular rate and rhythm. No murmurs. Tachycardia.  Abdomen: Abdomen is soft, non-tender, non-distended. BS +   Extremities:  No lower extremity edema or cyanosis.   Neurological:  AAOx3. No focal deficits.  Skin:  Warm and dry.               ASSESSMENT & PLAN  Ayesha Nova is a 54 y.o. female with a history of metastatic breast cancer, cardiac tamponade s/p pericardial window, and hypothyroidism who presented to the ED on 8/25 for chest pain that started a few hours prior to her arrival.     #Chest pain  #Recurrent pericardial effusion likely 2/2 metastatic right breast cancer status post lumpectomy in 2016  - Patient was admitted in May for pericardial effusion 2/2 cancer and had a pericardial window procedure performed.   - When speaking to patient about breast cancer treatment, she expresses willingness to explore options at this point despite declining treatment in the past.  Plan:  -Cardiothoracic surgery and cardiology consults were placed.  -EKG showed low voltages consistent with pericardial effusion, the patient's chest pain is unlikely to be related to other causes such as ACS.  -Patient's last echo was performed at the start of May and was notable for EF 55-60% and cardiac tamponade. Repeat TTE has been ordered.  -400 mg ibuprofen q8h PRN and 15 mg Toradol q6h PRN for pain control.  -NPO diet.   -telemetry  -Subcu heparin      #Right lung opacity, suspected pneumonia vs. metastatic lesion  #Right lobar pulmonary artery encasement 2/2 R upper lobe and hilar mass  -Current CT imaging showed an opacity in the right lung apex suggestive of pneumonia or a new metastatic lesion.   Plan:  -  rocephin 1g and azithromycin 500mg daily for possible pneumonia.   - Duonebs PRN .  - Recommend follow up outpatient with heme/onc.  - Ordered Tessalon Perles and Robitussin for patient's cough     #Worsening loculated moderate right pleural effusion  -Was found to have large pleural effusion 2/2 metastatic cancer s/p pericardial window on 5/2.  Chest tube was placed and fluid removed.  Chest tube removed on 5/3   -Current CT imaging shows recurrent right pleural effusion that is worse compared to previous imaging.   Plan:  -Consulted pulmonology for possible thoracentesis of right pleural effusion.  Recommendations on management are appreciated  -Patient not requiring oxygen.  Continue to monitor     #Hypotension  Plan:  - Patient arrived to ED hypotensive and tachycardic. 1.5 L of NS was administered, pressures remain soft but stable. Continue to monitor.     Chronic Medical conditions  #Hypothyroidism  Plan:  - Patient has hx of hypothyroidism but denies taking any medications outside of ibuprofen and her recent antibiotic regimen. TSH ordered.      DVT: Subcu heparin  Diet: NPO  Code: Full code  Consults: Cardiothoracic surgery, cardiology, pulmonology    Kalpesh Ferrer, DO   Please SecureChat for any further questions  This is a preliminary note, please await attending attestation for final A/P

## 2024-08-25 NOTE — H&P
Subjective/History     Ayesha Nova is a 54 y.o. female with a history of metastatic breast cancer, cardiac tamponade s/p pericardial window, and hypothyroidism who presented to the ED on 8/25 for chest pain that started a few hours prior to her arrival.     Patient presented for chest pain and sensation of tachycardia. She was admitted 5/1/2024 for chest tightness, SOB, and worsening fatigue. During that admission she was found to have a large pleural effusion, for which she underwent a pericardial window procedure. She says that since her pericardial window procedure in May, her pain never fully resolved. Over the last few days prior to her admission she noticed the chest pain worsening. Typically she would take ibuprofen to relieve the pain but it did not work this time. She also noticed an elevated heart rate accompanying the pain that was not present since her procedure in May. She had difficulty breathing due to a chronic cough and worsening of chest pain on deep inspiration. Her only change in medication recently was a 14 day course of doxycycline for suspected pneumonia detected on CXR. No other home meds.       ED Course: Patient vitals were notable for hypotension in 85-95 systolic range and tachycardia. Labs were unremarkable and troponin was flat. EKG was notable for sinus tachycardia with low voltage. CT angio of the chest showed an increase in pericardial effusion. Loculated right pleural effusion was also present, as well as central cavitations in the right lung apex that were suggestive of either pneumonia or a metastatic lesion. No pulmonary emboli were noted. The patient was given a 500mL NS bolus, followed by another 1L bolus. She was also given 0.2mg dilaudid and Zofran for pain management and nausea.     Surgical history: Pericardial window 05/02/2024  Family history: As below  Risk/Social factors: Remote history of tobacco abuse, no alcohol consumption    General: denies weight gain, denies loss  of appetite, fever, chills, night sweats. Reports fever on and off since May 2nd.   HEENT: denies headaches, dizziness, head trauma, visual changes, eye pain, tinnitus, nosebleeds, hoarseness or throat pain    Respiratory: denies dyspnea, cough and hemoptysis. Throbbing chest pain that worsens with inspiration and movement. 7/10 pain without dilaudid.   Cardiovascular: denies orthopnea, paroxysmal nocturnal dyspnea and leg swelling   Gastrointestinal: denies pain, nausea vomiting, diarrhea, constipation, melena or bleeding.  Genitourinary: denies hematuria, frequency, urgency or dysuria  Neurology: denies syncope, seizures, paralysis, paraesthesia   Endocrine: denies polyuria, polydipsia, skin or hair changes, and heat or cold intolerance  Musculoskeletal: denies joint pain, swelling, arthritis or myalgia  Hematologic: denies bleeding, adenopathy and easy bruising  Skin: denies rashes and skin discoloration  Psychiatry: denies depression     Past Medical History:   Diagnosis Date    Personal history of malignant neoplasm of breast     History of malignant neoplasm of breast       Past Surgical History:   Procedure Laterality Date    HYSTERECTOMY  02/29/2016    Hysterectomy    OTHER SURGICAL HISTORY  04/20/2022    Breast reduction    OTHER SURGICAL HISTORY  04/20/2022    Lumpectomy    OTHER SURGICAL HISTORY  04/20/2022    Lung wedge resection       Family history:family history includes Dementia in her mother; Heart attack in her father; cabg in her father.    Objective     Physical Exam:  General:  Pleasant and cooperative. No apparent distress.  HEENT:  Normocephalic, atraumatic  Chest:  Crackling over right upper lung field.  CV:  Regular rate and rhythm. No murmurs. Tachycardia.  Abdomen: Abdomen is soft, non-tender, non-distended. BS +   Extremities:  No lower extremity edema or cyanosis.   Neurological:  AAOx3. No focal deficits.  Skin:  Warm and dry.    Last Recorded Vitals  Blood pressure 87/50, pulse (!)  "107, temperature 36.6 °C (97.9 °F), temperature source Temporal, resp. rate 20, height 1.575 m (5' 2\"), weight 59 kg (130 lb), SpO2 94%.  Intake/Output last 3 Shifts:  No intake/output data recorded.    Last CBC & BMP  Lab Results   Component Value Date    GLUCOSE 107 (H) 08/25/2024    CALCIUM 9.9 08/25/2024     (L) 08/25/2024    K 4.0 08/25/2024    CO2 23 08/25/2024    CL 99 08/25/2024    BUN 15 08/25/2024    CREATININE 0.66 08/25/2024     Lab Results   Component Value Date    WBC 11.0 08/25/2024    HGB 12.0 08/25/2024    HCT 36.9 08/25/2024    MCV 85 08/25/2024     08/25/2024         Assessment / Plan     Acute Medical conditions    #Recurrent pericardial effusion likely 2/2 metastatic breast cancer  Plan:  - Patient was admitted in May for pericardial effusion 2/2 cancer and had a pericardial window procedure performed. She presents similarly on this admission. Cardiothoracic surgery and cardiology consults were placed regarding the management of the pericardial effusion.  - EKG showed low voltages consistent with pericardial effusion, the patient's chest pain is unlikely to be related to other causes such as ACS.  - Patient's last echo was performed at the start of May and was notable for EF 55-60% and cardiac tamponade. Repeat TTE has been ordered.  - Kidney function can tolerate ibuprofen and/or colchicine, 400mg ibuprofen q8h PRN ordered.  - NPO diet started. Patient is to be transferred to the heart center for further management, placed on telemetry. Subcu heparin started.    #Right lung opacity, suspected pneumonia vs. metastatic lesion  #Right lobar pulmonary artery encasement 2/2 R upper lobe and hilar mass  Plan:  - Patient has almost finished a 14 day course of doxycycline for suspected pneumonia found on CXR. Current CT imaging showed an opacity in the right lung apex suggestive of pneumonia or a new metastatic lesion. Started rocephin 1g and azithromycin 500mg daily.   - Duonebs PRN " ordered.  - Recommend follow up outpatient with heme/onc.    #Worsening loculated moderate right pleural effusion  Plan:  - Patient has a worsening pleural effusion compared to previous imaging. On her last admission she had a chest tube placed which removed serosanguinous fluid. She does not currently require oxygen. Continue to monitor.     #Hypotension  Plan:  - Patient arrived to ED hypotensive and tachycardic. 1.5 L of NS was administered, pressures remain soft but stable. Continue to monitor.    Chronic Medical conditions  #Hypothyroidism  Plan:  - Patient has hx of hypothyroidism but denies taking any medications outside of ibuprofen and her recent antibiotic regimen. TSH ordered.     DVT: Subcu heparin  Diet: NPO  Code:   Consults: Cardiothoracic surgery, cardiology    Care Planning/PT-OT:       Mega Vergara MD   PGY-1, Internal Medicine  This is a preliminary note, please await attending attestation for final A/P

## 2024-08-25 NOTE — SIGNIFICANT EVENT
Patient is 54-year-old female with history of smoking, obesity, VATS assisted left lower lobe wedge resection, recurrent metastatic invasive ductal breast carcinoma with mets to lungs (originally diagnosed in 2016, triple positive), hypothyroidism, endometriosis status post hysterectomy, history of cardiac tamponade secondary to malignant pericardial effusion.    Of note, patient presented last May with chest tightness, shortness of breath, worsening fatigue.  Was found to have large pleural effusion secondary to metastatic cancer status post pericardial window on 5/2 with chest tube removed on 5/3.  Patient improved with incentive spirometer and flutter valve and was seen by palliative care during last hospitalization.  Cardiothoracic surgery cleared patient for discharge.  Patient also reportedly had hilar mass and right lung that encases her pulmonary artery, had postobstructive pneumonia, had pericardial tamponade status post pericardial window approximately 1-2 month ago    Patient presented with chest pain that started a few hours prior to arrival.  Patient also endorses shortness of breath.  Patient has had no treatment in 8 years since her cancer stage IV and there is no current options.  Patient was being treated with doxycycline for pneumonia of right lung    In the ED patient's vitals were notable for mild tachycardia with low blood pressures in the 85-95 systolic range.  BMP was unremarkable.  Patient's CBC was also unremarkable.  Patient's troponins were both flat.  Patient underwent CTA.  CTA revealed: 1.  Encasement and narrowing of right lobar pulmonary artery branches similar to prior.  2.  Interval increase in pericardial effusion which is now moderate to large measuring 1.8.  And 3.  Interval increase in loculated right pleural effusion which is now moderate with interval development of consolidative opacity within central accumulations in the right lung apex which may represent pneumonia or new  metastatic lesions    Assessment plan:    #Worsening right lobe pneumonia artery encasement  #Worsening 1.8 cm pericardial effusion (now moderate to large)  #Worsening loculated moderate right pleural effusion concerning for possible pneumonia versus metastasis  #Chest pain and shortness of breath likely secondary to cardiac tamponade from malignant effusion  #Soft blood pressures  Plan:   -Will treat with Rocephin/azithromycin  -Ordered DuoNebs as needed  -Patient is not requiring oxygen and patient symptoms are likely secondary to effusion more so than pneumonia but will treat given ambiguity of CT findings  -No evidence for ACS, EKG revealed low voltage findings consistent with pericardial effusion  -Patient already received 1.5 units to assist with increasing heart rate 1 AM and maintaining preload  -Blood pressure is holding steady  -Consult cardiology/CT surgery  -Ordered echo  -Patient will likely benefit from heme/oncology follow-up during/after hospitalization for continuation of care  -Kidney function can tolerate either ibuprofen or colchicine  - order TSH

## 2024-08-25 NOTE — ED PROVIDER NOTES
HPI   Chief Complaint   Patient presents with    Chest Pain       Patient presents with chest pain that started few hours prior to arrival.  Patient has a history of metastatic breast cancer.  She has a hilar mass in the right lung that encases her pulmonary artery.  She is had postobstructive pneumonia she has had a pericardial tamponade status post pericardial window approximately a month ago.              Patient History   Past Medical History:   Diagnosis Date    Personal history of malignant neoplasm of breast     History of malignant neoplasm of breast     Past Surgical History:   Procedure Laterality Date    HYSTERECTOMY  2016    Hysterectomy    OTHER SURGICAL HISTORY  2022    Breast reduction    OTHER SURGICAL HISTORY  2022    Lumpectomy    OTHER SURGICAL HISTORY  2022    Lung wedge resection     Family History   Problem Relation Name Age of Onset    Dementia Mother      Heart attack Father      Other (cabg) Father       Social History     Tobacco Use    Smoking status: Former     Current packs/day: 0.00     Types: Cigarettes     Quit date:      Years since quittin.6    Smokeless tobacco: Never   Substance Use Topics    Alcohol use: Never    Drug use: Never       Physical Exam   ED Triage Vitals [24 0014]   Temperature Heart Rate Respirations BP   36.6 °C (97.9 °F) (!) 108 18 98/57      Pulse Ox Temp Source Heart Rate Source Patient Position   97 % Temporal Monitor --      BP Location FiO2 (%)     -- --       Physical Exam  Vitals and nursing note reviewed.   Constitutional:       General: She is not in acute distress.     Appearance: She is well-developed.   HENT:      Head: Normocephalic and atraumatic.   Eyes:      Conjunctiva/sclera: Conjunctivae normal.   Cardiovascular:      Rate and Rhythm: Normal rate and regular rhythm.      Heart sounds: No murmur heard.  Pulmonary:      Effort: Pulmonary effort is normal. No respiratory distress.      Breath sounds: Normal  breath sounds.   Abdominal:      Palpations: Abdomen is soft.      Tenderness: There is no abdominal tenderness.   Musculoskeletal:         General: No swelling.      Cervical back: Neck supple.   Skin:     General: Skin is warm and dry.      Capillary Refill: Capillary refill takes less than 2 seconds.   Neurological:      Mental Status: She is alert.   Psychiatric:         Mood and Affect: Mood normal.           ED Course & MDM   Diagnoses as of 08/25/24 0415   Chest pain, unspecified type   Pericardial effusion ([facility])   Pleural effusion                 No data recorded                                 Medical Decision Making  Differential diagnosis pleural effusion, pericardial effusion, pneumonia, etc.    Patient's had low blood pressures with tachycardia.  Patient has jugular venous distention on physical exam.  Patient with pericardial window 3-1/2 months ago.  Patient has large pericardial effusion on CT and there is a moderate pleural effusion.  Concern clinically for possible recurrent pericardial tamponade although less likely given pericardial window.  Patient will need close observation until echocardiogram.  Discussed case with Dr. Palacios and Dr. Arrieta        Procedure  Critical Care    Performed by: Jad Queen MD  Authorized by: Jad Queen MD    Critical care provider statement:     Critical care time (minutes):  35    Critical care time was exclusive of:  Separately billable procedures and treating other patients    Critical care was necessary to treat or prevent imminent or life-threatening deterioration of the following conditions:  Circulatory failure    Critical care was time spent personally by me on the following activities:  Ordering and performing treatments and interventions, ordering and review of laboratory studies, ordering and review of radiographic studies, pulse oximetry, re-evaluation of patient's condition and review of old charts    Care discussed with: admitting provider          Jad Queen MD  08/25/24 0413

## 2024-08-25 NOTE — CONSULTS
Inpatient consult to Cardiothoracic Surgery  Consult performed by: LEANDER Ch-CNP  Consult ordered by: Bob Quiñonez MD  Reason for consult: Malignant Pericardial Effusion    History Of Present Illness  Ayesha Nova is a 54 y.o. female with a PMH significant for longstanding recurrent metastatic breast cancer (initial dx 2016 - ductal carcinoma [G3, ER +, HER2/della amplified] with metastatic lesions to bilateral lungs - has declined treatment), LLL Lung Nodule (S/p surgical resection in 2018), Malignant Pericardial Effusion (S/p pericardial window 5/2/24) who presents to [facility] [place] on 8/25/2024 with complaints of chest discomfort and tachycardia.     ED Course: Patient vitals were notable for hypotension in 85-95 systolic range and tachycardia. Labs were unremarkable and troponin was flat. EKG was notable for sinus tachycardia with low voltage. CT angio of the chest showed an increase in pericardial effusion. Loculated right pleural effusion was also present, as well as central cavitations in the right lung apex that were suggestive of either pneumonia or a metastatic lesion. No pulmonary emboli were noted. The patient was given a 500mL NS bolus, followed by another 1L bolus. She was also given 0.2mg dilaudid and Zofran for pain management and nausea.     Given the patients CT imaging revealing a Right pleural effusion and a pericardial effusion, cardiac surgery was consulted for evaluation & management.     Subjective   Past Medical History  She has a past medical history of Personal history of malignant neoplasm of breast.    Surgical History  She has a past surgical history that includes Hysterectomy (02/29/2016); Other surgical history (04/20/2022); Other surgical history (04/20/2022); and Other surgical history (04/20/2022).     Social History  She reports that she quit smoking about 4 years ago. Her smoking use included cigarettes. She has never used smokeless tobacco.  She reports that she does not drink alcohol and does not use drugs.    Family History  Family History   Problem Relation Name Age of Onset    Dementia Mother      Heart attack Father      Other (cabg) Father          Allergies  Vancomycin, Morphine, and Sulfa (sulfonamide antibiotics)    Review of Systems  Review of Systems   Constitutional: Negative for decreased appetite and malaise/fatigue.   HENT:  Negative for congestion.    Eyes:  Negative for blurred vision and double vision.   Cardiovascular:  Positive for dyspnea on exertion. Negative for chest pain, irregular heartbeat, leg swelling, orthopnea and palpitations.   Respiratory:  Negative for cough and shortness of breath.    Endocrine: Negative for cold intolerance and heat intolerance.   Skin:  Negative for nail changes, poor wound healing and rash.   Musculoskeletal:  Negative for arthritis, muscle cramps, muscle weakness, myalgias and stiffness.   Gastrointestinal:  Negative for bloating, nausea and vomiting.   Genitourinary:  Negative for frequency, hesitancy and urgency.   Neurological:  Negative for dizziness, focal weakness, light-headedness and weakness.   Psychiatric/Behavioral:  Negative for altered mental status.    Allergic/Immunologic: Negative for environmental allergies.           Objective   Physical Exam  Physical Exam  Vitals and nursing note reviewed.   Constitutional:       General: She is not in acute distress.     Appearance: Normal appearance. She is not ill-appearing.   HENT:      Head: Normocephalic and atraumatic.      Nose: Nose normal.      Mouth/Throat:      Mouth: Mucous membranes are moist.      Pharynx: Oropharynx is clear.   Eyes:      Extraocular Movements: Extraocular movements intact.      Conjunctiva/sclera: Conjunctivae normal.      Pupils: Pupils are equal, round, and reactive to light.   Cardiovascular:      Rate and Rhythm: Normal rate and regular rhythm.      Pulses: Normal pulses.      Heart sounds: Normal heart  sounds, S1 normal and S2 normal. No murmur heard.     No systolic murmur is present.      No friction rub. No gallop.   Pulmonary:      Effort: Pulmonary effort is normal.      Breath sounds: Normal breath sounds.   Abdominal:      General: Abdomen is flat. Bowel sounds are normal. There is no distension.      Palpations: Abdomen is soft.   Musculoskeletal:         General: Normal range of motion.      Cervical back: Normal range of motion and neck supple.      Right lower leg: No edema.      Left lower leg: No edema.   Skin:     General: Skin is warm and dry.      Capillary Refill: Capillary refill takes less than 2 seconds.      Findings: No lesion.      Nails: There is no clubbing.   Neurological:      General: No focal deficit present.      Mental Status: She is alert and oriented to person, place, and time. Mental status is at baseline.      Cranial Nerves: Cranial nerves 2-12 are intact.      Sensory: Sensation is intact.      Motor: Motor function is intact.   Psychiatric:         Attention and Perception: Attention and perception normal.         Mood and Affect: Mood normal.         Speech: Speech normal.         Behavior: Behavior normal.         Thought Content: Thought content normal.         Cognition and Memory: Cognition and memory normal.         Judgment: Judgment normal.          Last Recorded Vitals  BP 94/56 (BP Location: Left arm, Patient Position: Sitting)   Pulse (!) 104   Temp 37.6 °C (99.7 °F) Comment: pt refusing tylenol at this time  Resp 20   Wt 61.1 kg (134 lb 9.6 oz)   SpO2 95%     Relevant Results  Last Labs:  CBC - 8/25/2024:  6:01 AM  11.9 11.2 366    35.2      CMP - 8/25/2024:  6:01 AM  8.9 7.2 15 --- 0.8   2.6 3.9 6 45      PTT - 5/2/2024: 11:26 AM  1.3   14.4 29     Troponin I, High Sensitivity   Date/Time Value Ref Range Status   08/25/2024 01:58 AM 4 0 - 13 ng/L Final   08/25/2024 12:58 AM 4 0 - 13 ng/L Final   06/03/2024 03:44 PM <3 0 - 13 ng/L Final     BNP   Date/Time  Value Ref Range Status   05/01/2024 07:24 PM 21 0 - 99 pg/mL Final   03/07/2024 01:03 PM 35 0 - 99 pg/mL Final     VLDL   Date/Time Value Ref Range Status   08/23/2019 08:50 AM 13 0 - 40 mg/dL Final      Last I/O:  I/O last 3 completed shifts:  In: 1000 (17 mL/kg) [IV Piggyback:1000]  Out: - (0 mL/kg)   Weight: 59 kg     Cardiology Tests    Echo:  Transthoracic echo (TTE) limited 05/06/2024  PHYSICIAN INTERPRETATION:  Left Ventricle: The left ventricular systolic function is normal, with an estimated ejection fraction of 60%. There are no regional wall motion abnormalities. The left ventricular cavity size is normal. Spectral Doppler shows a normal pattern of left ventricular diastolic filling.  Left Atrium: The left atrium is normal in size.  Right Ventricle: The right ventricle is normal in size. There is normal right ventricular global systolic function.  Right Atrium: The right atrium is normal in size.  Aortic Valve: The aortic valve appears structurally normal. There is no evidence of aortic valve regurgitation. The peak instantaneous gradient of the aortic valve is 6.3 mmHg. The mean gradient of the aortic valve is 3.6 mmHg.  Mitral Valve: The mitral valve is normal in structure. There is no evidence of mitral valve regurgitation.  Tricuspid Valve: The tricuspid valve is structurally normal. No evidence of tricuspid regurgitation.  Pulmonic Valve: The pulmonic valve is structurally normal. There is no indication of pulmonic valve regurgitation.  Pericardium: There is a small pericardial effusion anterior to the right ventricle.  Aorta: The aortic root is normal.        CONCLUSIONS:   1. Left ventricular systolic function is normal with a 60% estimated ejection fraction.   2. Small pericardial effusion located anterior to the right ventricle.    Transthoracic Echo (TTE) Complete 05/02/2024  PHYSICIAN INTERPRETATION:  Left Ventricle: The left ventricular systolic function is normal, with an estimated ejection  fraction of 55-60%. There are no regional wall motion abnormalities. The left ventricular cavity size is normal. Left ventricular diastolic filling was not assessed.  Left Atrium: The left atrium was not assessed.  Right Ventricle: The right ventricle was not assessed. Right ventricular systolic function not assessed.  Right Atrium: The right atrium was not assessed.  Aortic Valve: The aortic valve was not assessed. Aortic valve regurgitation was not assessed.  Mitral Valve: The mitral valve was not assessed. Mitral valve regurgitation was not assessed.  Tricuspid Valve: The tricuspid valve was not assessed. Tricuspid regurgitation was not assessed.  Pulmonic Valve: The pulmonic valve was not assessed. Pulmonic valve regurgitation was not assessed.  Pericardium: There is a large pericardial effusion. There is right ventricular diastolic collapse, is brief right atrial diastolic collapse and is inferior vena caval plethora. These findings are consistent with cardiac tamponade.  Aorta: The aortic root was not assessed.  Systemic Veins: The inferior vena cava appears mildly dilated.        CONCLUSIONS:   1. Left ventricular systolic function is normal with a 55-60% estimated ejection fraction.   2. Echocardiographic findings are consistent with cardiac tamponade.   3. There is a large pericardial effusion.    Transthoracic echo (TTE) limited 04/15/2024  PHYSICIAN INTERPRETATION:  Left Ventricle: The left ventricular systolic function is normal, with an estimated ejection fraction of 65%. There are no regional wall motion abnormalities. The left ventricular cavity size is normal. Left ventricular diastolic filling was not assessed.  Left Atrium: The left atrium is normal in size.  Right Ventricle: The right ventricle is normal in size. There is normal right ventricular global systolic function.  Right Atrium: The right atrium is normal in size. Mild right atrial collapse.  Aortic Valve: The aortic valve appears  structurally normal. There is no evidence of aortic valve regurgitation.  Mitral Valve: The mitral valve is normal in structure. There is no evidence of mitral valve regurgitation.  Tricuspid Valve: The tricuspid valve is structurally normal. There is trace tricuspid regurgitation. The Doppler estimated RVSP is within normal limits at 16.1 mmHg.  Pulmonic Valve: The pulmonic valve is not well visualized. The pulmonic valve regurgitation was not well visualized.  Pericardium: There is a large pericardial effusion.  Aorta: The aortic root is normal.        CONCLUSIONS:   1. Left ventricular systolic function is normal with a 65% estimated ejection fraction.   2. Mild right atrial collapse.   3. There is a large pericardial effusion.   4. RVSP within normal limits.   5. In comparisn to the previous study of 3/15/24, there has been an increase in size of the pericardial effusion.    Onco-Echo Complete (Strain And 3D) 03/15/2024  PHYSICIAN INTERPRETATION:  Left Ventricle: The left ventricular systolic function is hyperdynamic, with an estimated ejection fraction of 65-70%. There are no regional wall motion abnormalities. The left ventricular cavity size is normal. Spectral Doppler shows a normal pattern of left ventricular diastolic filling.  Left Atrium: The left atrium is normal in size.  Right Ventricle: The right ventricle is normal in size. There is normal right ventricular global systolic function.  Right Atrium: The right atrium is normal in size.  Aortic Valve: The aortic valve appears structurally normal. There is no evidence of aortic valve regurgitation. The peak instantaneous gradient of the aortic valve is 3.8 mmHg. The mean gradient of the aortic valve is 2.2 mmHg.  Mitral Valve: The mitral valve is normal in structure. There is no evidence of mitral valve regurgitation.  Tricuspid Valve: The tricuspid valve is structurally normal. No evidence of tricuspid regurgitation.  Pulmonic Valve: The pulmonic valve  is structurally normal. There is no indication of pulmonic valve regurgitation.  Pericardium: There is a moderately sized pericardial effusion. The effusion is circumferential.  Aorta: The aortic root is normal.  Systemic Veins: The inferior vena cava was not well visualized.     ONCO-CARDIOLOGY:  Vital Signs: The patients heart rate during the study was 75 beats per minute.  The patients blood pressure was 102/56 during the study.  Machine: This study was performed on the [employer].  Previous Study: The patient had a previous Onco-Cardiology echocardiogram dated  5/31/2023. The patient's previous study was performed on the [employer] E-9.        Onco-Cardiology Measurements:  Historical Measurements from Previous Study  2D EF (Biplane)                     63%  Global Longitudinal Strain (GLS) -22.3%     Current Measurements  2D EF (Biplane)                     64%  Global Longitudinal Strain (GLS) -20.6%     GLS Tracking Quality: Fair        CONCLUSIONS:   1. Left ventricular systolic function is hyperdynamic with a 65-70% estimated ejection fraction.   2. There is a moderate pericardial effusion.   3. The pericardial effusion is new in comparison to the study of 5/31/23.   4. Global longitudinal strain is normal at -20%.   5. No hemodynamic evidence of pericardial tamponade.    Ejection Fractions:  EF   Date/Time Value Ref Range Status   03/15/2024 12:43 PM 50 %             Assessment & Plan      Malignant Pericardial Effusion   - Secondary to underlying metastatic breast cancer   - S/p Urgent Pericardial Window on 5/2/24 with Cardiac Surgeon, Dr. Saad Giles   - Patient presented to the ED with tachycardia and dyspnea on 8/25  --> CT chest showing fluid around the pericardium   - Stat ECHO pending  - Patient is with tachycardia (HR 100s) however no hypotension        Malignant Pleural Effusion   - Right loculated effusion appreciated on 8/25 CT chest  - Current O2 Requirements: RA  - Plan for Inpatient thoracentesis  -->  May require pleurx placement if effusion recurs   - Follow CXRs    Above patient and plan discussed with cardiac surgeon, Dr. Sami Palacios. Plan as above. Will continue to follow along.          Jason Ellison, APRN-CNP

## 2024-08-25 NOTE — ED TRIAGE NOTES
Patient arrives via EMS from home with complaints of chest pain. Patient has been having chest pain x 2 days. Patient states the pain is mid sternal radiating to left chest. Patient describes the pain as discomfort. Patient also endorses associated shortness of breath. Patient has history of stage 4 breast cancer with mets to lungs. Patient has no had treatment in 8 years since her cancer is stage 4 and currently no treatment options. Patients last scan on chest was May 2024. Patient is also being treated by Doxycycline for pneumonia of right lung.

## 2024-08-26 ENCOUNTER — APPOINTMENT (OUTPATIENT)
Dept: RADIOLOGY | Facility: HOSPITAL | Age: 54
DRG: 163 | End: 2024-08-26
Payer: COMMERCIAL

## 2024-08-26 ENCOUNTER — APPOINTMENT (OUTPATIENT)
Dept: CARDIOLOGY | Facility: HOSPITAL | Age: 54
End: 2024-08-26
Payer: COMMERCIAL

## 2024-08-26 ENCOUNTER — APPOINTMENT (OUTPATIENT)
Dept: CARDIOLOGY | Facility: HOSPITAL | Age: 54
DRG: 163 | End: 2024-08-26
Payer: COMMERCIAL

## 2024-08-26 VITALS
SYSTOLIC BLOOD PRESSURE: 108 MMHG | HEIGHT: 62 IN | TEMPERATURE: 99.9 F | RESPIRATION RATE: 18 BRPM | OXYGEN SATURATION: 99 % | WEIGHT: 134.6 LBS | HEART RATE: 116 BPM | BODY MASS INDEX: 24.77 KG/M2 | DIASTOLIC BLOOD PRESSURE: 67 MMHG

## 2024-08-26 PROBLEM — I31.39 PERICARDIAL EFFUSION (HHS-HCC): Status: ACTIVE | Noted: 2024-08-25

## 2024-08-26 PROBLEM — I31.31: Status: ACTIVE | Noted: 2024-08-25

## 2024-08-26 LAB
ABO GROUP (TYPE) IN BLOOD: NORMAL
ALBUMIN SERPL BCP-MCNC: 3.3 G/DL (ref 3.4–5)
AMYLASE FLD-CCNC: 32 U/L
ANION GAP SERPL CALC-SCNC: 12 MMOL/L (ref 10–20)
ANTIBODY SCREEN: NORMAL
BASOPHILS NFR FLD MANUAL: 0 %
BLASTS NFR FLD MANUAL: 0 %
BUN SERPL-MCNC: 13 MG/DL (ref 6–23)
CALCIUM SERPL-MCNC: 9.3 MG/DL (ref 8.6–10.3)
CHLORIDE SERPL-SCNC: 103 MMOL/L (ref 98–107)
CLARITY FLD: ABNORMAL
CO2 SERPL-SCNC: 25 MMOL/L (ref 21–32)
COLOR FLD: ABNORMAL
CREAT SERPL-MCNC: 0.81 MG/DL (ref 0.5–1.05)
EGFRCR SERPLBLD CKD-EPI 2021: 85 ML/MIN/1.73M*2
EJECTION FRACTION APICAL 4 CHAMBER: 71.5
EJECTION FRACTION: 63 %
EOSINOPHIL NFR FLD MANUAL: 0 %
ERYTHROCYTE [DISTWIDTH] IN BLOOD BY AUTOMATED COUNT: 14.3 % (ref 11.5–14.5)
GLUCOSE FLD-MCNC: 78 MG/DL
GLUCOSE SERPL-MCNC: 96 MG/DL (ref 74–99)
HCT VFR BLD AUTO: 35.5 % (ref 36–46)
HGB BLD-MCNC: 11.5 G/DL (ref 12–16)
HOLD SPECIMEN: NORMAL
IMMATURE GRANULOCYTES IN FLUID: 0 %
LDH FLD L TO P-CCNC: 240 U/L
LEFT VENTRICLE INTERNAL DIMENSION DIASTOLE: 3.2 CM (ref 3.5–6)
LYMPHOCYTES NFR FLD MANUAL: 80 %
MAGNESIUM SERPL-MCNC: 1.92 MG/DL (ref 1.6–2.4)
MCH RBC QN AUTO: 28 PG (ref 26–34)
MCHC RBC AUTO-ENTMCNC: 32.4 G/DL (ref 32–36)
MCV RBC AUTO: 86 FL (ref 80–100)
MONOS+MACROS NFR FLD MANUAL: 0 %
NEUTROPHILS NFR FLD MANUAL: 20 %
NRBC BLD-RTO: 0 /100 WBCS (ref 0–0)
OTHER CELLS NFR FLD MANUAL: 0 %
PH FLD: 8.02 [PH]
PHOSPHATE SERPL-MCNC: 2.9 MG/DL (ref 2.5–4.9)
PLASMA CELLS NFR FLD MANUAL: 0 %
PLATELET # BLD AUTO: 365 X10*3/UL (ref 150–450)
POTASSIUM SERPL-SCNC: 5.1 MMOL/L (ref 3.5–5.3)
PROT FLD-MCNC: 4.6 G/DL
RBC # BLD AUTO: 4.11 X10*6/UL (ref 4–5.2)
RBC # FLD AUTO: ABNORMAL /UL
RH FACTOR (ANTIGEN D): NORMAL
SODIUM SERPL-SCNC: 135 MMOL/L (ref 136–145)
TOTAL CELLS COUNTED FLD: 100
TRIGL FLD-MCNC: 34 MG/DL
WBC # BLD AUTO: 12.8 X10*3/UL (ref 4.4–11.3)
WBC # FLD AUTO: 7046 /UL

## 2024-08-26 PROCEDURE — 93308 TTE F-UP OR LMTD: CPT | Performed by: INTERNAL MEDICINE

## 2024-08-26 PROCEDURE — 32555 ASPIRATE PLEURA W/ IMAGING: CPT

## 2024-08-26 PROCEDURE — 82945 GLUCOSE OTHER FLUID: CPT | Mod: PARLAB | Performed by: NURSE PRACTITIONER

## 2024-08-26 PROCEDURE — 2060000001 HC INTERMEDIATE ICU ROOM DAILY

## 2024-08-26 PROCEDURE — 88341 IMHCHEM/IMCYTCHM EA ADD ANTB: CPT | Performed by: PATHOLOGY

## 2024-08-26 PROCEDURE — 83986 ASSAY PH BODY FLUID NOS: CPT | Mod: PARLAB | Performed by: NURSE PRACTITIONER

## 2024-08-26 PROCEDURE — 71045 X-RAY EXAM CHEST 1 VIEW: CPT

## 2024-08-26 PROCEDURE — 84478 ASSAY OF TRIGLYCERIDES: CPT | Mod: PARLAB | Performed by: NURSE PRACTITIONER

## 2024-08-26 PROCEDURE — 2500000004 HC RX 250 GENERAL PHARMACY W/ HCPCS (ALT 636 FOR OP/ED)

## 2024-08-26 PROCEDURE — 93308 TTE F-UP OR LMTD: CPT

## 2024-08-26 PROCEDURE — 93010 ELECTROCARDIOGRAM REPORT: CPT | Performed by: INTERNAL MEDICINE

## 2024-08-26 PROCEDURE — 93321 DOPPLER ECHO F-UP/LMTD STD: CPT | Performed by: INTERNAL MEDICINE

## 2024-08-26 PROCEDURE — 88112 CYTOPATH CELL ENHANCE TECH: CPT | Mod: TC | Performed by: NURSE PRACTITIONER

## 2024-08-26 PROCEDURE — 93321 DOPPLER ECHO F-UP/LMTD STD: CPT

## 2024-08-26 PROCEDURE — 82150 ASSAY OF AMYLASE: CPT | Mod: PARLAB | Performed by: NURSE PRACTITIONER

## 2024-08-26 PROCEDURE — 87116 MYCOBACTERIA CULTURE: CPT | Mod: PARLAB | Performed by: NURSE PRACTITIONER

## 2024-08-26 PROCEDURE — 83735 ASSAY OF MAGNESIUM: CPT

## 2024-08-26 PROCEDURE — 36415 COLL VENOUS BLD VENIPUNCTURE: CPT | Performed by: NURSE PRACTITIONER

## 2024-08-26 PROCEDURE — 99233 SBSQ HOSP IP/OBS HIGH 50: CPT | Performed by: NURSE PRACTITIONER

## 2024-08-26 PROCEDURE — 85027 COMPLETE CBC AUTOMATED: CPT

## 2024-08-26 PROCEDURE — 86901 BLOOD TYPING SEROLOGIC RH(D): CPT | Performed by: NURSE PRACTITIONER

## 2024-08-26 PROCEDURE — 88112 CYTOPATH CELL ENHANCE TECH: CPT | Performed by: PATHOLOGY

## 2024-08-26 PROCEDURE — 99291 CRITICAL CARE FIRST HOUR: CPT | Performed by: STUDENT IN AN ORGANIZED HEALTH CARE EDUCATION/TRAINING PROGRAM

## 2024-08-26 PROCEDURE — 83615 LACTATE (LD) (LDH) ENZYME: CPT | Mod: PARLAB | Performed by: NURSE PRACTITIONER

## 2024-08-26 PROCEDURE — 71045 X-RAY EXAM CHEST 1 VIEW: CPT | Performed by: RADIOLOGY

## 2024-08-26 PROCEDURE — 88342 IMHCHEM/IMCYTCHM 1ST ANTB: CPT | Performed by: PATHOLOGY

## 2024-08-26 PROCEDURE — 36415 COLL VENOUS BLD VENIPUNCTURE: CPT

## 2024-08-26 PROCEDURE — 88305 TISSUE EXAM BY PATHOLOGIST: CPT | Performed by: PATHOLOGY

## 2024-08-26 PROCEDURE — 80069 RENAL FUNCTION PANEL: CPT

## 2024-08-26 PROCEDURE — 84157 ASSAY OF PROTEIN OTHER: CPT | Mod: PARLAB | Performed by: NURSE PRACTITIONER

## 2024-08-26 PROCEDURE — 87206 SMEAR FLUORESCENT/ACID STAI: CPT | Mod: PARLAB | Performed by: NURSE PRACTITIONER

## 2024-08-26 PROCEDURE — 89051 BODY FLUID CELL COUNT: CPT | Performed by: NURSE PRACTITIONER

## 2024-08-26 PROCEDURE — 93005 ELECTROCARDIOGRAM TRACING: CPT

## 2024-08-26 PROCEDURE — 86920 COMPATIBILITY TEST SPIN: CPT

## 2024-08-26 PROCEDURE — 2500000002 HC RX 250 W HCPCS SELF ADMINISTERED DRUGS (ALT 637 FOR MEDICARE OP, ALT 636 FOR OP/ED)

## 2024-08-26 PROCEDURE — 0W993ZZ DRAINAGE OF RIGHT PLEURAL CAVITY, PERCUTANEOUS APPROACH: ICD-10-PCS | Performed by: THORACIC SURGERY (CARDIOTHORACIC VASCULAR SURGERY)

## 2024-08-26 PROCEDURE — 87070 CULTURE OTHR SPECIMN AEROBIC: CPT | Mod: PARLAB | Performed by: NURSE PRACTITIONER

## 2024-08-26 RX ORDER — SODIUM CHLORIDE, SODIUM LACTATE, POTASSIUM CHLORIDE, CALCIUM CHLORIDE 600; 310; 30; 20 MG/100ML; MG/100ML; MG/100ML; MG/100ML
100 INJECTION, SOLUTION INTRAVENOUS CONTINUOUS
Status: DISCONTINUED | OUTPATIENT
Start: 2024-08-26 | End: 2024-08-27

## 2024-08-26 RX ADMIN — SODIUM CHLORIDE, POTASSIUM CHLORIDE, SODIUM LACTATE AND CALCIUM CHLORIDE 100 ML/HR: 600; 310; 30; 20 INJECTION, SOLUTION INTRAVENOUS at 20:32

## 2024-08-26 RX ADMIN — KETOROLAC TROMETHAMINE 15 MG: 30 INJECTION, SOLUTION INTRAMUSCULAR at 23:19

## 2024-08-26 RX ADMIN — SODIUM CHLORIDE, POTASSIUM CHLORIDE, SODIUM LACTATE AND CALCIUM CHLORIDE 500 ML: 600; 310; 30; 20 INJECTION, SOLUTION INTRAVENOUS at 16:21

## 2024-08-26 RX ADMIN — SODIUM CHLORIDE, POTASSIUM CHLORIDE, SODIUM LACTATE AND CALCIUM CHLORIDE 100 ML/HR: 600; 310; 30; 20 INJECTION, SOLUTION INTRAVENOUS at 16:21

## 2024-08-26 RX ADMIN — AZITHROMYCIN MONOHYDRATE 500 MG: 500 INJECTION, POWDER, LYOPHILIZED, FOR SOLUTION INTRAVENOUS at 12:54

## 2024-08-26 RX ADMIN — CEFTRIAXONE SODIUM 1 G: 1 INJECTION, SOLUTION INTRAVENOUS at 05:19

## 2024-08-26 RX ADMIN — KETOROLAC TROMETHAMINE 15 MG: 30 INJECTION, SOLUTION INTRAMUSCULAR at 16:16

## 2024-08-26 RX ADMIN — KETOROLAC TROMETHAMINE 15 MG: 30 INJECTION, SOLUTION INTRAMUSCULAR at 02:41

## 2024-08-26 RX ADMIN — KETOROLAC TROMETHAMINE 15 MG: 30 INJECTION, SOLUTION INTRAMUSCULAR at 08:34

## 2024-08-26 ASSESSMENT — PAIN SCALES - GENERAL
PAINLEVEL_OUTOF10: 0 - NO PAIN
PAINLEVEL_OUTOF10: 9
PAINLEVEL_OUTOF10: 10 - WORST POSSIBLE PAIN
PAINLEVEL_OUTOF10: 0 - NO PAIN
PAINLEVEL_OUTOF10: 7

## 2024-08-26 ASSESSMENT — ENCOUNTER SYMPTOMS
FREQUENCY: 0
NAIL CHANGES: 0
ORTHOPNEA: 0
POOR WOUND HEALING: 0
COUGH: 0
DIZZINESS: 0
MUSCLE CRAMPS: 0
BLOATING: 0
NAUSEA: 0
SHORTNESS OF BREATH: 0
STIFFNESS: 0
PALPITATIONS: 0
FOCAL WEAKNESS: 0
ALTERED MENTAL STATUS: 0
BLURRED VISION: 0
VOMITING: 0
WEAKNESS: 1
MYALGIAS: 0
IRREGULAR HEARTBEAT: 0
DECREASED APPETITE: 0
LIGHT-HEADEDNESS: 0
DOUBLE VISION: 0
DYSPNEA ON EXERTION: 0

## 2024-08-26 ASSESSMENT — PAIN DESCRIPTION - DESCRIPTORS: DESCRIPTORS: THROBBING

## 2024-08-26 ASSESSMENT — PAIN - FUNCTIONAL ASSESSMENT
PAIN_FUNCTIONAL_ASSESSMENT: 0-10

## 2024-08-26 ASSESSMENT — PAIN DESCRIPTION - LOCATION: LOCATION: CHEST

## 2024-08-26 NOTE — PROGRESS NOTES
Internal Medicine Progress Note         Ayesha Nova is a 54 y.o. female on day 1 of admission presenting with Chest pain, unspecified type.    SUBJECTIVE  Patient is uncomfortable at rest due to persistent chest pain and poor rest overnight. She  reports some pain relief from toradol but says it is not effective for long. She has a cough which is accompanied by pounding headache and exacerbation of chest pain.     OBJECTIVE    Vitals:    08/26/24 0521 08/26/24 0800 08/26/24 0905 08/26/24 0926   BP: 94/56  102/70    BP Location: Left arm      Patient Position: Sitting      Pulse: (!) 112 (!) 118 (!) 121    Resp: (!) 35 (!) 33 (!) 27    Temp: 36.5 °C (97.7 °F)      TempSrc: Temporal      SpO2: 95% 93% 93%    Weight:    60.6 kg (133 lb 9.6 oz)   Height:          Results from last 7 days   Lab Units 08/26/24  0516 08/25/24  0601 08/25/24  0058   WBC AUTO x10*3/uL 12.8*   < > 11.0   HEMOGLOBIN g/dL 11.5*   < > 12.0   HEMATOCRIT % 35.5*   < > 36.9   PLATELETS AUTO x10*3/uL 365   < > 397   NEUTROS PCT AUTO %  --   --  76.8   LYMPHS PCT AUTO %  --   --  15.1   MONOS PCT AUTO %  --   --  6.8   EOS PCT AUTO %  --   --  0.5    < > = values in this interval not displayed.     Results from last 7 days   Lab Units 08/26/24 0516 08/25/24  0601 08/25/24  0058   SODIUM mmol/L 135*   < > 134*   POTASSIUM mmol/L 5.1   < > 4.0   CHLORIDE mmol/L 103   < > 99   CO2 mmol/L 25   < > 23   BUN mg/dL 13   < > 15   CREATININE mg/dL 0.81   < > 0.66   CALCIUM mg/dL 9.3   < > 9.9   PROTEIN TOTAL g/dL  --   --  7.2   BILIRUBIN TOTAL mg/dL  --   --  0.8   ALK PHOS U/L  --   --  45   ALT U/L  --   --  6*   AST U/L  --   --  15   GLUCOSE mg/dL 96   < > 107*    < > = values in this interval not displayed.       Scheduled Medications  azithromycin, 500 mg, intravenous, q24h  cefTRIAXone, 1 g, intravenous, q24h  heparin (porcine), 5,000 Units, subcutaneous, q8h  perflutren lipid  microspheres, 0.5-10 mL of dilution, intravenous, Once in imaging  perflutren protein A microsphere, 0.5 mL, intravenous, Once in imaging  polyethylene glycol, 17 g, oral, Daily  sulfur hexafluoride microsphr, 2 mL, intravenous, Once in imaging           Physical Exam  General: alert and oriented. No acute distress.  HEENT: oral mucosa moist, EOMI. No JVD.   CHEST: clear breath sounds, no crackles, no wheeze, no tachypnea. Difficulty auscultating due to patient's inability to deeply inspire 2/2 chest pain.  CVS: regular rate and rhythm, no murmurs. Tachycardia.  ABD: Soft, Non Tender, BS present.  EXT: no edema.  SKIN: no rash.  NEURO: Nonfocal grossly.                 ASSESSMENT & PLAN  Ayesha Nova is a 54 y.o. female with a history of metastatic breast cancer, cardiac tamponade s/p pericardial window, and hypothyroidism who presented to the ED on 8/25 for chest pain that intensified with improvement from NSAIDs a few hours prior to her arrival.     #Chest pain  #Recurrent pericardial effusion likely 2/2 metastatic right breast cancer s/p lumpectomy in 2016  - Patient had pericardial window procedure 05/2024 after presenting for pericardial effusion likely 2/2 metastatic breast cancer.  PLAN:  - Cardiothoracic surgery and cardiology consults placed. Per cardiology the case was discussed with cardiothoracic surgery and they believe it is reasonable to proceed with another pericardial window. Due to the likely malignant origin of the effusion, pericardiocentesis would result in recurrent fluid accumulation.  - Patient is currently receiving 400mg ibuprofen q8h PRN and 15mg IV toradol q6h PRN for pain control. Continue to monitor kidney function.  - Patient remains NPO in anticipation of possible pericardial window procedure.   - Most recent BP is 102/70, increased from admission. Continue to monitor on telemetry.     #Right lung opacity, suspected pneumonia vs. metastatic lesion  #Right lobar pulmonary artery  encasement 2/2 R upper lobe and hilar mass  - Current CT imaging showed an opacity in the right lung apex suggestive of pneumonia or a new metastatic lesion.   PLAN:  - Patient is receiving rocephin 1g and azithromycin 500mg daily for possible pneumonia focus.  - Continue duonebs PRN, Tessalon and robitussin PRN.   - Follow up outpatient with heme/onc.    #Worsening loculated moderate right pleural effusion  - Was found to have large pleural effusion 2/2 metastatic cancer s/p pericardial window on 5/2.  Chest tube was placed and fluid removed.  Chest tube removed on 5/3   - Current CT imaging shows recurrent right pleural effusion that is worse compared to previous imaging.   - Thoracentesis performed today, 1L of sudha colored fluid extracted. Diet was advanced to adult cardiac.  PLAN:  - Pulmonology consult placed for pleural effusion, pending recommendations and tentative thoracentesis.   - Patient does not require oxygen at this time. Patient is breathing shallowly with increased respiratory rate due to worsening of chest pain with deep inspiration.     #Hypotension  PLAN:  - Patient arrived to ED hypotensive and tachycardic. 1.5 L of NS was administered, pressures remain soft but stable. Continue to monitor.    Chronic Conditions  #Hypothyroidism  PLAN:  - Patient has hx of hypothyroidism but denies taking any medications for it. TSH came back normal.    DVT ppx: Subcu heparin  Diet: Adult cardiac, sodium restricted.  Consults: Cardiothoracic surgery, cardiology, pulmonology  CODE STATUS: Full code    Mega Vergara MD   Please SecureChat for any further questions  This is a preliminary note, please await attending attestation for final A/P

## 2024-08-26 NOTE — PROCEDURES
Interventional Radiology Brief Postprocedure Note    Attending: Marisa Longo MD    Assistant:   Staff Role   Nova Cueto Radiology Technologist   Sadia Key, RN Radiology Nurse   Marisa Longo MD Radiologist       Diagnosis:   1. Chest pain, unspecified type        2. Pericardial effusion (HHS-HCC)        3. Pleural effusion        4. Pericardial effusion with cardiac tamponade (HHS-HCC)  Transthoracic Echo (TTE) Complete    Transthoracic Echo (TTE) Complete    CANCELED: Transthoracic Echo (TTE) Complete    CANCELED: Transthoracic Echo (TTE) Complete      5. Malignant pericardial effusion in diseases classified elsewhere (Multi)  Transthoracic Echo (TTE) Complete          Description of procedure: US thoracentesis    Timeout:  Yes    Procedure Area: Procedure Area     Anesthesia:   Local/Topical    Complications: None    Estimated Blood Loss: minimal    Medications (Filter: Administrations occurring from 1234 to 1234 on 08/26/24) As of 08/26/24 1234      None          No specimens collected      See detailed result report with images in PACS.    The patient tolerated the procedure well without incident or complication and is in stable condition.

## 2024-08-26 NOTE — CONSULTS
Inpatient consult to Cardiology  Consult performed by: Sin Morillo MD PhD  Consult ordered by: Bbo Quiñonez MD  Reason for consult: Pericardial effusion        History Of Present Illness:    Ayesha Nova is a 54 y.o. female with a PMH significant for longstanding recurrent metastatic breast cancer (initial dx 2016 - ductal carcinoma [G3, ER +, HER2/della amplified] with metastatic lesions to bilateral lungs - has declined treatment), LLL Lung Nodule (S/p surgical resection in 2018), Malignant Pericardial Effusion (S/p pericardial window 5/2/24) who [facility] to [facility] with complaints of chest discomfort and tachycardia.     ED Course: Patient vitals were notable for hypotension in 85-95 systolic range and tachycardia. Labs were unremarkable and troponin was flat. EKG was notable for sinus tachycardia with low voltage. CT angio of the chest showed an increase in pericardial effusion. Loculated right pleural effusion was also present, as well as central cavitations in the right lung apex that were suggestive of either pneumonia or a metastatic lesion. No pulmonary emboli were noted. The patient was given a 500mL NS bolus, followed by another 1L bolus. She was also given 0.2mg dilaudid and Zofran for pain management and nausea.      Given the patients CT imaging revealing a Right pleural effusion and a pericardial effusion, cardiac surgery was consulted for evaluation & management.     12 point review of systems including (Constitutional, Eyes, ENMT, Respiratory, Cardiac, Gastrointestinal, Neurological, Psychiatric, and Hematologic) was performed and is otherwise negative.    Past medical history:  As above.    Medications were reviewed.    Allergies were reviewed.    Social history:  Patient denies smoking, alcohol abuse, or illicit drug use.    Family history: Was reviewed and not contributory to the current presentation of the patient.          Last Recorded  Vitals:  Vitals:    08/25/24 1600 08/25/24 1938 08/26/24 0000 08/26/24 0521   BP:  108/67  94/56   BP Location:  Left arm  Left arm   Patient Position:  Sitting  Sitting   Pulse:  (!) 116  (!) 112   Resp:  18  (!) 35   Temp: 37 °C (98.6 °F) 37.3 °C (99.1 °F) 37.7 °C (99.9 °F) 36.5 °C (97.7 °F)   TempSrc: Temporal Temporal Temporal Temporal   SpO2:  99%  95%   Weight:       Height:           Last Labs:  CBC - 8/26/2024:  5:16 AM  12.8 11.5 365    35.5      CMP - 8/26/2024:  5:16 AM  9.3 7.2 15 --- 0.8   2.9 3.3 6 45      PTT - 5/2/2024: 11:26 AM  1.3   14.4 29     Troponin I, High Sensitivity   Date/Time Value Ref Range Status   08/25/2024 01:58 AM 4 0 - 13 ng/L Final   08/25/2024 12:58 AM 4 0 - 13 ng/L Final   06/03/2024 03:44 PM <3 0 - 13 ng/L Final     BNP   Date/Time Value Ref Range Status   05/01/2024 07:24 PM 21 0 - 99 pg/mL Final   03/07/2024 01:03 PM 35 0 - 99 pg/mL Final     VLDL   Date/Time Value Ref Range Status   08/23/2019 08:50 AM 13 0 - 40 mg/dL Final      Last I/O:  I/O last 3 completed shifts:  In: 1050 (17.2 mL/kg) [IV Piggyback:1050]  Out: - (0 mL/kg)   Weight: 61.1 kg     Past Cardiology Tests (Last 3 Years):  EKG:  ECG 12 lead 06/03/2024      Electrocardiogram, 12-lead PRN ACS symptoms 05/03/2024      ECG 12 Lead 05/02/2024      Electrocardiogram, 12-lead PRN ACS symptoms 05/01/2024      ECG 12 lead 05/01/2024      ECG 12 lead 03/07/2024    Echo:  Transthoracic echo (TTE) limited 05/06/2024      Transesophageal Echo (DIAN)       Transthoracic Echo (TTE) Complete 05/02/2024      Transthoracic echo (TTE) limited 04/15/2024      Onco-Echo Complete (Strain And 3D) 03/15/2024    Ejection Fractions:  EF   Date/Time Value Ref Range Status   03/15/2024 12:43 PM 50 %      Cath:  No results found for this or any previous visit from the past 1095 days.    Stress Test:  No results found for this or any previous visit from the past 1095 days.    Cardiac Imaging:  No results found for this or any previous visit  from the past 1095 days.      Past Medical History:  She has a past medical history of Personal history of malignant neoplasm of breast.    Past Surgical History:  She has a past surgical history that includes Hysterectomy (02/29/2016); Other surgical history (04/20/2022); Other surgical history (04/20/2022); and Other surgical history (04/20/2022).      Social History:  She reports that she quit smoking about 4 years ago. Her smoking use included cigarettes. She has never used smokeless tobacco. She reports that she does not drink alcohol and does not use drugs.    Family History:  Family History   Problem Relation Name Age of Onset    Dementia Mother      Heart attack Father      Other (cabg) Father          Allergies:  Vancomycin, Morphine, and Sulfa (sulfonamide antibiotics)    Inpatient Medications:  Scheduled medications   Medication Dose Route Frequency    azithromycin  500 mg intravenous q24h    cefTRIAXone  1 g intravenous q24h    heparin (porcine)  5,000 Units subcutaneous q8h    perflutren lipid microspheres  0.5-10 mL of dilution intravenous Once in imaging    perflutren protein A microsphere  0.5 mL intravenous Once in imaging    polyethylene glycol  17 g oral Daily    sulfur hexafluoride microsphr  2 mL intravenous Once in imaging     PRN medications   Medication    acetaminophen    Or    acetaminophen    Or    acetaminophen    benzonatate    guaiFENesin    HYDROmorphone    ibuprofen    ipratropium-albuteroL    ketorolac    melatonin    ondansetron     Continuous Medications   Medication Dose Last Rate     Outpatient Medications:  Current Outpatient Medications   Medication Instructions    clindamycin (Cleocin) 150 mg capsule oral, 4 times daily, Not sure of clindamycin dose, hx of z-kota and omnicef  i.e. CXR's    hydrocortisone-pramoxine (Analpram-HC) 1-1 % rectal cream 1 Application, rectal, 2 times daily, to affected area       Physical Exam:  Constitutional:       General: Mild respiratory  distress.  Sitting up    HENT:      Head: Normocephalic and atraumatic.      Nose: Nose normal.      Mouth/Throat:      Mouth: Mucous membranes are moist.      Pharynx: Oropharynx is clear.   Eyes:      Extraocular Movements: Extraocular movements intact.      Conjunctiva/sclera: Conjunctivae normal.      Pupils: Pupils are equal, round, and reactive to light.   Cardiovascular:   Tachycardic, regular  Slightly muffled heart sounds    Pulmonary:   Tachypneic,  Significant decrease in breath sounds in right lung    Abdominal:      General: Abdomen is flat. Bowel sounds are normal. There is no distension.      Palpations: Abdomen is soft.   Musculoskeletal:         General: Normal range of motion.      Cervical back: Normal range of motion and neck supple.      Right lower leg: No edema.      Left lower leg: No edema.   Skin:     General: Skin is warm and dry.      Capillary Refill: Capillary refill takes less than 2 seconds.      Findings: No lesion.      Nails: There is no clubbing.   Neurological:      General: No focal deficit present.      Mental Status: She is alert and oriented to person, place, and time. Mental status is at baseline.      Cranial Nerves: Cranial nerves 2-12 are intact.      Sensory: Sensation is intact.      Motor: Motor function is intact.   Psychiatric:         Attention and Perception: Attention and perception normal.         Mood and Affect: Mood normal.         Speech: Speech normal.         Behavior: Behavior normal.         Thought Content: Thought content normal.         Cognition and Memory: Cognition and memory normal.         Judgment: Judgment normal.         Assessment/Plan      Malignant Pericardial Effusion   - Secondary to underlying metastatic breast cancer   - S/p Urgent Pericardial Window on 5/2/24 with Cardiac Surgeon, Dr. Saad Giles   - Patient presented to the ED with tachycardia and dyspnea on 8/25  --> CT chest showing fluid around the pericardium   - Patient is with  tachycardia (HR 100s) however no hypotension         Malignant Pleural Effusion   - Right loculated effusion appreciated on 8/25 CT chest  - Current O2 Requirements: RA  - Plan for Inpatient thoracentesis         Patient has recurrent pericardial effusion which appears to be large.  She also has significant right-sided pleural effusion and has significant shortness of breath now causing her to sit up.  She is planned to get thoracentesis later this morning.  I discussed the case with CT surgery and I believe it would be reasonable to proceed with redo pericardial window as without other functioning window, despite pericardiocentesis the fluid reaccumulation and pericardial will be back very likely as it appears to have malignant origin.  Also she may need evaluation by thoracic surgery team for consideration of Pleurx catheter     will continue to monitor her closely in [facility] for time being.          ICU Attending Note    This critically ill patient continues to be at-risk for clinically significant deterioration / failure due to the above mentioned dysfunctional, unstable organ systems.  I have personally identified and managed all complex critical care issues to prevent aforementioned clinical deterioration.  Critical care time is spent at bedside and/or the immediate area and has included, but is not limited to, the review of diagnostic tests, labs, radiographs, serial assessments of hemodynamics, respiratory status, ventilatory management, and family updates. More than 50% of the time was spent for coordination of care/family education.    Critical care time: 45 minutes        Sin Morillo MD, PhD, [facility], [facility]  Interventional Cardiology, [facility]  Associate Professor of Medicine, [facility]  Office: [phone]

## 2024-08-26 NOTE — CONSULTS
PULMONOLOGY CONSULT NOTE       PATIENT NAME: Ayesha Nova  MRN: [id number]    SERVICE DATE:  8/26/2024  SERVICE TIME:  4:13 PM    REASON FOR CONSULT: Loculated Pleural Effusion    Ayesha Nova is a 54 y.o. female on day 1 of admission presenting with Chest pain, unspecified type.  ASSESSMENT & PLAN    #Recurrent pericardial effusion likely 2/2 metastatic breast cancer   #Right lung opacity, suspected pneumonia vs. metastatic lesion  #Right lobar pulmonary artery encasement 2/2 R upper lobe and hilar mass  #Worsening loculated moderate right pleural effusion   #Hypotension   #Tachycardia    -Patient's malignant effusion secondary to metastatic breast cancer that she was diagnosed 8 years ago.  Patient had denied pursuing any medical or surgical interventions for her cancer.  -Patient had a pericardial window in May 2024  -Imaging shows recurrence of the pericardial effusion along with what appears to be a loculated versus malignant pleural effusion    PLAN:  -Patient underwent thoracentesis.  Studies pending.  -Plan for a xiphoid pericardial window with CT surgery tomorrow.  -I suspect that due to patient's advancement of her cancer, she is can have recurrence of the pericardial effusion as well as the pleural effusion.  Patient may need a Pleurx catheter down the road.  -Continue pain management with Toradol, Dilaudid  -Symptomatic control for cough with Tessalon Perles  -Patient's overall prognosis is very guarded.         HPI    Ayesha Nova is a 54 y.o. female with medical history significant for metastatic breast cancer, cardiac tamponade status post pericardial window, hypothyroidism presenting to [facility] on 8/25 for chest pain that was started few hours prior to arrival.  During admission patient was found to have a large pericardial effusion as well as a loculated pleural effusion and was admitted for further evaluation.  Of note, patient was admitted back in May 2024 for similar episodes and at that time she  underwent a pericardial window procedure.  Patient noted that since then she was having worsening chest pain and would usually take ibuprofen to relieve the pain however this time around she was unable to show symptoms which prompted her to come to the emergency department for further evaluation.  Patient did recently also finish a 14-day course of doxycycline for a suspected pneumonia.  Pulmonology team was consulted for recurrent right pleural effusion.    OBJECTIVE    Vitals:    08/26/24 1500 08/26/24 1515 08/26/24 1530 08/26/24 1545   BP:  94/59 97/58 99/62   BP Location:       Patient Position:       Pulse: (!) 120 (!) 120 (!) 118 (!) 118   Resp: 24 23 15 13   Temp:       TempSrc:       SpO2: 95% 95% 96% 96%   Weight:       Height:          Results from last 7 days   Lab Units 08/26/24  0516 08/25/24  0601 08/25/24  0058   WBC AUTO x10*3/uL 12.8*   < > 11.0   HEMOGLOBIN g/dL 11.5*   < > 12.0   HEMATOCRIT % 35.5*   < > 36.9   PLATELETS AUTO x10*3/uL 365   < > 397   NEUTROS PCT AUTO %  --   --  76.8   LYMPHS PCT AUTO %  --   --  15.1   MONOS PCT AUTO %  --   --  6.8   EOS PCT AUTO %  --   --  0.5    < > = values in this interval not displayed.     Results from last 7 days   Lab Units 08/26/24  0516 08/25/24  0601 08/25/24  0058   SODIUM mmol/L 135*   < > 134*   POTASSIUM mmol/L 5.1   < > 4.0   CHLORIDE mmol/L 103   < > 99   CO2 mmol/L 25   < > 23   BUN mg/dL 13   < > 15   CREATININE mg/dL 0.81   < > 0.66   CALCIUM mg/dL 9.3   < > 9.9   PROTEIN TOTAL g/dL  --   --  7.2   BILIRUBIN TOTAL mg/dL  --   --  0.8   ALK PHOS U/L  --   --  45   ALT U/L  --   --  6*   AST U/L  --   --  15   GLUCOSE mg/dL 96   < > 107*    < > = values in this interval not displayed.       Scheduled Medications  azithromycin, 500 mg, intravenous, q24h  cefTRIAXone, 1 g, intravenous, q24h  heparin (porcine), 5,000 Units, subcutaneous, q8h  lactated Ringer's, 500 mL, intravenous, Once  perflutren lipid microspheres, 0.5-10 mL of dilution,  intravenous, Once in imaging  perflutren protein A microsphere, 0.5 mL, intravenous, Once in imaging  polyethylene glycol, 17 g, oral, Daily  sulfur hexafluoride microsphr, 2 mL, intravenous, Once in imaging           Physical Exam   Physical Exam:  General:  Pleasant and cooperative. No apparent distress.  HEENT:  Normocephalic, atraumatic, mucus membranes moist.   Neck:  Trachea midline.  No JVD.    Chest: Tachycardic  Abdomen: Bowel sounds present in all four quadrants, abdomen is soft, non-tender, non-distended.  Extremities:  No lower extremity edema or cyanosis.   Neurological:  AAOx3. No focal deficits.  Skin:  Warm and dry.                 Dr. Christos Palafox  Internal Medicine PGY-3  August 26, 2024 4:13 PM

## 2024-08-26 NOTE — PROGRESS NOTES
Cardiac Surgery  Progress Note     Ayesha Nova is a 54 y.o. female on day 1 of admission presenting with Chest pain, unspecified type.    Subjective     Interval HPI: Seen and assessed patient this AM while they were laying in bed; plan for IR thoracentesis today. Reports exertional dyspnea, however is asymptomatic at rest.     Review of Systems   Constitutional: Negative for decreased appetite and malaise/fatigue.   HENT:  Negative for congestion.    Eyes:  Negative for blurred vision and double vision.   Cardiovascular:  Negative for chest pain, dyspnea on exertion, irregular heartbeat, leg swelling, orthopnea and palpitations.   Respiratory:  Negative for cough and shortness of breath.    Endocrine: Negative for cold intolerance and heat intolerance.   Skin:  Negative for nail changes, poor wound healing and rash.   Musculoskeletal:  Negative for arthritis, muscle cramps, muscle weakness, myalgias and stiffness.   Gastrointestinal:  Negative for bloating, nausea and vomiting.   Genitourinary:  Negative for frequency, hesitancy and urgency.   Neurological:  Positive for weakness. Negative for dizziness, focal weakness and light-headedness.   Psychiatric/Behavioral:  Negative for altered mental status.    Allergic/Immunologic: Negative for environmental allergies.         Objective   Physical Exam  Physical Exam  Vitals and nursing note reviewed.   Constitutional:       General: She is not in acute distress.     Appearance: Normal appearance. She is not ill-appearing.   HENT:      Head: Normocephalic and atraumatic.      Nose: Nose normal.      Mouth/Throat:      Mouth: Mucous membranes are moist.      Pharynx: Oropharynx is clear.   Eyes:      Extraocular Movements: Extraocular movements intact.      Conjunctiva/sclera: Conjunctivae normal.      Pupils: Pupils are equal, round, and reactive to light.   Cardiovascular:      Rate and Rhythm: Normal rate and regular rhythm.      Pulses: Normal pulses.      Heart  sounds: Normal heart sounds, S1 normal and S2 normal. No murmur heard.     No systolic murmur is present.      No friction rub. No gallop.   Pulmonary:      Effort: Pulmonary effort is normal.      Breath sounds: Normal breath sounds.   Abdominal:      General: Abdomen is flat. Bowel sounds are normal. There is no distension.      Palpations: Abdomen is soft.   Musculoskeletal:         General: Normal range of motion.      Cervical back: Normal range of motion and neck supple.      Right lower leg: No edema.      Left lower leg: No edema.   Skin:     General: Skin is warm and dry.      Capillary Refill: Capillary refill takes less than 2 seconds.      Findings: No lesion.      Nails: There is no clubbing.   Neurological:      General: No focal deficit present.      Mental Status: She is alert and oriented to person, place, and time. Mental status is at baseline.      Cranial Nerves: Cranial nerves 2-12 are intact.      Sensory: Sensation is intact.      Motor: Motor function is intact.   Psychiatric:         Attention and Perception: Attention and perception normal.         Mood and Affect: Mood normal.         Speech: Speech normal.         Behavior: Behavior normal.         Thought Content: Thought content normal.         Cognition and Memory: Cognition and memory normal.         Judgment: Judgment normal.         Last Recorded Vitals  Vitals:    08/26/24 0521 08/26/24 0800 08/26/24 0905 08/26/24 0926   BP: 94/56  102/70    BP Location: Left arm      Patient Position: Sitting      Pulse: (!) 112 (!) 118 (!) 121    Resp: (!) 35 (!) 33 (!) 27    Temp: 36.5 °C (97.7 °F)      TempSrc: Temporal      SpO2: 95% 93% 93%    Weight:    60.6 kg (133 lb 9.6 oz)   Height:            Intake/Output last 3 Shifts:  I/O last 3 completed shifts:  In: 1050 (17.2 mL/kg) [IV Piggyback:1050]  Out: - (0 mL/kg)   Weight: 61.1 kg     Inpatient Medications  azithromycin, 500 mg, intravenous, q24h  cefTRIAXone, 1 g, intravenous,  q24h  heparin (porcine), 5,000 Units, subcutaneous, q8h  perflutren lipid microspheres, 0.5-10 mL of dilution, intravenous, Once in imaging  perflutren protein A microsphere, 0.5 mL, intravenous, Once in imaging  polyethylene glycol, 17 g, oral, Daily  sulfur hexafluoride microsphr, 2 mL, intravenous, Once in imaging      Continuous medications     PRN medications  PRN medications: acetaminophen **OR** acetaminophen **OR** acetaminophen, benzonatate, guaiFENesin, HYDROmorphone, ibuprofen, ipratropium-albuteroL, ketorolac, melatonin, ondansetron     LDA:       Relevant Results  Lab Review  Results from last 7 days   Lab Units 08/26/24  0516   WBC AUTO x10*3/uL 12.8*   HEMOGLOBIN g/dL 11.5*   HEMATOCRIT % 35.5*   PLATELETS AUTO x10*3/uL 365     Results from last 7 days   Lab Units 08/26/24  0516 08/25/24  0601 08/25/24  0058   SODIUM mmol/L 135*   < > 134*   POTASSIUM mmol/L 5.1   < > 4.0   CHLORIDE mmol/L 103   < > 99   CO2 mmol/L 25   < > 23   BUN mg/dL 13   < > 15   CREATININE mg/dL 0.81   < > 0.66   CALCIUM mg/dL 9.3   < > 9.9   PROTEIN TOTAL g/dL  --   --  7.2   BILIRUBIN TOTAL mg/dL  --   --  0.8   ALK PHOS U/L  --   --  45   ALT U/L  --   --  6*   AST U/L  --   --  15   GLUCOSE mg/dL 96   < > 107*    < > = values in this interval not displayed.     Results from last 7 days   Lab Units 08/26/24  0516   MAGNESIUM mg/dL 1.92             Cardiology Tests     Echo:  Transthoracic Echo (TTE) Complete 08/26/2024  PHYSICIAN INTERPRETATION:  Left Ventricle: Left ventricular ejection fraction is normal, by visual estimate at 60-65%. There are no regional left ventricular wall motion abnormalities. The left ventricular cavity size is normal. Spectral Doppler shows a normal pattern of left ventricular diastolic filling.  Left Atrium: The left atrium is normal in size.  Right Ventricle: The right ventricle is small in size. There is normal right ventricular global systolic function.  Right Atrium: The right atrium is small  in size.  Aortic Valve: The aortic valve was not well visualized. There is no evidence of aortic valve regurgitation.  Mitral Valve: The mitral valve is normal in structure. There is trace mitral valve regurgitation.  Tricuspid Valve: The tricuspid valve is structurally normal. No evidence of tricuspid regurgitation.  Pulmonic Valve: The pulmonic valve is not well visualized. There is no indication of pulmonic valve regurgitation.  Pericardium: There is a large pericardial effusion.  Aorta: The aortic root is normal.        CONCLUSIONS:   1. Left ventricular ejection fraction is normal, by visual estimate at 60-65%.   2. There is normal right ventricular global systolic function.   3. There is a large pericardial effusion.   4. Echocardiographic findings are consistent with cardiac tamponade.    Transthoracic echo (TTE) limited 05/06/2024  PHYSICIAN INTERPRETATION:  Left Ventricle: The left ventricular systolic function is normal, with an estimated ejection fraction of 60%. There are no regional wall motion abnormalities. The left ventricular cavity size is normal. Spectral Doppler shows a normal pattern of left ventricular diastolic filling.  Left Atrium: The left atrium is normal in size.  Right Ventricle: The right ventricle is normal in size. There is normal right ventricular global systolic function.  Right Atrium: The right atrium is normal in size.  Aortic Valve: The aortic valve appears structurally normal. There is no evidence of aortic valve regurgitation. The peak instantaneous gradient of the aortic valve is 6.3 mmHg. The mean gradient of the aortic valve is 3.6 mmHg.  Mitral Valve: The mitral valve is normal in structure. There is no evidence of mitral valve regurgitation.  Tricuspid Valve: The tricuspid valve is structurally normal. No evidence of tricuspid regurgitation.  Pulmonic Valve: The pulmonic valve is structurally normal. There is no indication of pulmonic valve regurgitation.  Pericardium:  There is a small pericardial effusion anterior to the right ventricle.  Aorta: The aortic root is normal.        CONCLUSIONS:   1. Left ventricular systolic function is normal with a 60% estimated ejection fraction.   2. Small pericardial effusion located anterior to the right ventricle.     Transthoracic Echo (TTE) Complete 05/02/2024  PHYSICIAN INTERPRETATION:  Left Ventricle: The left ventricular systolic function is normal, with an estimated ejection fraction of 55-60%. There are no regional wall motion abnormalities. The left ventricular cavity size is normal. Left ventricular diastolic filling was not assessed.  Left Atrium: The left atrium was not assessed.  Right Ventricle: The right ventricle was not assessed. Right ventricular systolic function not assessed.  Right Atrium: The right atrium was not assessed.  Aortic Valve: The aortic valve was not assessed. Aortic valve regurgitation was not assessed.  Mitral Valve: The mitral valve was not assessed. Mitral valve regurgitation was not assessed.  Tricuspid Valve: The tricuspid valve was not assessed. Tricuspid regurgitation was not assessed.  Pulmonic Valve: The pulmonic valve was not assessed. Pulmonic valve regurgitation was not assessed.  Pericardium: There is a large pericardial effusion. There is right ventricular diastolic collapse, is brief right atrial diastolic collapse and is inferior vena caval plethora. These findings are consistent with cardiac tamponade.  Aorta: The aortic root was not assessed.  Systemic Veins: The inferior vena cava appears mildly dilated.        CONCLUSIONS:   1. Left ventricular systolic function is normal with a 55-60% estimated ejection fraction.   2. Echocardiographic findings are consistent with cardiac tamponade.   3. There is a large pericardial effusion.     Transthoracic echo (TTE) limited 04/15/2024  PHYSICIAN INTERPRETATION:  Left Ventricle: The left ventricular systolic function is normal, with an estimated  ejection fraction of 65%. There are no regional wall motion abnormalities. The left ventricular cavity size is normal. Left ventricular diastolic filling was not assessed.  Left Atrium: The left atrium is normal in size.  Right Ventricle: The right ventricle is normal in size. There is normal right ventricular global systolic function.  Right Atrium: The right atrium is normal in size. Mild right atrial collapse.  Aortic Valve: The aortic valve appears structurally normal. There is no evidence of aortic valve regurgitation.  Mitral Valve: The mitral valve is normal in structure. There is no evidence of mitral valve regurgitation.  Tricuspid Valve: The tricuspid valve is structurally normal. There is trace tricuspid regurgitation. The Doppler estimated RVSP is within normal limits at 16.1 mmHg.  Pulmonic Valve: The pulmonic valve is not well visualized. The pulmonic valve regurgitation was not well visualized.  Pericardium: There is a large pericardial effusion.  Aorta: The aortic root is normal.        CONCLUSIONS:   1. Left ventricular systolic function is normal with a 65% estimated ejection fraction.   2. Mild right atrial collapse.   3. There is a large pericardial effusion.   4. RVSP within normal limits.   5. In comparisn to the previous study of 3/15/24, there has been an increase in size of the pericardial effusion.     Onco-Echo Complete (Strain And 3D) 03/15/2024  PHYSICIAN INTERPRETATION:  Left Ventricle: The left ventricular systolic function is hyperdynamic, with an estimated ejection fraction of 65-70%. There are no regional wall motion abnormalities. The left ventricular cavity size is normal. Spectral Doppler shows a normal pattern of left ventricular diastolic filling.  Left Atrium: The left atrium is normal in size.  Right Ventricle: The right ventricle is normal in size. There is normal right ventricular global systolic function.  Right Atrium: The right atrium is normal in size.  Aortic Valve:  The aortic valve appears structurally normal. There is no evidence of aortic valve regurgitation. The peak instantaneous gradient of the aortic valve is 3.8 mmHg. The mean gradient of the aortic valve is 2.2 mmHg.  Mitral Valve: The mitral valve is normal in structure. There is no evidence of mitral valve regurgitation.  Tricuspid Valve: The tricuspid valve is structurally normal. No evidence of tricuspid regurgitation.  Pulmonic Valve: The pulmonic valve is structurally normal. There is no indication of pulmonic valve regurgitation.  Pericardium: There is a moderately sized pericardial effusion. The effusion is circumferential.  Aorta: The aortic root is normal.  Systemic Veins: The inferior vena cava was not well visualized.     ONCO-CARDIOLOGY:  Vital Signs: The patients heart rate during the study was 75 beats per minute.  The patients blood pressure was 102/56 during the study.  Machine: This study was performed on the "Mobile Location, IP"-9.  Previous Study: The patient had a previous Onco-Cardiology echocardiogram dated  5/31/2023. The patient's previous study was performed on the "Mobile Location, IP"-9.        Onco-Cardiology Measurements:  Historical Measurements from Previous Study  2D EF (Biplane)                     63%  Global Longitudinal Strain (GLS) -22.3%     Current Measurements  2D EF (Biplane)                     64%  Global Longitudinal Strain (GLS) -20.6%     GLS Tracking Quality: Fair        CONCLUSIONS:   1. Left ventricular systolic function is hyperdynamic with a 65-70% estimated ejection fraction.   2. There is a moderate pericardial effusion.   3. The pericardial effusion is new in comparison to the study of 5/31/23.   4. Global longitudinal strain is normal at -20%.   5. No hemodynamic evidence of pericardial tamponade.         History of Present Illness: Ayesha Nova is a 54 y.o. female with a PMH significant for longstanding recurrent metastatic breast cancer (initial dx 2016 - ductal carcinoma [G3, ER +, HER2/della  amplified] with metastatic lesions to bilateral lungs - has declined treatment), LLL Lung Nodule (S/p surgical resection in 2018), Malignant Pericardial Effusion (S/p pericardial window 5/2/24) who presents to [facility] on 8/25/2024 with complaints of chest discomfort and tachycardia.     ED Course: Patient vitals were notable for hypotension in 85-95 systolic range and tachycardia. Labs were unremarkable and troponin was flat. EKG was notable for sinus tachycardia with low voltage. CT angio of the chest showed an increase in pericardial effusion. Loculated right pleural effusion was also present, as well as central cavitations in the right lung apex that were suggestive of either pneumonia or a metastatic lesion. No pulmonary emboli were noted. The patient was given a 500mL NS bolus, followed by another 1L bolus. She was also given 0.2mg dilaudid and Zofran for pain management and nausea.      Given the patients CT imaging revealing a Right pleural effusion and a pericardial effusion, cardiac surgery was consulted for evaluation & management.      Daily Events    8/26/24: No acute events ovenright; patient asymptomatic at rest. Plan for R thoracentesis today. TTE observed this AM showing a moderately large anterior pericardial effusion. Plan for OR tomorrow afternoon for a pericardial window creation.      Assessment & Plan      Malignant Pericardial Effusion   - Secondary to underlying metastatic breast cancer   - S/p Urgent Pericardial Window on 5/2/24 with Cardiac Surgeon, Dr. Saad Giles   - Patient presented to the ED with tachycardia and dyspnea on 8/25  --> CT chest showing fluid around the pericardium   - TTE on 8/26 showing recurrent of pericardial fluid with a moderate sized effusion   - Plan for redo pericardial window creation through a subxiphoid approach  - OR Date: 8/27/24        Malignant Pleural Effusion   - Right loculated effusion appreciated on 8/25 CT chest  -  Current O2 Requirements: RA  - S/p IR guided thoracentesis today on 8/26/24  --> Cytology pending  - Continue to follow CXrs     Above patient and plan discussed with cardiac surgeon, Dr. Sami Palacios. Plan as above. Will continue to follow along.     Jason Ellison, APRN-CNP

## 2024-08-26 NOTE — PROGRESS NOTES
08/26/24 0855   Discharge Planning   Living Arrangements Spouse/significant other   Support Systems Spouse/significant other;Children;Family members;Friends/neighbors   Assistance Needed Independent and driving PTA   Type of Residence Private residence   Number of Stairs to Enter Residence 1   Number of Stairs Within Residence 10   Do you have animals or pets at home? Yes   Type of Animals or Pets 2 dogs   Home or Post Acute Services None   Expected Discharge Disposition Home   Does the patient need discharge transport arranged? No     This TCC met with patient at bedside, introduced self and explained role.  Demographic information and insurance verified.  Patient is from home with spouse.  Denies SW needs at this time.  Patient anticipates returning home at discharge, no skilled needs.  Patient's family is able to transport home at discharge.  CT Surgery on consult.  Plan for inpatient thoracentesis.  Care Transitions will continue to follow.

## 2024-08-27 ENCOUNTER — APPOINTMENT (OUTPATIENT)
Dept: RADIOLOGY | Facility: HOSPITAL | Age: 54
DRG: 163 | End: 2024-08-27
Payer: COMMERCIAL

## 2024-08-27 ENCOUNTER — HOSPITAL ENCOUNTER (OUTPATIENT)
Dept: OPERATING ROOM | Facility: HOSPITAL | Age: 54
Discharge: HOME | End: 2024-08-27
Payer: COMMERCIAL

## 2024-08-27 ENCOUNTER — ANESTHESIA (OUTPATIENT)
Dept: OPERATING ROOM | Facility: HOSPITAL | Age: 54
End: 2024-08-27
Payer: COMMERCIAL

## 2024-08-27 ENCOUNTER — ANESTHESIA EVENT (OUTPATIENT)
Dept: OPERATING ROOM | Facility: HOSPITAL | Age: 54
End: 2024-08-27
Payer: COMMERCIAL

## 2024-08-27 LAB
ANION GAP SERPL CALC-SCNC: 11 MMOL/L (ref 10–20)
APTT PPP: 31 SECONDS (ref 27–38)
APTT PPP: 32 SECONDS (ref 27–38)
BUN SERPL-MCNC: 13 MG/DL (ref 6–23)
CALCIUM SERPL-MCNC: 9 MG/DL (ref 8.6–10.3)
CHLORIDE SERPL-SCNC: 102 MMOL/L (ref 98–107)
CO2 SERPL-SCNC: 27 MMOL/L (ref 21–32)
CREAT SERPL-MCNC: 0.7 MG/DL (ref 0.5–1.05)
EGFRCR SERPLBLD CKD-EPI 2021: >90 ML/MIN/1.73M*2
ERYTHROCYTE [DISTWIDTH] IN BLOOD BY AUTOMATED COUNT: 14.3 % (ref 11.5–14.5)
FIBRINOGEN PPP-MCNC: 643 MG/DL (ref 200–400)
GLUCOSE SERPL-MCNC: 93 MG/DL (ref 74–99)
HCT VFR BLD AUTO: 33.5 % (ref 36–46)
HGB BLD-MCNC: 10.6 G/DL (ref 12–16)
INR PPP: 1.2 (ref 0.9–1.1)
INR PPP: 1.3 (ref 0.9–1.1)
MAGNESIUM SERPL-MCNC: 1.7 MG/DL (ref 1.6–2.4)
MAGNESIUM SERPL-MCNC: 1.89 MG/DL (ref 1.6–2.4)
MCH RBC QN AUTO: 27.3 PG (ref 26–34)
MCHC RBC AUTO-ENTMCNC: 31.6 G/DL (ref 32–36)
MCV RBC AUTO: 86 FL (ref 80–100)
NRBC BLD-RTO: 0 /100 WBCS (ref 0–0)
PLATELET # BLD AUTO: 307 X10*3/UL (ref 150–450)
PLATELET # BLD AUTO: 362 X10*3/UL (ref 150–450)
POTASSIUM SERPL-SCNC: 4.9 MMOL/L (ref 3.5–5.3)
PROTHROMBIN TIME: 14 SECONDS (ref 9.8–12.8)
PROTHROMBIN TIME: 14.1 SECONDS (ref 9.8–12.8)
RBC # BLD AUTO: 3.88 X10*6/UL (ref 4–5.2)
SODIUM SERPL-SCNC: 135 MMOL/L (ref 136–145)
WBC # BLD AUTO: 8 X10*3/UL (ref 4.4–11.3)

## 2024-08-27 PROCEDURE — C1751 CATH, INF, PER/CENT/MIDLINE: HCPCS | Performed by: THORACIC SURGERY (CARDIOTHORACIC VASCULAR SURGERY)

## 2024-08-27 PROCEDURE — 2720000007 HC OR 272 NO HCPCS: Performed by: THORACIC SURGERY (CARDIOTHORACIC VASCULAR SURGERY)

## 2024-08-27 PROCEDURE — 88341 IMHCHEM/IMCYTCHM EA ADD ANTB: CPT | Mod: TC,PARLAB,WESLAB | Performed by: THORACIC SURGERY (CARDIOTHORACIC VASCULAR SURGERY)

## 2024-08-27 PROCEDURE — 85384 FIBRINOGEN ACTIVITY: CPT | Performed by: THORACIC SURGERY (CARDIOTHORACIC VASCULAR SURGERY)

## 2024-08-27 PROCEDURE — 71045 X-RAY EXAM CHEST 1 VIEW: CPT | Performed by: RADIOLOGY

## 2024-08-27 PROCEDURE — 3600000004 HC OR TIME - INITIAL BASE CHARGE - PROCEDURE LEVEL FOUR: Performed by: THORACIC SURGERY (CARDIOTHORACIC VASCULAR SURGERY)

## 2024-08-27 PROCEDURE — 85027 COMPLETE CBC AUTOMATED: CPT

## 2024-08-27 PROCEDURE — 3600000011 HC PERFUSION TIME - INITIAL BASE CHARGE: Performed by: THORACIC SURGERY (CARDIOTHORACIC VASCULAR SURGERY)

## 2024-08-27 PROCEDURE — 02BN0ZZ EXCISION OF PERICARDIUM, OPEN APPROACH: ICD-10-PCS | Performed by: THORACIC SURGERY (CARDIOTHORACIC VASCULAR SURGERY)

## 2024-08-27 PROCEDURE — 2020000001 HC ICU ROOM DAILY

## 2024-08-27 PROCEDURE — 0W9D00Z DRAINAGE OF PERICARDIAL CAVITY WITH DRAINAGE DEVICE, OPEN APPROACH: ICD-10-PCS | Performed by: THORACIC SURGERY (CARDIOTHORACIC VASCULAR SURGERY)

## 2024-08-27 PROCEDURE — 82375 ASSAY CARBOXYHB QUANT: CPT

## 2024-08-27 PROCEDURE — 2500000004 HC RX 250 GENERAL PHARMACY W/ HCPCS (ALT 636 FOR OP/ED)

## 2024-08-27 PROCEDURE — 37799 UNLISTED PX VASCULAR SURGERY: CPT | Performed by: THORACIC SURGERY (CARDIOTHORACIC VASCULAR SURGERY)

## 2024-08-27 PROCEDURE — 88305 TISSUE EXAM BY PATHOLOGIST: CPT | Performed by: PATHOLOGY

## 2024-08-27 PROCEDURE — 2500000004 HC RX 250 GENERAL PHARMACY W/ HCPCS (ALT 636 FOR OP/ED): Performed by: ANESTHESIOLOGIST ASSISTANT

## 2024-08-27 PROCEDURE — 85610 PROTHROMBIN TIME: CPT | Performed by: NURSE PRACTITIONER

## 2024-08-27 PROCEDURE — 71045 X-RAY EXAM CHEST 1 VIEW: CPT | Performed by: STUDENT IN AN ORGANIZED HEALTH CARE EDUCATION/TRAINING PROGRAM

## 2024-08-27 PROCEDURE — 84132 ASSAY OF SERUM POTASSIUM: CPT

## 2024-08-27 PROCEDURE — 85730 THROMBOPLASTIN TIME PARTIAL: CPT | Performed by: THORACIC SURGERY (CARDIOTHORACIC VASCULAR SURGERY)

## 2024-08-27 PROCEDURE — 2500000004 HC RX 250 GENERAL PHARMACY W/ HCPCS (ALT 636 FOR OP/ED): Performed by: THORACIC SURGERY (CARDIOTHORACIC VASCULAR SURGERY)

## 2024-08-27 PROCEDURE — 85049 AUTOMATED PLATELET COUNT: CPT | Performed by: THORACIC SURGERY (CARDIOTHORACIC VASCULAR SURGERY)

## 2024-08-27 PROCEDURE — 99233 SBSQ HOSP IP/OBS HIGH 50: CPT | Performed by: STUDENT IN AN ORGANIZED HEALTH CARE EDUCATION/TRAINING PROGRAM

## 2024-08-27 PROCEDURE — 88112 CYTOPATH CELL ENHANCE TECH: CPT | Performed by: PATHOLOGY

## 2024-08-27 PROCEDURE — 33025 INCISION OF HEART SAC: CPT | Performed by: THORACIC SURGERY (CARDIOTHORACIC VASCULAR SURGERY)

## 2024-08-27 PROCEDURE — 88112 CYTOPATH CELL ENHANCE TECH: CPT | Mod: TC | Performed by: THORACIC SURGERY (CARDIOTHORACIC VASCULAR SURGERY)

## 2024-08-27 PROCEDURE — 99232 SBSQ HOSP IP/OBS MODERATE 35: CPT

## 2024-08-27 PROCEDURE — 2780000003 HC OR 278 NO HCPCS: Performed by: THORACIC SURGERY (CARDIOTHORACIC VASCULAR SURGERY)

## 2024-08-27 PROCEDURE — 3600000012 HC PERFUSION TIME - EACH INCREMENTAL 1 MINUTE: Performed by: THORACIC SURGERY (CARDIOTHORACIC VASCULAR SURGERY)

## 2024-08-27 PROCEDURE — 3600000009 HC OR TIME - EACH INCREMENTAL 1 MINUTE - PROCEDURE LEVEL FOUR: Performed by: THORACIC SURGERY (CARDIOTHORACIC VASCULAR SURGERY)

## 2024-08-27 PROCEDURE — 3700000002 HC GENERAL ANESTHESIA TIME - EACH INCREMENTAL 1 MINUTE: Performed by: THORACIC SURGERY (CARDIOTHORACIC VASCULAR SURGERY)

## 2024-08-27 PROCEDURE — 2500000005 HC RX 250 GENERAL PHARMACY W/O HCPCS: Performed by: ANESTHESIOLOGIST ASSISTANT

## 2024-08-27 PROCEDURE — 80048 BASIC METABOLIC PNL TOTAL CA: CPT

## 2024-08-27 PROCEDURE — 36415 COLL VENOUS BLD VENIPUNCTURE: CPT

## 2024-08-27 PROCEDURE — 85610 PROTHROMBIN TIME: CPT | Performed by: THORACIC SURGERY (CARDIOTHORACIC VASCULAR SURGERY)

## 2024-08-27 PROCEDURE — 83735 ASSAY OF MAGNESIUM: CPT | Performed by: THORACIC SURGERY (CARDIOTHORACIC VASCULAR SURGERY)

## 2024-08-27 PROCEDURE — 3700000001 HC GENERAL ANESTHESIA TIME - INITIAL BASE CHARGE: Performed by: THORACIC SURGERY (CARDIOTHORACIC VASCULAR SURGERY)

## 2024-08-27 PROCEDURE — 83735 ASSAY OF MAGNESIUM: CPT

## 2024-08-27 PROCEDURE — 71045 X-RAY EXAM CHEST 1 VIEW: CPT

## 2024-08-27 RX ORDER — LIDOCAINE HCL/PF 100 MG/5ML
SYRINGE (ML) INTRAVENOUS AS NEEDED
Status: DISCONTINUED | OUTPATIENT
Start: 2024-08-27 | End: 2024-08-27

## 2024-08-27 RX ORDER — MIDAZOLAM HYDROCHLORIDE 1 MG/ML
INJECTION, SOLUTION INTRAMUSCULAR; INTRAVENOUS AS NEEDED
Status: DISCONTINUED | OUTPATIENT
Start: 2024-08-27 | End: 2024-08-27

## 2024-08-27 RX ORDER — ROCURONIUM BROMIDE 10 MG/ML
INJECTION, SOLUTION INTRAVENOUS AS NEEDED
Status: DISCONTINUED | OUTPATIENT
Start: 2024-08-27 | End: 2024-08-27

## 2024-08-27 RX ORDER — NOREPINEPHRINE BITARTRATE 0.03 MG/ML
0-3 INJECTION, SOLUTION INTRAVENOUS CONTINUOUS
Status: CANCELLED | OUTPATIENT
Start: 2024-08-27

## 2024-08-27 RX ORDER — NORETHINDRONE AND ETHINYL ESTRADIOL 0.5-0.035
KIT ORAL CONTINUOUS PRN
Status: DISCONTINUED | OUTPATIENT
Start: 2024-08-27 | End: 2024-08-27

## 2024-08-27 RX ORDER — ETOMIDATE 2 MG/ML
INJECTION INTRAVENOUS AS NEEDED
Status: DISCONTINUED | OUTPATIENT
Start: 2024-08-27 | End: 2024-08-27

## 2024-08-27 RX ORDER — BUPIVACAINE HYDROCHLORIDE 2.5 MG/ML
INJECTION, SOLUTION EPIDURAL; INFILTRATION; INTRACAUDAL AS NEEDED
Status: DISCONTINUED | OUTPATIENT
Start: 2024-08-27 | End: 2024-08-27 | Stop reason: HOSPADM

## 2024-08-27 RX ORDER — FENTANYL CITRATE 50 UG/ML
INJECTION, SOLUTION INTRAMUSCULAR; INTRAVENOUS AS NEEDED
Status: DISCONTINUED | OUTPATIENT
Start: 2024-08-27 | End: 2024-08-27

## 2024-08-27 RX ORDER — CEFAZOLIN 1 G/1
INJECTION, POWDER, FOR SOLUTION INTRAVENOUS AS NEEDED
Status: DISCONTINUED | OUTPATIENT
Start: 2024-08-27 | End: 2024-08-27

## 2024-08-27 RX ADMIN — AZITHROMYCIN MONOHYDRATE 500 MG: 500 INJECTION, POWDER, LYOPHILIZED, FOR SOLUTION INTRAVENOUS at 10:10

## 2024-08-27 RX ADMIN — SODIUM CHLORIDE, POTASSIUM CHLORIDE, SODIUM LACTATE AND CALCIUM CHLORIDE 100 ML/HR: 600; 310; 30; 20 INJECTION, SOLUTION INTRAVENOUS at 08:17

## 2024-08-27 RX ADMIN — KETOROLAC TROMETHAMINE 15 MG: 30 INJECTION, SOLUTION INTRAMUSCULAR at 06:29

## 2024-08-27 RX ADMIN — KETOROLAC TROMETHAMINE 15 MG: 30 INJECTION, SOLUTION INTRAMUSCULAR at 16:11

## 2024-08-27 RX ADMIN — KETOROLAC TROMETHAMINE 15 MG: 30 INJECTION, SOLUTION INTRAMUSCULAR at 23:45

## 2024-08-27 RX ADMIN — CEFTRIAXONE SODIUM 1 G: 1 INJECTION, SOLUTION INTRAVENOUS at 05:06

## 2024-08-27 SDOH — HEALTH STABILITY: MENTAL HEALTH: CURRENT SMOKER: 0

## 2024-08-27 ASSESSMENT — PAIN SCALES - GENERAL
PAINLEVEL_OUTOF10: 0 - NO PAIN
PAINLEVEL_OUTOF10: 7
PAINLEVEL_OUTOF10: 0 - NO PAIN
PAINLEVEL_OUTOF10: 2
PAINLEVEL_OUTOF10: 10 - WORST POSSIBLE PAIN
PAINLEVEL_OUTOF10: 0 - NO PAIN
PAINLEVEL_OUTOF10: 2
PAIN_LEVEL: 0
PAINLEVEL_OUTOF10: 0 - NO PAIN
PAINLEVEL_OUTOF10: 10 - WORST POSSIBLE PAIN
PAINLEVEL_OUTOF10: 0 - NO PAIN

## 2024-08-27 ASSESSMENT — PAIN DESCRIPTION - DESCRIPTORS
DESCRIPTORS: ACHING
DESCRIPTORS: ACHING

## 2024-08-27 ASSESSMENT — ENCOUNTER SYMPTOMS
VOMITING: 0
ALTERED MENTAL STATUS: 0
LIGHT-HEADEDNESS: 0
NAUSEA: 0
SHORTNESS OF BREATH: 0
WEAKNESS: 1
DYSPNEA ON EXERTION: 0
FREQUENCY: 0
IRREGULAR HEARTBEAT: 0
MYALGIAS: 0
COUGH: 0
FOCAL WEAKNESS: 0
DOUBLE VISION: 0
STIFFNESS: 0
ORTHOPNEA: 0
BLURRED VISION: 0
POOR WOUND HEALING: 0
DIZZINESS: 0
BLOATING: 0
PALPITATIONS: 0
MUSCLE CRAMPS: 0
DECREASED APPETITE: 0
NAIL CHANGES: 0

## 2024-08-27 ASSESSMENT — PAIN - FUNCTIONAL ASSESSMENT
PAIN_FUNCTIONAL_ASSESSMENT: 0-10

## 2024-08-27 ASSESSMENT — PAIN DESCRIPTION - LOCATION: LOCATION: INCISION

## 2024-08-27 ASSESSMENT — COGNITIVE AND FUNCTIONAL STATUS - GENERAL: MOBILITY SCORE: 24

## 2024-08-27 NOTE — BRIEF OP NOTE
Date: 2024  OR Location: [facility] OR    Name: Ayesha Nova, : 1970, Age: 54 y.o., MRN: [id number], Sex: female    Diagnosis  Pre-op Diagnosis      * Pericardial effusion (HHS-HCC) [I31.39]     * Pericardial effusion with cardiac tamponade (HHS-HCC) [I31.39, I31.4]     * Malignant pericardial effusion in diseases classified elsewhere (Multi) [I31.31] Post-op Diagnosis     * Pericardial effusion (HHS-HCC) [I31.39]     * Pericardial effusion with cardiac tamponade (HHS-HCC) [I31.39, I31.4]     * Malignant pericardial effusion in diseases classified elsewhere (Multi) [I31.31]     Procedures  CREATION PERICARDIAL WINDOW (SUBXYPHOID APPROACH)  34869 - UT CRTJ PERICARDIAL WINDOW/PRTL RESECJ W/DRG/BX  2 cm sub-xyphoid incision  2. Drainage of pericardial infusion with insertion of mediastinal chest tube  3. Closure of sub-xyphoid incision    Surgeons      * Beto Giles - Primary    Resident/Fellow/Other Assistant:  Surgeons and Role:  * No surgeons found with a matching role *  Moi ROANTES-AUDREY   Procedure Summary  Anesthesia: General  ASA: ASA status not filed in the log.  Anesthesia Staff: Anesthesiologist: Adonay Mcgregor MD  C-AA: EDILMA Mathis  Perfusionist: Adri Thomson  Estimated Blood Loss: 25 mL  Intra-op Medications: * Intraprocedure medication information is unavailable because the case start and end events have not been set *           Anesthesia Record               Intraprocedure I/O Totals          Intake    NaCl 0.9 % bolus 500.00 mL    lactated Ringer's infusion 800.00 mL    Total Intake 1300 mL       Output    Urine 125 mL    Total Output 125 mL       Net    Net Volume 1175 mL          Specimen:   ID Type Source Tests Collected by Time   1 : PERICARDIAL FLUID Non-Gynecologic Cytology PERICARDIAL FINE NEEDLE ASPIRATION CYTOLOGY CONSULTATION (NON-GYNECOLOGIC) Beto Giles MD 2024 1418   2 : PERICARDIUM Tissue HEART PERICARDIUM BIOPSY SURGICAL PATHOLOGY EXAM Beto Giles,  MD 8/27/2024 1419        Staff:   Bradulator: Nita Fernandes Person: Dana Yanes Scrub: Holly Yanes Circulator: Gabi          Findings: Pericardial effusion    Complications:  None; patient tolerated the procedure well.     Disposition: PACU - hemodynamically stable.  Condition: stable  Specimens Collected:   ID Type Source Tests Collected by Time   1 : PERICARDIAL FLUID Non-Gynecologic Cytology PERICARDIAL FINE NEEDLE ASPIRATION CYTOLOGY CONSULTATION (NON-GYNECOLOGIC) Beto Giles MD 8/27/2024 1418   2 : PERICARDIUM Tissue HEART PERICARDIUM BIOPSY SURGICAL PATHOLOGY EXAM Beto Giles MD 8/27/2024 1419     Attending Attestation: I was present for the entire procedure.    Beto Giles  Phone Number: [phone]

## 2024-08-27 NOTE — CARE PLAN
The clinical goals for the shift include pt will remain hemodynamically stable    Problem: ACS/CP/NSTEMI/STEMI  Goal: Chest pain managed (free from pain or at acceptable level)  8/27/2024 0639 by Zinieu Zrankenon Nina Sebe, RN  Outcome: Progressing  8/27/2024 0637 by Zinieu Zrankenon Nina Sebe, RN  Outcome: Progressing  Goal: Lab values return to normal range  8/27/2024 0639 by Zinieu Zrankenon Nina Sebe, RN  Outcome: Progressing  8/27/2024 0637 by Zinieu Zrankenon Nina Sebe, RN  Outcome: Progressing  Goal: Promote self management  8/27/2024 0639 by Zinieu Zrankenon Nina Sebe, RN  Outcome: Progressing  8/27/2024 0637 by Zinieu Zrankenon Nina Sebe, RN  Outcome: Progressing  Goal: Serial ECG will return to baseline  8/27/2024 0639 by Zinieu Zrankenon Nina Sebe, RN  Outcome: Progressing  8/27/2024 0637 by Zinieu Zrankenon Nina Sebe, RN  Outcome: Progressing  Goal: Verbalize understanding of procedures/devices  8/27/2024 0639 by Zinieu Zrankenon Nina Sebe, RN  Outcome: Progressing  8/27/2024 0637 by Zinieu Zrankenon Nina Sebe, RN  Outcome: Progressing  Goal: Wean vasopressors/achieve hemodynamic stability  8/27/2024 0639 by Zinieu Zrankenon Nina Sebe, RN  Outcome: Progressing  8/27/2024 0637 by Zinieu Zrankenon Nina Sebe, RN  Outcome: Progressing     Problem: Arrythmia/Dysrhythmia  Goal: Lab values return to normal range  8/27/2024 0639 by Zinieu Zrankenon Nina Sebe, RN  Outcome: Progressing  8/27/2024 0637 by Zinieu Zrankenon Nina Sebe, RN  Outcome: Progressing  Goal: No evidence of post procedure complications  8/27/2024 0639 by Zinieu Zrankenon Nina Sebe, RN  Outcome: Progressing  8/27/2024 0637 by Zinieu Zrankenon Nina Sebe, RN  Outcome: Progressing  Goal: Promote self management  8/27/2024 0639 by Zinieu Zrankenon Nina Sebe, RN  Outcome: Progressing  8/27/2024 0637 by Zinieu Zrankenon Nina Sebe, RN  Outcome: Progressing  Goal: Serial ECG will return to baseline  8/27/2024 0639 by Zinieu Zrankenon Nina Sebe,  RN  Outcome: Progressing  8/27/2024 0637 by Zinieu Zrankenon Nina Sebe, RN  Outcome: Progressing  Goal: Verbalize understanding of procedures/devices  8/27/2024 0639 by Zinieu Zrankenon Nina Sebe, RN  Outcome: Progressing  8/27/2024 0637 by Zinieu Zrankenon Nina Sebe, RN  Outcome: Progressing  Goal: Vital signs return to baseline  8/27/2024 0639 by Zinieu Zrankenon Nina Sebe, RN  Outcome: Progressing  8/27/2024 0637 by Zinieu Zrankenon Nina Sebe, RN  Outcome: Progressing  Goal: Care and maintenance of device (specify)  8/27/2024 0639 by Zinieu Zrankenon Nina Sebe, RN  Outcome: Progressing  8/27/2024 0637 by Zinieu Zrankenon Nina Sebe, RN  Outcome: Progressing     Problem: Cardiac catheterization  Goal: Free from dysrhythmias  8/27/2024 0639 by Zinieu Zrankenon Nina Sebe, RN  Outcome: Progressing  8/27/2024 0637 by Zinieu Zrankenon Nina Sebe, RN  Outcome: Progressing  Goal: Free from pain  8/27/2024 0639 by Zinieu Zrankenon Nina Sebe, RN  Outcome: Progressing  8/27/2024 0637 by Zinieu Zrankenon Nina Sebe, RN  Outcome: Progressing  Goal: No evidence of post procedure complications  8/27/2024 0639 by Zinieu Zrankenon Nina Sebe, RN  Outcome: Progressing  8/27/2024 0637 by Zinieu Zrankenon Nina Sebe, RN  Outcome: Progressing  Goal: Promote self management  8/27/2024 0639 by Zinieu Zrankenon Nina Sebe, RN  Outcome: Progressing  8/27/2024 0637 by Zinieu Zrankenon Nina Sebe, RN  Outcome: Progressing  Goal: Verbalize understanding of procedure  8/27/2024 0639 by Zinieu Zrankenon Nina Sebe, RN  Outcome: Progressing  8/27/2024 0637 by Zinieu Zrankenon Nina Sebe, RN  Outcome: Progressing  Goal: Care and maintenance of device (specify)  8/27/2024 0639 by Zinieu Zrankenon Nina Sebe, RN  Outcome: Progressing  8/27/2024 0637 by Zinieu Zrankenon Nina Sebe, RN  Outcome: Progressing     Problem: Skin  Goal: Decreased wound size/increased tissue granulation at next dressing change  8/27/2024 0639 by Zinieu Zrankenon Nina Sebe,  RN  Outcome: Progressing  8/27/2024 0637 by Zinieu Zrankenon Nina Sebe, RN  Outcome: Progressing  Goal: Participates in plan/prevention/treatment measures  8/27/2024 0639 by Zinieu Zrankenon Nina Sebe, RN  Outcome: Progressing  8/27/2024 0637 by Zinieu Zrankenon Nina Sebe, RN  Outcome: Progressing  Goal: Prevent/manage excess moisture  8/27/2024 0639 by Zinieu Zrankenon Nina Sebe, RN  Outcome: Progressing  8/27/2024 0637 by Zinieu Zrankenon Nina Sebe, RN  Outcome: Progressing  Goal: Prevent/minimize sheer/friction injuries  8/27/2024 0639 by Zinieu Zrankenon Nina Sebe, RN  Outcome: Progressing  8/27/2024 0637 by Zinieu Zrankenon Nina Sebe, RN  Outcome: Progressing  Goal: Promote/optimize nutrition  8/27/2024 0639 by Zinieu Zrankenon Nina Sebe, RN  Outcome: Progressing  8/27/2024 0637 by Zinieu Zrankenon Nina Sebe, RN  Outcome: Progressing  Goal: Promote skin healing  8/27/2024 0639 by Zinieu Zrankenon Nina Sebe, RN  Outcome: Progressing  8/27/2024 0637 by Zinieu Zrankenon Nina Sebe, RN  Outcome: Progressing     Problem: Fall/Injury  Goal: Not fall by end of shift  8/27/2024 0639 by Zinieu Zrankenon Nina Sebe, RN  Outcome: Progressing  8/27/2024 0637 by Zinieu Zrankenon Nina Sebe, RN  Outcome: Progressing  Goal: Be free from injury by end of the shift  8/27/2024 0639 by Zinieu Zrankenon Nina Sebe, RN  Outcome: Progressing  8/27/2024 0637 by Zinieu Zrankenon Nina Sebe, RN  Outcome: Progressing  Goal: Verbalize understanding of personal risk factors for fall in the hospital  8/27/2024 0639 by Zinieu Zrankenon Nina Sebe, RN  Outcome: Progressing  8/27/2024 0637 by Zinieu Zrankenon Mary Sebe, RN  Outcome: Progressing  Goal: Verbalize understanding of risk factor reduction measures to prevent injury from fall in the home  8/27/2024 0639 by Zinieu Zrankenon Nina Sebe, RN  Outcome: Progressing  8/27/2024 0637 by Zinieu Zrankenon Nina Sebe, RN  Outcome: Progressing  Goal: Use assistive devices by end of the shift  8/27/2024  0639 by Zinieu Zrankenon Nina Sebe, RN  Outcome: Progressing  8/27/2024 0637 by Zinieu Zrankenon Nina Sebe, RN  Outcome: Progressing  Goal: Pace activities to prevent fatigue by end of the shift  8/27/2024 0639 by Zinieu Zrankenon Nina Sebe, RN  Outcome: Progressing  8/27/2024 0637 by Zinieu Zrankenon Nina Sebe, RN  Outcome: Progressing     Problem: Safety - Adult  Goal: Free from fall injury  8/27/2024 0639 by Zinieu Zrankenon Nina Sebe, RN  Outcome: Progressing  8/27/2024 0637 by Zinieu Zrankenon Nina Sebe, RN  Outcome: Progressing     Problem: Chronic Conditions and Co-morbidities  Goal: Patient's chronic conditions and co-morbidity symptoms are monitored and maintained or improved  8/27/2024 0639 by Zinieu Zrankenon Nina Sebe, RN  Outcome: Progressing  8/27/2024 0637 by Zinieu Zrankenon Nina Sebe, RN  Outcome: Progressing     Problem: Pain  Goal: Takes deep breaths with improved pain control throughout the shift  8/27/2024 0639 by Zinieu Zrankenon Nina Sebe, RN  Outcome: Progressing  8/27/2024 0637 by Zinieu Zrankenon Nina Sebe, RN  Outcome: Progressing  Goal: Turns in bed with improved pain control throughout the shift  8/27/2024 0639 by Zinieu Zrankenon Nina Sebe, RN  Outcome: Progressing  8/27/2024 0637 by Zinieu Zrankenon Nina Sebe, RN  Outcome: Progressing  Goal: Walks with improved pain control throughout the shift  8/27/2024 0639 by Zinieu Zrankenon Nina Sebe, RN  Outcome: Progressing  8/27/2024 0637 by Zinieu Zrankenon Nina Sebe, RN  Outcome: Progressing  Goal: Performs ADL's with improved pain control throughout shift  8/27/2024 0639 by Zinieu Zrankenon Nina Sebe, RN  Outcome: Progressing  8/27/2024 0637 by Zinieu Zrankenon Nina Sebe, RN  Outcome: Progressing  Goal: Participates in PT with improved pain control throughout the shift  8/27/2024 0639 by Zinieu Zrankenon Nina Sebe, BERNARD  Outcome: Progressing  8/27/2024 0637 by Zinieu Zrankenon Nina Sebe, RN  Outcome: Progressing  Goal: Free from opioid side  effects throughout the shift  8/27/2024 0639 by Zinieu Zrankenon Nina Sebe, RN  Outcome: Progressing  8/27/2024 0637 by Zinieu Zrankenon Nina Sebe, RN  Outcome: Progressing  Goal: Free from acute confusion related to pain meds throughout the shift  8/27/2024 0639 by Zinieu Zrankenon Nina Sebe, RN  Outcome: Progressing  8/27/2024 0637 by Zinieu Zrankenon Nina Sebe, RN  Outcome: Progressing

## 2024-08-27 NOTE — ANESTHESIA PREPROCEDURE EVALUATION
Patient: Ayesha Nova    Procedure Information       Anesthesia Start Date/Time: 08/27/24 1255    Procedure: CREATION PERICARDIAL WINDOW (SUBXYPHOID APPROACH) (Chest) - Subxyphoid approach    Location: [facility] / [facility]    Surgeons: Beto Giles MD            Relevant Problems   Cardiac   (+) Chest pain, unspecified type   (+) Hyperlipidemia      Pulmonary   (+) Malignant neoplasm metastatic to both lungs (Multi)   (+) Multiple pulmonary nodules   (+) Pneumonia due to infectious organism      Neuro   (+) Cerebrovascular accident (CVA) (Multi)      Liver   (+) Gallbladder sludge   (+) Pancreatitis, acute (HHS-HCC)      ID   (+) Pneumonia due to infectious organism   (+) Viral upper respiratory tract infection with cough      GYN   (+) Malignant neoplasm of upper-inner quadrant of right female breast (Multi)       Clinical information reviewed:   Tobacco  Allergies  Meds   Med Hx  Surg Hx   Fam Hx          NPO Detail:  No data recorded     Physical Exam    Airway  Mallampati: II  TM distance: >3 FB  Neck ROM: full     Cardiovascular   Rhythm: regular     Dental    Pulmonary    Abdominal        Anesthesia Plan    History of general anesthesia?: yes  History of complications of general anesthesia?: no    ASA 4 - emergent     general   (Alysia DIAN)  The patient is not a current smoker.  Patient was not previously instructed to abstain from smoking on day of procedure.  Patient did not smoke on day of procedure.    intravenous induction   Anesthetic plan and risks discussed with patient.  Use of blood products discussed with patient who.    Plan discussed with CRNA.

## 2024-08-27 NOTE — CONSULTS
PULMONOLOGY CONSULT NOTE       PATIENT NAME: Ayesha Nova  MRN: [id number]    SERVICE DATE:  8/27/2024  SERVICE TIME:  1:53 PM    REASON FOR CONSULT: Loculated Pleural Effusion    Ayesha Nova is a 54 y.o. female on day 2 of admission presenting with Chest pain, unspecified type.  ASSESSMENT & PLAN    #Recurrent pericardial effusion likely 2/2 metastatic breast cancer   #Right lung opacity, suspected pneumonia vs. metastatic lesion  #Right lobar pulmonary artery encasement 2/2 R upper lobe and hilar mass  #Worsening loculated moderate right pleural effusion   #Hypotension   #Tachycardia    -Patient's malignant effusion secondary to metastatic breast cancer that she was diagnosed 8 years ago.  Patient had denied pursuing any medical or surgical interventions for her cancer.  -Patient had a pericardial window in May 2024  -Imaging shows recurrence of the pericardial effusion along with what appears to be a loculated versus malignant pleural effusion    PLAN:  -Patient underwent thoracentesis.  Studies pending.  -Plan for a xiphoid pericardial window with CT surgery today  -I suspect that due to patient's advancement of her cancer, she is can have recurrence of the pericardial effusion as well as the pleural effusion.  Patient may need a Pleurx catheter down the road.  -Continue pain management with Toradol, Dilaudid  -Symptomatic control for cough with Tessalon Perles  -Patient's overall prognosis is very guarded.         HPI    Ayesha Nova is a 54 y.o. female with medical history significant for metastatic breast cancer, cardiac tamponade status post pericardial window, hypothyroidism presenting to [facility] on 8/25 for chest pain that was started few hours prior to arrival.  During admission patient was found to have a large pericardial effusion as well as a loculated pleural effusion and was admitted for further evaluation.  Of note, patient was admitted back in May 2024 for similar episodes and at that time she underwent a  pericardial window procedure.  Patient noted that since then she was having worsening chest pain and would usually take ibuprofen to relieve the pain however this time around she was unable to show symptoms which prompted her to come to the emergency department for further evaluation.  Patient did recently also finish a 14-day course of doxycycline for a suspected pneumonia.  Pulmonology team was consulted for recurrent right pleural effusion.    Daily progress:  August 27, 2024: Patient had an uneventful night.  She has no fever, chills, rigors.  Breathing has improved.  Patient is scheduled for a pericardial window today.  Chest x-ray was personally reviewed and independently interpreted and residual right-sided pleural effusion and pericardial effusion.  This was discussed with primary team.      OBJECTIVE    Vitals:    08/27/24 0800 08/27/24 0900 08/27/24 1000 08/27/24 1100   BP: 119/61 95/53  105/61   BP Location:       Patient Position:       Pulse: 102 102 (!) 121 110   Resp: 23 26 (!) 28 (!) 29   Temp:       TempSrc:       SpO2: 95% 96%  98%   Weight:       Height:          Results from last 7 days   Lab Units 08/27/24 0528 08/25/24  0601 08/25/24  0058   WBC AUTO x10*3/uL 8.0   < > 11.0   HEMOGLOBIN g/dL 10.6*   < > 12.0   HEMATOCRIT % 33.5*   < > 36.9   PLATELETS AUTO x10*3/uL 362   < > 397   NEUTROS PCT AUTO %  --   --  76.8   LYMPHS PCT AUTO %  --   --  15.1   MONOS PCT AUTO %  --   --  6.8   EOS PCT AUTO %  --   --  0.5    < > = values in this interval not displayed.     Results from last 7 days   Lab Units 08/27/24 0528 08/25/24  0601 08/25/24  0058   SODIUM mmol/L 135*   < > 134*   POTASSIUM mmol/L 4.9   < > 4.0   CHLORIDE mmol/L 102   < > 99   CO2 mmol/L 27   < > 23   BUN mg/dL 13   < > 15   CREATININE mg/dL 0.70   < > 0.66   CALCIUM mg/dL 9.0   < > 9.9   PROTEIN TOTAL g/dL  --   --  7.2   BILIRUBIN TOTAL mg/dL  --   --  0.8   ALK PHOS U/L  --   --  45   ALT U/L  --   --  6*   AST U/L  --   --  15    GLUCOSE mg/dL 93   < > 107*    < > = values in this interval not displayed.       Scheduled Medications  [Transfer Hold] cefTRIAXone, 1 g, intravenous, q24h  [Held by provider] heparin (porcine), 5,000 Units, subcutaneous, q8h  [Transfer Hold] perflutren lipid microspheres, 0.5-10 mL of dilution, intravenous, Once in imaging  [Transfer Hold] perflutren protein A microsphere, 0.5 mL, intravenous, Once in imaging  [Transfer Hold] polyethylene glycol, 17 g, oral, Daily  [Transfer Hold] sulfur hexafluoride microsphr, 2 mL, intravenous, Once in imaging           Physical Exam   Physical Exam:  General:  Pleasant and cooperative. No apparent distress.  HEENT:  Normocephalic, atraumatic, mucus membranes moist.   Neck:  Trachea midline.  No JVD.    Chest: Tachycardic  Abdomen: Bowel sounds present in all four quadrants, abdomen is soft, non-tender, non-distended.  Extremities:  No lower extremity edema or cyanosis.   Neurological:  AAOx3. No focal deficits.  Skin:  Warm and dry.

## 2024-08-27 NOTE — ANESTHESIA PROCEDURE NOTES
Peripheral IV  Date/Time: 8/27/2024 1:45 PM  Inserted by: EDILMA Mathis    Placement  Needle size: 20 G  Laterality: left  Location: hand  Local anesthetic: topical anesthetic  Site prep: alcohol  Technique: anatomical landmarks  Attempts: 2  Difficult Venous Access: Yes

## 2024-08-27 NOTE — ANESTHESIA PROCEDURE NOTES
Arterial Line:    Date/Time: 8/27/2024 1:20 PM    Staffing  Performed: attending   Authorized by: Adonay Mcgregor MD    Performed by: EDILMA Mathis    An arterial line was placed. Procedure performed using ultrasound guidance.in the OR for the following indication(s): continuous blood pressure monitoring and blood sampling needed.    A 20 gauge (size), 1 and 3/4 inch (length), Arrow (type) catheter was placed into the Left radial artery, secured by Tegaderm,   Seldinger technique used.  Events:  patient tolerated procedure well with no complications.

## 2024-08-27 NOTE — ANESTHESIA PROCEDURE NOTES
Airway  Date/Time: 8/27/2024 1:28 PM  Urgency: elective    Airway not difficult    Staffing  Performed: EDILMA   Authorized by: Adonay Mcgregor MD    Performed by: EDILMA Mathis  Patient location during procedure: OR    Indications and Patient Condition  Indications for airway management: anesthesia  Spontaneous Ventilation: absent  Sedation level: deep  Preoxygenated: yes  Mask difficulty assessment: 1 - vent by mask  Planned trial extubation    Final Airway Details  Final airway type: endotracheal airway      Successful airway: ETT  Cuffed: yes   Successful intubation technique: video laryngoscopy (pineda)  Facilitating devices/methods: intubating stylet  Endotracheal tube insertion site: oral  Blade: Sandrine  Blade size: #3  ETT size (mm): 7.0  Cormack-Lehane Classification: grade I - full view of glottis  Placement verified by: chest auscultation and capnometry   Measured from: lips  ETT to lips (cm): 22  Number of attempts at approach: 1  Number of other approaches attempted: 0

## 2024-08-27 NOTE — ADDENDUM NOTE
Addendum  created 08/27/24 1554 by Adonay Mcgregor MD    Review and Sign - Ready for Procedure

## 2024-08-27 NOTE — CARE PLAN
The clinical goals for the shift include pt will remain hemodynamically stable    Problem: ACS/CP/NSTEMI/STEMI  Goal: Chest pain managed (free from pain or at acceptable level)  Outcome: Progressing  Goal: Lab values return to normal range  Outcome: Progressing  Goal: Promote self management  Outcome: Progressing  Goal: Serial ECG will return to baseline  Outcome: Progressing  Goal: Verbalize understanding of procedures/devices  Outcome: Progressing  Goal: Wean vasopressors/achieve hemodynamic stability  Outcome: Progressing     Problem: Arrythmia/Dysrhythmia  Goal: Lab values return to normal range  Outcome: Progressing  Goal: No evidence of post procedure complications  Outcome: Progressing  Goal: Promote self management  Outcome: Progressing  Goal: Serial ECG will return to baseline  Outcome: Progressing  Goal: Verbalize understanding of procedures/devices  Outcome: Progressing  Goal: Vital signs return to baseline  Outcome: Progressing  Goal: Care and maintenance of device (specify)  Outcome: Progressing     Problem: Cardiac catheterization  Goal: Free from dysrhythmias  Outcome: Progressing  Goal: Free from pain  Outcome: Progressing  Goal: No evidence of post procedure complications  Outcome: Progressing  Goal: Promote self management  Outcome: Progressing  Goal: Verbalize understanding of procedure  Outcome: Progressing  Goal: Care and maintenance of device (specify)  Outcome: Progressing     Problem: Respiratory  Goal: Clear secretions with interventions this shift  Outcome: Progressing  Goal: Minimize anxiety/maximize coping throughout shift  Outcome: Progressing  Goal: Minimal/no exertional discomfort or dyspnea this shift  Outcome: Progressing  Goal: No signs of respiratory distress (eg. Use of accessory muscles. Peds grunting)  Outcome: Progressing  Goal: Patent airway maintained this shift  Outcome: Progressing  Goal: Tolerate mechanical ventilation evidenced by VS/agitation level this shift  Outcome:  Progressing  Goal: Tolerate pulmonary toileting this shift  Outcome: Progressing  Goal: Verbalize decreased shortness of breath this shift  Outcome: Progressing  Goal: Wean oxygen to maintain O2 saturation per order/standard this shift  Outcome: Progressing  Goal: Increase self care and/or family involvement in next 24 hours  Outcome: Progressing     Problem: Fall/Injury  Goal: Not fall by end of shift  Outcome: Progressing  Goal: Be free from injury by end of the shift  Outcome: Progressing  Goal: Verbalize understanding of personal risk factors for fall in the hospital  Outcome: Progressing  Goal: Verbalize understanding of risk factor reduction measures to prevent injury from fall in the home  Outcome: Progressing  Goal: Use assistive devices by end of the shift  Outcome: Progressing  Goal: Pace activities to prevent fatigue by end of the shift  Outcome: Progressing     Problem: Pain - Adult  Goal: Verbalizes/displays adequate comfort level or baseline comfort level  Outcome: Progressing     Problem: Safety - Adult  Goal: Free from fall injury  Outcome: Progressing     Problem: Discharge Planning  Goal: Discharge to home or other facility with appropriate resources  Outcome: Progressing     Problem: Chronic Conditions and Co-morbidities  Goal: Patient's chronic conditions and co-morbidity symptoms are monitored and maintained or improved  Outcome: Progressing

## 2024-08-27 NOTE — ANESTHESIA POSTPROCEDURE EVALUATION
Patient: Ayesha Nova    Procedure Summary       Date: 08/27/24 Room / Location: [facility]  / [facility]    Anesthesia Start: 1255 Anesthesia Stop:     Procedure: CREATION PERICARDIAL WINDOW (SUBXYPHOID APPROACH) (Chest) Diagnosis:       Pericardial effusion (HHS-HCC)      Pericardial effusion with cardiac tamponade (HHS-HCC)      Malignant pericardial effusion in diseases classified elsewhere (Multi)      (Pericardial effusion (HHS-HCC) [I31.39])      (Pericardial effusion with cardiac tamponade (HHS-HCC) [I31.39, I31.4])      (Malignant pericardial effusion in diseases classified elsewhere (Multi) [I31.31])    Surgeons: Beto Giles MD Responsible Provider: Adonay Mcgregor MD    Anesthesia Type: general ASA Status: 4 - Emergent            Anesthesia Type: general    Vitals Value Taken Time   /65 08/27/24 1459   Temp 36.3 08/27/24 1459   Pulse 107 08/27/24 1459   Resp 16 08/27/24 1459   SpO2 100 08/27/24 1459       Anesthesia Post Evaluation    Patient location during evaluation: ICU  Patient participation: complete - patient cannot participate  Level of consciousness: sleepy but conscious  Pain score: 0  Pain management: adequate  Airway patency: patent  Cardiovascular status: hemodynamically stable  Respiratory status: face mask  Hydration status: acceptable  Postoperative Nausea and Vomiting: none        There were no known notable events for this encounter.

## 2024-08-27 NOTE — PROGRESS NOTES
Cardiac Surgery  Progress Note     Ayesha Nova is a 54 y.o. female on day 2 of admission presenting with Chest pain, unspecified type.    Subjective     Interval HPI: Seen and assessed patient this AM while they were laying in bed s/p pericardial window with Dr. Giles. Uneventful OR. Mediastinal drain in place.     Review of Systems   Constitutional: Negative for decreased appetite and malaise/fatigue.   HENT:  Negative for congestion.    Eyes:  Negative for blurred vision and double vision.   Cardiovascular:  Negative for chest pain, dyspnea on exertion, irregular heartbeat, leg swelling, orthopnea and palpitations.   Respiratory:  Negative for cough and shortness of breath.    Endocrine: Negative for cold intolerance and heat intolerance.   Skin:  Negative for nail changes, poor wound healing and rash.   Musculoskeletal:  Negative for arthritis, muscle cramps, muscle weakness, myalgias and stiffness.   Gastrointestinal:  Negative for bloating, nausea and vomiting.   Genitourinary:  Negative for frequency, hesitancy and urgency.   Neurological:  Positive for weakness. Negative for dizziness, focal weakness and light-headedness.   Psychiatric/Behavioral:  Negative for altered mental status.    Allergic/Immunologic: Negative for environmental allergies.         Objective   Physical Exam  Physical Exam  Vitals and nursing note reviewed.   Constitutional:       General: She is not in acute distress.     Appearance: Normal appearance. She is not ill-appearing.   HENT:      Head: Normocephalic and atraumatic.      Nose: Nose normal.      Mouth/Throat:      Mouth: Mucous membranes are moist.      Pharynx: Oropharynx is clear.   Eyes:      Extraocular Movements: Extraocular movements intact.      Conjunctiva/sclera: Conjunctivae normal.      Pupils: Pupils are equal, round, and reactive to light.   Cardiovascular:      Rate and Rhythm: Normal rate and regular rhythm.      Pulses: Normal pulses.      Heart sounds: Normal  heart sounds, S1 normal and S2 normal. No murmur heard.     No systolic murmur is present.      No friction rub. No gallop.   Pulmonary:      Effort: Pulmonary effort is normal.      Breath sounds: Normal breath sounds.   Abdominal:      General: Abdomen is flat. Bowel sounds are normal. There is no distension.      Palpations: Abdomen is soft.   Musculoskeletal:         General: Normal range of motion.      Cervical back: Normal range of motion and neck supple.      Right lower leg: No edema.      Left lower leg: No edema.   Skin:     General: Skin is warm and dry.      Capillary Refill: Capillary refill takes less than 2 seconds.      Findings: No lesion.      Nails: There is no clubbing.   Neurological:      General: No focal deficit present.      Mental Status: She is alert and oriented to person, place, and time. Mental status is at baseline.      Cranial Nerves: Cranial nerves 2-12 are intact.      Sensory: Sensation is intact.      Motor: Motor function is intact.   Psychiatric:         Attention and Perception: Attention and perception normal.         Mood and Affect: Mood normal.         Speech: Speech normal.         Behavior: Behavior normal.         Thought Content: Thought content normal.         Cognition and Memory: Cognition and memory normal.         Judgment: Judgment normal.         Last Recorded Vitals  Vitals:    08/27/24 1200 08/27/24 1500 08/27/24 1515 08/27/24 1530   BP: 107/59 115/60     BP Location:  Left arm     Patient Position:  Lying     Pulse: 107 104     Resp: 24 14     Temp:  37.2 °C (99 °F)     TempSrc:  Temporal     SpO2: 95% 100% 100% 100%   Weight:       Height:            Intake/Output last 3 Shifts:  I/O last 3 completed shifts:  In: 2031.7 (33.5 mL/kg) [I.V.:1431.7 (23.6 mL/kg); IV Piggyback:600]  Out: 1000 (16.5 mL/kg) [Drains:1000]  Weight: 60.6 kg     Inpatient Medications  cefTRIAXone, 1 g, intravenous, q24h  [Held by provider] heparin (porcine), 5,000 Units, subcutaneous,  q8h  perflutren lipid microspheres, 0.5-10 mL of dilution, intravenous, Once in imaging  polyethylene glycol, 17 g, oral, Daily      Continuous medications     PRN medications  PRN medications: acetaminophen **OR** acetaminophen **OR** acetaminophen, benzonatate, guaiFENesin, HYDROmorphone, ibuprofen, ipratropium-albuteroL, ketorolac, melatonin, ondansetron     LDA:       Relevant Results  Lab Review  Results from last 7 days   Lab Units 08/27/24  1422 08/27/24  0528   WBC AUTO x10*3/uL  --  8.0   HEMOGLOBIN g/dL  --  10.6*   HEMATOCRIT %  --  33.5*   PLATELETS AUTO x10*3/uL 307 362     Results from last 7 days   Lab Units 08/27/24  0528 08/25/24  0601 08/25/24  0058   SODIUM mmol/L 135*   < > 134*   POTASSIUM mmol/L 4.9   < > 4.0   CHLORIDE mmol/L 102   < > 99   CO2 mmol/L 27   < > 23   BUN mg/dL 13   < > 15   CREATININE mg/dL 0.70   < > 0.66   CALCIUM mg/dL 9.0   < > 9.9   PROTEIN TOTAL g/dL  --   --  7.2   BILIRUBIN TOTAL mg/dL  --   --  0.8   ALK PHOS U/L  --   --  45   ALT U/L  --   --  6*   AST U/L  --   --  15   GLUCOSE mg/dL 93   < > 107*    < > = values in this interval not displayed.     Results from last 7 days   Lab Units 08/27/24  1422   MAGNESIUM mg/dL 1.70             Cardiology Tests     Echo:  Transthoracic Echo (TTE) Complete 08/26/2024  PHYSICIAN INTERPRETATION:  Left Ventricle: Left ventricular ejection fraction is normal, by visual estimate at 60-65%. There are no regional left ventricular wall motion abnormalities. The left ventricular cavity size is normal. Spectral Doppler shows a normal pattern of left ventricular diastolic filling.  Left Atrium: The left atrium is normal in size.  Right Ventricle: The right ventricle is small in size. There is normal right ventricular global systolic function.  Right Atrium: The right atrium is small in size.  Aortic Valve: The aortic valve was not well visualized. There is no evidence of aortic valve regurgitation.  Mitral Valve: The mitral valve is normal  in structure. There is trace mitral valve regurgitation.  Tricuspid Valve: The tricuspid valve is structurally normal. No evidence of tricuspid regurgitation.  Pulmonic Valve: The pulmonic valve is not well visualized. There is no indication of pulmonic valve regurgitation.  Pericardium: There is a large pericardial effusion.  Aorta: The aortic root is normal.        CONCLUSIONS:   1. Left ventricular ejection fraction is normal, by visual estimate at 60-65%.   2. There is normal right ventricular global systolic function.   3. There is a large pericardial effusion.   4. Echocardiographic findings are consistent with cardiac tamponade.    Transthoracic echo (TTE) limited 05/06/2024  PHYSICIAN INTERPRETATION:  Left Ventricle: The left ventricular systolic function is normal, with an estimated ejection fraction of 60%. There are no regional wall motion abnormalities. The left ventricular cavity size is normal. Spectral Doppler shows a normal pattern of left ventricular diastolic filling.  Left Atrium: The left atrium is normal in size.  Right Ventricle: The right ventricle is normal in size. There is normal right ventricular global systolic function.  Right Atrium: The right atrium is normal in size.  Aortic Valve: The aortic valve appears structurally normal. There is no evidence of aortic valve regurgitation. The peak instantaneous gradient of the aortic valve is 6.3 mmHg. The mean gradient of the aortic valve is 3.6 mmHg.  Mitral Valve: The mitral valve is normal in structure. There is no evidence of mitral valve regurgitation.  Tricuspid Valve: The tricuspid valve is structurally normal. No evidence of tricuspid regurgitation.  Pulmonic Valve: The pulmonic valve is structurally normal. There is no indication of pulmonic valve regurgitation.  Pericardium: There is a small pericardial effusion anterior to the right ventricle.  Aorta: The aortic root is normal.        CONCLUSIONS:   1. Left ventricular systolic  function is normal with a 60% estimated ejection fraction.   2. Small pericardial effusion located anterior to the right ventricle.     Transthoracic Echo (TTE) Complete 05/02/2024  PHYSICIAN INTERPRETATION:  Left Ventricle: The left ventricular systolic function is normal, with an estimated ejection fraction of 55-60%. There are no regional wall motion abnormalities. The left ventricular cavity size is normal. Left ventricular diastolic filling was not assessed.  Left Atrium: The left atrium was not assessed.  Right Ventricle: The right ventricle was not assessed. Right ventricular systolic function not assessed.  Right Atrium: The right atrium was not assessed.  Aortic Valve: The aortic valve was not assessed. Aortic valve regurgitation was not assessed.  Mitral Valve: The mitral valve was not assessed. Mitral valve regurgitation was not assessed.  Tricuspid Valve: The tricuspid valve was not assessed. Tricuspid regurgitation was not assessed.  Pulmonic Valve: The pulmonic valve was not assessed. Pulmonic valve regurgitation was not assessed.  Pericardium: There is a large pericardial effusion. There is right ventricular diastolic collapse, is brief right atrial diastolic collapse and is inferior vena caval plethora. These findings are consistent with cardiac tamponade.  Aorta: The aortic root was not assessed.  Systemic Veins: The inferior vena cava appears mildly dilated.        CONCLUSIONS:   1. Left ventricular systolic function is normal with a 55-60% estimated ejection fraction.   2. Echocardiographic findings are consistent with cardiac tamponade.   3. There is a large pericardial effusion.     Transthoracic echo (TTE) limited 04/15/2024  PHYSICIAN INTERPRETATION:  Left Ventricle: The left ventricular systolic function is normal, with an estimated ejection fraction of 65%. There are no regional wall motion abnormalities. The left ventricular cavity size is normal. Left ventricular diastolic filling was not  assessed.  Left Atrium: The left atrium is normal in size.  Right Ventricle: The right ventricle is normal in size. There is normal right ventricular global systolic function.  Right Atrium: The right atrium is normal in size. Mild right atrial collapse.  Aortic Valve: The aortic valve appears structurally normal. There is no evidence of aortic valve regurgitation.  Mitral Valve: The mitral valve is normal in structure. There is no evidence of mitral valve regurgitation.  Tricuspid Valve: The tricuspid valve is structurally normal. There is trace tricuspid regurgitation. The Doppler estimated RVSP is within normal limits at 16.1 mmHg.  Pulmonic Valve: The pulmonic valve is not well visualized. The pulmonic valve regurgitation was not well visualized.  Pericardium: There is a large pericardial effusion.  Aorta: The aortic root is normal.        CONCLUSIONS:   1. Left ventricular systolic function is normal with a 65% estimated ejection fraction.   2. Mild right atrial collapse.   3. There is a large pericardial effusion.   4. RVSP within normal limits.   5. In comparisn to the previous study of 3/15/24, there has been an increase in size of the pericardial effusion.     Onco-Echo Complete (Strain And 3D) 03/15/2024  PHYSICIAN INTERPRETATION:  Left Ventricle: The left ventricular systolic function is hyperdynamic, with an estimated ejection fraction of 65-70%. There are no regional wall motion abnormalities. The left ventricular cavity size is normal. Spectral Doppler shows a normal pattern of left ventricular diastolic filling.  Left Atrium: The left atrium is normal in size.  Right Ventricle: The right ventricle is normal in size. There is normal right ventricular global systolic function.  Right Atrium: The right atrium is normal in size.  Aortic Valve: The aortic valve appears structurally normal. There is no evidence of aortic valve regurgitation. The peak instantaneous gradient of the aortic valve is 3.8 mmHg.  The mean gradient of the aortic valve is 2.2 mmHg.  Mitral Valve: The mitral valve is normal in structure. There is no evidence of mitral valve regurgitation.  Tricuspid Valve: The tricuspid valve is structurally normal. No evidence of tricuspid regurgitation.  Pulmonic Valve: The pulmonic valve is structurally normal. There is no indication of pulmonic valve regurgitation.  Pericardium: There is a moderately sized pericardial effusion. The effusion is circumferential.  Aorta: The aortic root is normal.  Systemic Veins: The inferior vena cava was not well visualized.     ONCO-CARDIOLOGY:  Vital Signs: The patients heart rate during the study was 75 beats per minute.  The patients blood pressure was 102/56 during the study.  Machine: This study was performed on the [employer] E-9.  Previous Study: The patient had a previous Onco-Cardiology echocardiogram dated  5/31/2023. The patient's previous study was performed on the [employer] E-9.        Onco-Cardiology Measurements:  Historical Measurements from Previous Study  2D EF (Biplane)                     63%  Global Longitudinal Strain (GLS) -22.3%     Current Measurements  2D EF (Biplane)                     64%  Global Longitudinal Strain (GLS) -20.6%     GLS Tracking Quality: Fair        CONCLUSIONS:   1. Left ventricular systolic function is hyperdynamic with a 65-70% estimated ejection fraction.   2. There is a moderate pericardial effusion.   3. The pericardial effusion is new in comparison to the study of 5/31/23.   4. Global longitudinal strain is normal at -20%.   5. No hemodynamic evidence of pericardial tamponade.         History of Present Illness: Ayesha Nova is a 54 y.o. female with a PMH significant for longstanding recurrent metastatic breast cancer (initial dx 2016 - ductal carcinoma [G3, ER +, HER2/della amplified] with metastatic lesions to bilateral lungs - has declined treatment), LLL Lung Nodule (S/p surgical resection in 2018), Malignant Pericardial Effusion (S/p  pericardial window 5/2/24) who presents to [facility] on 8/25/2024 with complaints of chest discomfort and tachycardia.     ED Course: Patient vitals were notable for hypotension in 85-95 systolic range and tachycardia. Labs were unremarkable and troponin was flat. EKG was notable for sinus tachycardia with low voltage. CT angio of the chest showed an increase in pericardial effusion. Loculated right pleural effusion was also present, as well as central cavitations in the right lung apex that were suggestive of either pneumonia or a metastatic lesion. No pulmonary emboli were noted. The patient was given a 500mL NS bolus, followed by another 1L bolus. She was also given 0.2mg dilaudid and Zofran for pain management and nausea.      Given the patients CT imaging revealing a Right pleural effusion and a pericardial effusion, cardiac surgery was consulted for evaluation & management.      Daily Events    8/26/24: No acute events ovenright; patient asymptomatic at rest. Plan for R thoracentesis today. TTE observed this AM showing a moderately large anterior pericardial effusion. Plan for OR tomorrow afternoon for a pericardial window creation.      8/27/24: Seen s/p pericardial window with Dr. Giles. Uneventful procedure. On facemask, in bed. Continue multimodal pain management per primary team.     Assessment & Plan      Malignant Pericardial Effusion   - Secondary to underlying metastatic breast cancer   - S/p Urgent Pericardial Window on 5/2/24 with Cardiac Surgeon, Dr. Saad Giles   - Patient presented to the ED with tachycardia and dyspnea on 8/25  --> CT chest showing fluid around the pericardium   - TTE on 8/26 showing recurrent of pericardial fluid with a moderate sized effusion   - Now s/p redo pericardial window on 8/27 with Dr. Giles.  --> maintain mediastinal chest tube to -20 cm wall suction        Malignant Pleural Effusion   - Right loculated effusion appreciated on  8/25 CT chest  - Current O2 Requirements: Facemask s/p pericardial window  - S/p IR guided thoracentesis today on 8/26/24  --> Cytology pending  - Continue to follow CXrs     Above patient and plan discussed with cardiac surgeon, Dr. Giles. Plan as above. Will continue to follow along.     Amaris Jordan, LEANDER-CNP

## 2024-08-27 NOTE — OP NOTE
CREATION PERICARDIAL WINDOW (SUBXYPHOID APPROACH) Operative Note     Date: 2024  OR Location: [facility] OR    Name: Ayesha Nova, : 1970, Age: 54 y.o., MRN: [id number], Sex: female    Diagnosis  Pre-op Diagnosis      * Pericardial effusion (HHS-HCC) [I31.39]     * Pericardial effusion with cardiac tamponade (HHS-HCC) [I31.39, I31.4]     * Malignant pericardial effusion in diseases classified elsewhere (Multi) [I31.31] Post-op Diagnosis     * Pericardial effusion (HHS-HCC) [I31.39]     * Pericardial effusion with cardiac tamponade (HHS-HCC) [I31.39, I31.4]     * Malignant pericardial effusion in diseases classified elsewhere (Multi) [I31.31]     Procedures  CREATION PERICARDIAL WINDOW (SUBXYPHOID APPROACH)  09132 - DE CRTJ PERICARDIAL WINDOW/PRTL RESECJ W/DRG/BX  1. Creation of pericardial window with partial resection for drainage, subxiphoid approach. CPT: (05104)    Surgeons      * eBto Giles - Primary    Resident/Fellow/Other Assistant:  LARISSA Joseph    Procedure Summary  Anesthesia: General  ASA: IV  Anesthesia Staff: Anesthesiologist: Adonay Mcgregor MD  C-AA: EDILMA Mathis  Perfusionist: Adri Thomson  Estimated Blood Loss: Minimal mL  Intra-op Medications: * Intraprocedure medication information is unavailable because the case start and end events have not been set *           Anesthesia Record               Intraprocedure I/O Totals          Intake    NaCl 0.9 % bolus 500.00 mL    lactated Ringer's infusion 800.00 mL    Total Intake 1300 mL       Output    Urine 125 mL    Total Output 125 mL       Net    Net Volume 1175 mL          Specimen:   ID Type Source Tests Collected by Time   1 : PERICARDIAL FLUID Non-Gynecologic Cytology PERICARDIAL FINE NEEDLE ASPIRATION CYTOLOGY CONSULTATION (NON-GYNECOLOGIC) Beto Giles MD 2024 1418   2 : PERICARDIUM Tissue HEART PERICARDIUM BIOPSY SURGICAL PATHOLOGY EXAM Beto Giles MD 2024 1418        Staff:   Circulator:  Nita  Scrub Person: Dana Yanes Scrub: Holly Yanes Circulator: Gabi         Drains and/or Catheters:   Chest Tube 1 Mediastinal 24 Fr (Active)   Function -20 cm H2O 08/27/24 1500   Chest Tube Air Leak No 08/27/24 1500   Drainage Description Serosanguineous 08/27/24 1500   Dressing Status Other (Comment) 08/27/24 1500   Site Assessment Clean 08/27/24 1500   Surrounding Skin Dry;Intact 08/27/24 1500       [REMOVED] Urethral Catheter Non-latex 16 Fr. (Removed)           Findings: 270 ml of straw colored fluid was removed from the pericardial sac.  There were some adhesions to the RV as well as to the lateral wall of the LV.  I resected a 2 cm oval of pericardium and sent for cytology.  Fluid was also sent for cytology.     Indications: Ayesha Nova is an 54 y.o. female who is having surgery for Pericardial effusion (HHS-HCC) [I31.39]  Pericardial effusion with cardiac tamponade (HHS-HCC) [I31.39, I31.4]  Malignant pericardial effusion in diseases classified elsewhere (Multi) [I31.31]. deja Nova is a 54 y.o. female with a PMH significant for longstanding recurrent metastatic breast cancer (initial dx 2016 - ductal carcinoma [G3, ER +, HER2/della amplified] with metastatic lesions to bilateral lungs - has declined treatment), LLL Lung Nodule (S/p surgical resection in 2018), Malignant Pericardial Effusion (S/p pericardial window 5/2/24) who presents to [facility] on 8/25/2024 with complaints of chest discomfort and tachycardia.     The patient was seen in the preoperative area. The risks, benefits, complications, treatment options, non-operative alternatives, expected recovery and outcomes were discussed with the patient. The possibilities of reaction to medication, pulmonary aspiration, injury to surrounding structures, bleeding, recurrent infection, the need for additional procedures, failure to diagnose a condition, and creating a complication requiring transfusion or operation  were discussed with the patient. The patient concurred with the proposed plan, giving informed consent.  The site of surgery was properly noted/marked if necessary per policy. The patient has been actively warmed in preoperative area. Preoperative antibiotics have been ordered and given within 1 hours of incision. Venous thrombosis prophylaxis have been ordered including chemical prophylaxis    Procedure Details: After informed consent was obtained, and all questions asked and answered to the patients satisfaction, and after positive identification, they were brought to the operative theater.  A upper extremity arterial line was placed. They then underwent induction of general anesthesia.  The chin to knees were prepped and draped in the standard surgical fashion. A #10-blade scalpel was used to make an 3 cm incision in the area of the xiphoid process. Dissection was carried down to the level of the fascia using Bovie electrocautery. The xiphoid process was elevated, and the diaphragmatic attachments to it were dissected free. The base of the xiphoid was freed from surrounding tissue and removed.  Next the pericardium was identified.    The pericardium was opened with Bovie electrocautery. Upon entering the pericardium, straw colored ascitic fluid was evacuated. In total, 270 ml of fluid was drained. Inspection of the pericardium showed no more evidence of bleeding. A 2 cm ellipse was removed. A 24 Fr Jf chest drain was brought out through the a counter incision and placed in the posterior pericardium; subsequently attached to an pleur-evac. The fascia was closed by a #0 polysorb in a running fashion, followed by 2-0 polysorb for the subcutaneous layer, followed by 4-0 monocryl in a running subcuticular fashion. Sterile dressing was applied.    The needle, instrument, and sponge count were correct x 2.  The patient tolerated the procedure well. They were brought to the CTICU in stable condition.      Complications:  None; patient tolerated the procedure well.    Disposition: ICU - intubated and hemodynamically stable.  Condition: stable         Beto Giles  Phone Number: [phone]

## 2024-08-27 NOTE — PROGRESS NOTES
Internal Medicine Progress Note         Ayesha Nova is a 54 y.o. female on day 2 of admission presenting with Chest pain, unspecified type.    SUBJECTIVE  Patient appears similarly to yesterday. She says she is slightly more energetic and that toradol helps her pain for the most part. She is uncertain if the thoracentesis helped her breathing. She feels comfortable regarding her upcoming pericardial window.    OBJECTIVE    Vitals:    08/27/24 0600 08/27/24 0615 08/27/24 0800 08/27/24 1000   BP: 86/54 97/59     BP Location:       Patient Position:       Pulse: 104 104     Resp: 21 26  (!) 28   Temp:       TempSrc:       SpO2: 95% 96% 95%    Weight:       Height:          Results from last 7 days   Lab Units 08/27/24  0528 08/25/24  0601 08/25/24  0058   WBC AUTO x10*3/uL 8.0   < > 11.0   HEMOGLOBIN g/dL 10.6*   < > 12.0   HEMATOCRIT % 33.5*   < > 36.9   PLATELETS AUTO x10*3/uL 362   < > 397   NEUTROS PCT AUTO %  --   --  76.8   LYMPHS PCT AUTO %  --   --  15.1   MONOS PCT AUTO %  --   --  6.8   EOS PCT AUTO %  --   --  0.5    < > = values in this interval not displayed.     Results from last 7 days   Lab Units 08/27/24 0528 08/25/24  0601 08/25/24  0058   SODIUM mmol/L 135*   < > 134*   POTASSIUM mmol/L 4.9   < > 4.0   CHLORIDE mmol/L 102   < > 99   CO2 mmol/L 27   < > 23   BUN mg/dL 13   < > 15   CREATININE mg/dL 0.70   < > 0.66   CALCIUM mg/dL 9.0   < > 9.9   PROTEIN TOTAL g/dL  --   --  7.2   BILIRUBIN TOTAL mg/dL  --   --  0.8   ALK PHOS U/L  --   --  45   ALT U/L  --   --  6*   AST U/L  --   --  15   GLUCOSE mg/dL 93   < > 107*    < > = values in this interval not displayed.       Scheduled Medications  cefTRIAXone, 1 g, intravenous, q24h  [Held by provider] heparin (porcine), 5,000 Units, subcutaneous, q8h  perflutren lipid microspheres, 0.5-10 mL of dilution, intravenous, Once in imaging  perflutren protein A microsphere, 0.5 mL, intravenous, Once  in imaging  polyethylene glycol, 17 g, oral, Daily  sulfur hexafluoride microsphr, 2 mL, intravenous, Once in imaging           Physical Exam  General: alert and oriented. No acute distress.  HEENT: oral mucosa moist, EOMI. No JVD.   CHEST: right sided wheezing on auscultation  CVS: regular rate and rhythm, no murmurs.   ABD: Soft, Non Tender, BS present.  EXT: no edema.  SKIN: no rash.  NEURO: Nonfocal grossly.                 ASSESSMENT & PLAN  Ayesha Nova is a 54 y.o. female with a history of metastatic breast cancer, cardiac tamponade s/p pericardial window, and hypothyroidism who presented to the ED on 8/25 for chest pain that intensified without improvement from NSAIDs a few hours prior to her arrival.     #Chest pain  #Recurrent pericardial effusion likely 2/2 metastatic right breast cancer s/p lumpectomy in 2016  - Patient had pericardial window performed 05/2024 after presenting for pericardial effusion. Imaging showed a recurrence of pericardial effusion. Cardiology and cardiothoracic surgery were consulted regarding this case. Most recent labs are unremarkable with the exception of Hgb which is 10.6. She is responding partly to toradol. TTE performed yesterday was notable for a large pericardial effusion consistent with cardiac tamponade.  PLAN:  - Patient will undergo pericardial window procedure today, follow up with cardiology and cardiothoracic recommendations post-procedure.  - Diet advanced to adult regular, LR infusion stopped.  - Heparin discontinued in setting of procedure.    #Right lung opacity, suspected pneumonia vs. metastatic lesion  #Right lobar pulmonary artery encasement 2/2 R upper lobe and hilar mass  - CT imaging was notable for an ambiguous opacity that could represent pneumonia or a metastatic lesion. Her most recent WBC count is 8.0, decreased from 12.8. She still has a persistent cough that impedes her ability to speak.  PLAN:  - Continue rocephin 1g and azithromycin 500mg daily.  Monitor CBC.  - Continue duoneb, Tessalon, and robitussin PRN.  - Follow up outpatient with heme/onc.    #Worsening loculated moderate right pleural effusion   - Patient was found to have a large pleural effusion on imaging that is recurrent, possibly in the setting of her malignancy. Thoracentesis yesterday yielded 1L of sudha colored fluid. Fluid studies showed WBC 7000, RBC 67170, and protein 4.6. At least one Light's criteria has been met, suggesting the fluid is exudative in nature. This is consistent with the suspected malignant etiology of the effusion.  PLAN:  - Per cardiology, the patient may need evaluation by the thoracic surgery team for placement of a pleural catheter.  - Patient is saturating well on room air, continue to monitor oxygen requirements and SpO2.    #Hypotension  - Patient arrived to ED hypotensive and tachycardic. She received 1.5L of NS, and pressures remained soft. BP on examination is 101/59.  PLAN:  - Patient was given a 500mL bolus of LR yesterday and is currently receiving 100mL/hr of LR. Continue to infuse, monitor for fluid overload.    Chronic Conditions  #Hypothyroidism  PLAN:  - Patient has hx of hypothyroidism but denies taking any medications for it. TSH is normal.    DVT ppx: -  GI ppx: -  IVF: None  Diet: Adult diet regular  Consults: Cardiology, cardiothoracic surgery, pulmonology  CODE STATUS: Full code    Mega Vergara MD   Please SecureChat for any further questions  This is a preliminary note, please await attending attestation for final A/P

## 2024-08-27 NOTE — PROGRESS NOTES
Subjective Data:  Remains in sinus rhythm.  Slightly tachycardic.  No chest pain.  Breathing better today.       Objective Data:  Last Recorded Vitals:  Vitals:    08/27/24 0500 08/27/24 0600 08/27/24 0615 08/27/24 0800   BP: 87/50 86/54 97/59    BP Location:       Patient Position:       Pulse: 101 104 104    Resp: 20 21 26    Temp:       TempSrc:       SpO2: 95% 95% 96% 95%   Weight:       Height:           Last Labs:  CBC - 8/27/2024:  5:28 AM  8.0 10.6 362    33.5      CMP - 8/27/2024:  5:28 AM  9.0 7.2 15 --- 0.8   2.9 3.3 6 45      PTT - 8/27/2024:  5:28 AM  1.3   14.1 32     TROPHS   Date/Time Value Ref Range Status   08/25/2024 01:58 AM 4 0 - 13 ng/L Final   08/25/2024 12:58 AM 4 0 - 13 ng/L Final   06/03/2024 03:44 PM <3 0 - 13 ng/L Final     BNP   Date/Time Value Ref Range Status   05/01/2024 07:24 PM 21 0 - 99 pg/mL Final   03/07/2024 01:03 PM 35 0 - 99 pg/mL Final     VLDL   Date/Time Value Ref Range Status   08/23/2019 08:50 AM 13 0 - 40 mg/dL Final      Last I/O:  I/O last 3 completed shifts:  In: 2031.7 (33.5 mL/kg) [I.V.:1431.7 (23.6 mL/kg); IV Piggyback:600]  Out: 1000 (16.5 mL/kg) [Drains:1000]  Weight: 60.6 kg     Past Cardiology Tests (Last 3 Years):  EKG:  ECG 12 lead 06/03/2024      Electrocardiogram, 12-lead PRN ACS symptoms 05/03/2024      ECG 12 Lead 05/02/2024      Electrocardiogram, 12-lead PRN ACS symptoms 05/01/2024      ECG 12 lead 05/01/2024      ECG 12 lead 03/07/2024    Echo:  Transthoracic Echo (TTE) Complete 08/26/2024      Transthoracic echo (TTE) limited 05/06/2024      Transesophageal Echo (DIAN)       Transthoracic Echo (TTE) Complete 05/02/2024      Transthoracic echo (TTE) limited 04/15/2024      Onco-Echo Complete (Strain And 3D) 03/15/2024    Ejection Fractions:  EF   Date/Time Value Ref Range Status   08/26/2024 09:03 AM 63 %    03/15/2024 12:43 PM 50 %      Cath:  No results found for this or any previous visit from the past 1095 days.    Stress Test:  No results found for  this or any previous visit from the past 1095 days.    Cardiac Imaging:  No results found for this or any previous visit from the past 1095 days.      Inpatient Medications:  Scheduled medications   Medication Dose Route Frequency    azithromycin  500 mg intravenous q24h    cefTRIAXone  1 g intravenous q24h    [Held by provider] heparin (porcine)  5,000 Units subcutaneous q8h    perflutren lipid microspheres  0.5-10 mL of dilution intravenous Once in imaging    perflutren protein A microsphere  0.5 mL intravenous Once in imaging    polyethylene glycol  17 g oral Daily    sulfur hexafluoride microsphr  2 mL intravenous Once in imaging     PRN medications   Medication    acetaminophen    Or    acetaminophen    Or    acetaminophen    benzonatate    guaiFENesin    HYDROmorphone    ibuprofen    ipratropium-albuteroL    ketorolac    melatonin    ondansetron     Continuous Medications   Medication Dose Last Rate    lactated Ringer's  100 mL/hr 100 mL/hr (08/27/24 0817)       Physical Exam:  Physical Exam:  Constitutional:       General: Mild respiratory distress.  Sitting up     HENT:      Head: Normocephalic and atraumatic.      Nose: Nose normal.      Mouth/Throat:      Mouth: Mucous membranes are moist.      Pharynx: Oropharynx is clear.   Eyes:      Extraocular Movements: Extraocular movements intact.      Conjunctiva/sclera: Conjunctivae normal.      Pupils: Pupils are equal, round, and reactive to light.   Cardiovascular:   Tachycardic, regular  Slightly muffled heart sounds     Pulmonary:   Breath sounds have improved significantly     Abdominal:      General: Abdomen is flat. Bowel sounds are normal. There is no distension.      Palpations: Abdomen is soft.   Musculoskeletal:         General: Normal range of motion.      Cervical back: Normal range of motion and neck supple.      Right lower leg: No edema.      Left lower leg: No edema.   Skin:     General: Skin is warm and dry.      Capillary Refill: Capillary  refill takes less than 2 seconds.      Findings: No lesion.      Nails: There is no clubbing.   Neurological:      General: No focal deficit present.      Mental Status: She is alert and oriented to person, place, and time. Mental status is at baseline.      Cranial Nerves: Cranial nerves 2-12 are intact.      Sensory: Sensation is intact.      Motor: Motor function is intact.   Psychiatric:         Attention and Perception: Attention and perception normal.         Mood and Affect: Mood normal.         Speech: Speech normal.         Behavior: Behavior normal.         Thought Content: Thought content normal.         Cognition and Memory: Cognition and memory normal.         Judgment: Judgment normal.            Assessment/Plan  Malignant Pericardial Effusion   - Secondary to underlying metastatic breast cancer   - S/p Urgent Pericardial Window on 5/2/24 with Cardiac Surgeon, Dr. Saad Giles   - Patient presented to the ED with tachycardia and dyspnea on 8/25  --> CT chest showing fluid around the pericardium   - Patient is with tachycardia (HR 100s) however no hypotension         Malignant Pleural Effusion   - Right loculated effusion appreciated on 8/25 CT chest  - Current O2 Requirements: RA  - Plan for Inpatient thoracentesis           Patient has recurrent pericardial effusion which appears to be large.  She also has significant right-sided pleural effusion and has significant shortness of breath now causing her to sit up.  She is planned to get thoracentesis later this morning.  I discussed the case with CT surgery and I believe it would be reasonable to proceed with redo pericardial window as without other functioning window, despite pericardiocentesis the fluid reaccumulation and pericardial will be back very likely as it appears to have malignant origin.  Also she may need evaluation by thoracic surgery team for consideration of Pleurx catheter      will continue to monitor her closely in heart center for  time being.     8/27/2024  Hemodynamics are relatively stable.  1 L of fluid was removed from right lung yesterday with thoracentesis.  Will likely require pleuredex.  Going for pericardial window today by CT surgery team.        Sin Morillo MD, PhD, FACC, [facility]  Interventional Cardiology, [facility]  Associate Professor of Medicine, [facility]  Office: [phone]

## 2024-08-27 NOTE — DOCUMENTATION CLARIFICATION NOTE
"    PATIENT:               MINNIE OLIVER  ACCT #:                  [id number]  MRN:                       [id number]  :                       1970  ADMIT DATE:       2024 12:01 AM  DISCH DATE:  RESPONDING PROVIDER #:        [id number]          PROVIDER RESPONSE TEXT:    Pneumonia ruled in this admission.    CDI QUERY TEXT:    Clarification        Instruction:    Based on your assessment of the patient and the clinical information, please provide the requested documentation by clicking on the appropriate radio button and enter any additional information if prompted.    Question: Please further specify the type of pneumonia being treated    When answering this query, please exercise your independent professional judgment. The fact that a question is being asked, does not imply that any particular answer is desired or expected.    The patient's clinical indicators include:  Clinical Information: 24 H/P Dr. Vergara \" 54 y.o. female with a history of metastatic breast cancer, cardiac tamponade s/p pericardial window, and hypothyroidism who presented to the ED on  for chest pain. \" Exam: Chest:  Crackling over right upper lung field. Diagnosis: Recurrent pericardial effusion likely 2/2 metastatic breast cancer. Right lung opacity, suspected pneumonia vs. metastatic lesion. Worsening loculated moderate right pleural effusion.    Clinical Indicators:  1. WBC: 24 11.0 and 11.9, 24 12.8, 24 8.0.  2.  24 XR Chest \"Development or progression of a moderate-to-large right effusion.\"  3. Vital signs: 24 Temperature 36.6, , RR 20, B/P 98/57.  24 Temperature 36.5, , RR 35, B/P 94/56.  24 temperature 36.5, , RR 25, B/P 107/49.  4. H/P \"Her only change in medication recently was a 14 day course of doxycycline for suspected pneumonia detected on CXR.\"  . 24 Pulmonary \"Right lung opacity, suspected pneumonia vs. metastatic lesion.\"  . 24 Cardiology \"Patient has " "recurrent pericardial effusion which appears to be large.  She also has significant right-sided pleural effusion and has significant shortness of breath now causing her to sit up.\"      Treatment: Rocephin IVPB x 3 days. Azithromycin IVPB x 1 day. 8/26/24  Right Thoracentesis    Risk Factors: Previous treatment for suspected pneumonia, XR Chest \"Development or progression of a moderate-to-large right effusion. Treatment with Rocephin and Azithromycin IVPB. RR 20 to 35. Cardiology \"Patient has recurrent pericardial effusion which appears to be large.  She also has significant right-sided pleural effusion and has significant shortness of breath now causing her to sit up.\"  Options provided:  -- Pneumonia ruled in this admission.  -- Pneumonia ruled out this admission.  -- Other - I will add my own diagnosis  -- Refer to Clinical Documentation Reviewer    Query created by: Kobi Archer on 8/27/2024 10:25 AM      Electronically signed by:  IAN SCHNEIDER MD 8/27/2024 11:04 AM          "

## 2024-08-28 ENCOUNTER — APPOINTMENT (OUTPATIENT)
Dept: RADIOLOGY | Facility: HOSPITAL | Age: 54
DRG: 163 | End: 2024-08-28
Payer: COMMERCIAL

## 2024-08-28 ENCOUNTER — APPOINTMENT (OUTPATIENT)
Dept: CARDIOLOGY | Facility: HOSPITAL | Age: 54
End: 2024-08-28
Payer: COMMERCIAL

## 2024-08-28 LAB
ACID FAST STN SPEC: NORMAL
ANION GAP BLDA CALCULATED.4IONS-SCNC: 9 MMO/L (ref 10–25)
ANION GAP SERPL CALC-SCNC: 12 MMOL/L (ref 10–20)
BASE EXCESS BLDA CALC-SCNC: 0 MMOL/L (ref -2–3)
BODY TEMPERATURE: 37 DEGREES CELSIUS
BUN SERPL-MCNC: 15 MG/DL (ref 6–23)
CA-I BLDA-SCNC: 1.21 MMOL/L (ref 1.1–1.33)
CALCIUM SERPL-MCNC: 8.8 MG/DL (ref 8.6–10.3)
CHLORIDE BLDA-SCNC: 104 MMOL/L (ref 98–107)
CHLORIDE SERPL-SCNC: 103 MMOL/L (ref 98–107)
CO2 SERPL-SCNC: 24 MMOL/L (ref 21–32)
COHGB MFR BLDA: 1.2 %
CREAT SERPL-MCNC: 0.5 MG/DL (ref 0.5–1.05)
DO-HGB MFR BLDA: 0.6 % (ref 0–5)
EGFRCR SERPLBLD CKD-EPI 2021: >90 ML/MIN/1.73M*2
EJECTION FRACTION: 58 %
ERYTHROCYTE [DISTWIDTH] IN BLOOD BY AUTOMATED COUNT: 13.9 % (ref 11.5–14.5)
GLUCOSE BLDA-MCNC: 88 MG/DL (ref 74–99)
GLUCOSE SERPL-MCNC: 113 MG/DL (ref 74–99)
HCO3 BLDA-SCNC: 23.5 MMOL/L (ref 22–26)
HCT VFR BLD AUTO: 33.5 % (ref 36–46)
HCT VFR BLD EST: 31 % (ref 36–46)
HGB BLD-MCNC: 11 G/DL (ref 12–16)
HGB BLDA-MCNC: 10.3 G/DL (ref 12–16)
HGB BLDA-MCNC: 10.3 G/DL (ref 12–16)
INHALED O2 CONCENTRATION: 92 %
LACTATE BLDA-SCNC: 1.1 MMOL/L (ref 0.4–2)
MAGNESIUM SERPL-MCNC: 1.91 MG/DL (ref 1.6–2.4)
MCH RBC QN AUTO: 27.9 PG (ref 26–34)
MCHC RBC AUTO-ENTMCNC: 32.8 G/DL (ref 32–36)
MCV RBC AUTO: 85 FL (ref 80–100)
METHGB MFR BLDA: 0.8 % (ref 0–1.5)
MYCOBACTERIUM SPEC CULT: NORMAL
NRBC BLD-RTO: 0 /100 WBCS (ref 0–0)
OXYHGB MFR BLDA: 97.4 % (ref 94–98)
OXYHGB MFR BLDA: 97.4 % (ref 94–98)
PCO2 BLDA: 33 MM HG (ref 38–42)
PH BLDA: 7.46 PH (ref 7.38–7.42)
PLATELET # BLD AUTO: 387 X10*3/UL (ref 150–450)
PO2 BLDA: 454 MM HG (ref 85–95)
POTASSIUM BLDA-SCNC: 4.6 MMOL/L (ref 3.5–5.3)
POTASSIUM SERPL-SCNC: 4.6 MMOL/L (ref 3.5–5.3)
RBC # BLD AUTO: 3.94 X10*6/UL (ref 4–5.2)
SAO2 % BLDA: 99 % (ref 94–100)
SODIUM BLDA-SCNC: 132 MMOL/L (ref 136–145)
SODIUM SERPL-SCNC: 134 MMOL/L (ref 136–145)
WBC # BLD AUTO: 6.1 X10*3/UL (ref 4.4–11.3)

## 2024-08-28 PROCEDURE — 37799 UNLISTED PX VASCULAR SURGERY: CPT

## 2024-08-28 PROCEDURE — 99233 SBSQ HOSP IP/OBS HIGH 50: CPT | Performed by: NURSE PRACTITIONER

## 2024-08-28 PROCEDURE — 85027 COMPLETE CBC AUTOMATED: CPT

## 2024-08-28 PROCEDURE — 83735 ASSAY OF MAGNESIUM: CPT

## 2024-08-28 PROCEDURE — 2500000004 HC RX 250 GENERAL PHARMACY W/ HCPCS (ALT 636 FOR OP/ED)

## 2024-08-28 PROCEDURE — 93308 TTE F-UP OR LMTD: CPT | Performed by: STUDENT IN AN ORGANIZED HEALTH CARE EDUCATION/TRAINING PROGRAM

## 2024-08-28 PROCEDURE — P9045 ALBUMIN (HUMAN), 5%, 250 ML: HCPCS | Mod: JZ | Performed by: NURSE PRACTITIONER

## 2024-08-28 PROCEDURE — 2500000004 HC RX 250 GENERAL PHARMACY W/ HCPCS (ALT 636 FOR OP/ED): Mod: JZ | Performed by: NURSE PRACTITIONER

## 2024-08-28 PROCEDURE — 99291 CRITICAL CARE FIRST HOUR: CPT | Performed by: STUDENT IN AN ORGANIZED HEALTH CARE EDUCATION/TRAINING PROGRAM

## 2024-08-28 PROCEDURE — 80048 BASIC METABOLIC PNL TOTAL CA: CPT

## 2024-08-28 PROCEDURE — 93308 TTE F-UP OR LMTD: CPT

## 2024-08-28 PROCEDURE — 71045 X-RAY EXAM CHEST 1 VIEW: CPT

## 2024-08-28 PROCEDURE — 2020000001 HC ICU ROOM DAILY

## 2024-08-28 PROCEDURE — 71045 X-RAY EXAM CHEST 1 VIEW: CPT | Performed by: STUDENT IN AN ORGANIZED HEALTH CARE EDUCATION/TRAINING PROGRAM

## 2024-08-28 RX ORDER — ALBUMIN HUMAN 50 G/1000ML
12.5 SOLUTION INTRAVENOUS ONCE
Status: COMPLETED | OUTPATIENT
Start: 2024-08-28 | End: 2024-08-28

## 2024-08-28 RX ORDER — ALBUMIN HUMAN 50 G/1000ML
SOLUTION INTRAVENOUS
Status: DISPENSED
Start: 2024-08-28 | End: 2024-08-28

## 2024-08-28 RX ADMIN — CEFTRIAXONE SODIUM 1 G: 1 INJECTION, SOLUTION INTRAVENOUS at 04:39

## 2024-08-28 RX ADMIN — POLYETHYLENE GLYCOL 3350 17 G: 17 POWDER, FOR SOLUTION ORAL at 16:00

## 2024-08-28 RX ADMIN — KETOROLAC TROMETHAMINE 15 MG: 30 INJECTION, SOLUTION INTRAMUSCULAR at 05:37

## 2024-08-28 RX ADMIN — ALBUMIN HUMAN 12.5 G: 0.05 INJECTION, SOLUTION INTRAVENOUS at 10:23

## 2024-08-28 ASSESSMENT — PAIN SCALES - GENERAL
PAINLEVEL_OUTOF10: 2
PAINLEVEL_OUTOF10: 0 - NO PAIN
PAINLEVEL_OUTOF10: 8
PAINLEVEL_OUTOF10: 0 - NO PAIN
PAINLEVEL_OUTOF10: 0 - NO PAIN
PAINLEVEL_OUTOF10: 3
PAINLEVEL_OUTOF10: 0 - NO PAIN
PAINLEVEL_OUTOF10: 0 - NO PAIN

## 2024-08-28 ASSESSMENT — PAIN - FUNCTIONAL ASSESSMENT
PAIN_FUNCTIONAL_ASSESSMENT: 0-10

## 2024-08-28 ASSESSMENT — COGNITIVE AND FUNCTIONAL STATUS - GENERAL
WALKING IN HOSPITAL ROOM: A LITTLE
MOBILITY SCORE: 22
DRESSING REGULAR LOWER BODY CLOTHING: A LITTLE
HELP NEEDED FOR BATHING: A LITTLE
DAILY ACTIVITIY SCORE: 22
CLIMB 3 TO 5 STEPS WITH RAILING: A LITTLE

## 2024-08-28 ASSESSMENT — ENCOUNTER SYMPTOMS
MYALGIAS: 0
IRREGULAR HEARTBEAT: 0
BLURRED VISION: 0
COUGH: 1
VOMITING: 0
ORTHOPNEA: 0
DOUBLE VISION: 0
ALTERED MENTAL STATUS: 0
NAUSEA: 0
FOCAL WEAKNESS: 0
MUSCLE CRAMPS: 0
DIZZINESS: 0
PALPITATIONS: 0
STIFFNESS: 0
SHORTNESS OF BREATH: 0
DECREASED APPETITE: 0
NAIL CHANGES: 0
FREQUENCY: 0
DYSPNEA ON EXERTION: 0
WEAKNESS: 1
LIGHT-HEADEDNESS: 0
POOR WOUND HEALING: 0
BLOATING: 0

## 2024-08-28 ASSESSMENT — PAIN DESCRIPTION - LOCATION: LOCATION: CHEST

## 2024-08-28 NOTE — CARE PLAN
"The patient's goals for the shift include  decrease pain    The clinical goals for the shift include pt to maintain SpO2 >90% with decreasing oxygen needs and ambulate x2 during shift    Over the shift, the patient did not make progress toward the following goals. Barriers to progression include recent surgery and \"uncomfortable bed\". Recommendations to address these barriers include pharmacologic and nonpharmacologic pain interventions as needed & ordered.      Problem: ACS/CP/NSTEMI/STEMI  Goal: Chest pain managed (free from pain or at acceptable level)  Outcome: Progressing  Goal: Lab values return to normal range  Outcome: Progressing  Goal: Promote self management  Outcome: Progressing  Goal: Serial ECG will return to baseline  Outcome: Progressing  Goal: Verbalize understanding of procedures/devices  Outcome: Progressing  Goal: Wean vasopressors/achieve hemodynamic stability  Outcome: Progressing     Problem: Arrythmia/Dysrhythmia  Goal: Lab values return to normal range  Outcome: Progressing  Goal: No evidence of post procedure complications  Outcome: Progressing  Goal: Promote self management  Outcome: Progressing  Goal: Serial ECG will return to baseline  Outcome: Progressing  Goal: Verbalize understanding of procedures/devices  Outcome: Progressing  Goal: Vital signs return to baseline  Outcome: Progressing     Problem: Cardiac catheterization  Goal: Free from dysrhythmias  Outcome: Progressing  Goal: Free from pain  Outcome: Progressing  Goal: No evidence of post procedure complications  Outcome: Progressing  Goal: Promote self management  Outcome: Progressing  Goal: Verbalize understanding of procedure  Outcome: Progressing     Problem: Respiratory  Goal: Clear secretions with interventions this shift  Outcome: Progressing  Goal: Minimize anxiety/maximize coping throughout shift  Outcome: Progressing  Goal: Minimal/no exertional discomfort or dyspnea this shift  Outcome: Progressing  Goal: No signs of " respiratory distress (eg. Use of accessory muscles. Peds grunting)  Outcome: Progressing  Goal: Patent airway maintained this shift  Outcome: Progressing  Goal: Tolerate mechanical ventilation evidenced by VS/agitation level this shift  Outcome: Progressing  Goal: Tolerate pulmonary toileting this shift  Outcome: Progressing  Goal: Verbalize decreased shortness of breath this shift  Outcome: Progressing  Goal: Wean oxygen to maintain O2 saturation per order/standard this shift  Outcome: Progressing  Goal: Increase self care and/or family involvement in next 24 hours  Outcome: Progressing

## 2024-08-28 NOTE — PROGRESS NOTES
Met with patient at bedside.  Patient had pericardial window yesterday.  Patient states she is feeling better.  Discharge plan remains home, no skilled needs.  Care Transitions will continue to follow.

## 2024-08-28 NOTE — PROGRESS NOTES
Cardiac Surgery  Progress Note     Ayesha Nova is a 54 y.o. female on day 3 of admission presenting with Chest pain, unspecified type.    Subjective     Interval HPI: Seen and assessed patient this AM while they were laying in bed. In no acute distress and with minimal complaints.     Review of Systems   Constitutional: Negative for decreased appetite and malaise/fatigue.   HENT:  Negative for congestion.    Eyes:  Negative for blurred vision and double vision.   Cardiovascular:  Negative for chest pain, dyspnea on exertion, irregular heartbeat, leg swelling, orthopnea and palpitations.   Respiratory:  Positive for cough. Negative for shortness of breath.    Endocrine: Negative for cold intolerance and heat intolerance.   Skin:  Negative for nail changes, poor wound healing and rash.   Musculoskeletal:  Negative for arthritis, muscle cramps, muscle weakness, myalgias and stiffness.   Gastrointestinal:  Negative for bloating, nausea and vomiting.   Genitourinary:  Negative for frequency, hesitancy and urgency.   Neurological:  Positive for weakness. Negative for dizziness, focal weakness and light-headedness.   Psychiatric/Behavioral:  Negative for altered mental status.    Allergic/Immunologic: Negative for environmental allergies.         Objective   Physical Exam  Physical Exam  Vitals and nursing note reviewed.   Constitutional:       General: She is not in acute distress.     Appearance: Normal appearance. She is not ill-appearing.   HENT:      Head: Normocephalic and atraumatic.      Nose: Nose normal.      Mouth/Throat:      Mouth: Mucous membranes are moist.      Pharynx: Oropharynx is clear.   Eyes:      Extraocular Movements: Extraocular movements intact.      Conjunctiva/sclera: Conjunctivae normal.      Pupils: Pupils are equal, round, and reactive to light.   Neck:      Vascular: Carotid bruit present.   Cardiovascular:      Rate and Rhythm: Normal rate and regular rhythm.      Pulses: Normal pulses.       Heart sounds: Normal heart sounds, S1 normal and S2 normal. No murmur heard.     No systolic murmur is present.      No friction rub. No gallop.   Pulmonary:      Effort: Pulmonary effort is normal. No tachypnea, bradypnea or accessory muscle usage.      Breath sounds: Examination of the right-lower field reveals decreased breath sounds. Examination of the left-lower field reveals decreased breath sounds. Decreased breath sounds present.   Chest:      Chest wall: No mass.      Comments: Mediastinal Chest Tube   Abdominal:      General: Abdomen is flat. Bowel sounds are normal. There is no distension.      Palpations: Abdomen is soft.   Musculoskeletal:         General: Normal range of motion.      Cervical back: Normal range of motion and neck supple.      Right lower leg: No edema.      Left lower leg: No edema.   Skin:     General: Skin is warm and dry.      Capillary Refill: Capillary refill takes less than 2 seconds.      Findings: No lesion.      Nails: There is no clubbing.      Comments: Subxyphoid incision with post-op dressing   Neurological:      General: No focal deficit present.      Mental Status: She is alert and oriented to person, place, and time. Mental status is at baseline.      Cranial Nerves: Cranial nerves 2-12 are intact.      Sensory: Sensation is intact.      Motor: Motor function is intact.   Psychiatric:         Attention and Perception: Attention and perception normal.         Mood and Affect: Mood normal.         Speech: Speech normal.         Behavior: Behavior normal.         Thought Content: Thought content normal.         Cognition and Memory: Cognition and memory normal.         Judgment: Judgment normal.         Last Recorded Vitals  Vitals:    08/28/24 0900 08/28/24 1000 08/28/24 1100 08/28/24 1200   BP:    92/64   BP Location:    Right arm   Patient Position:    Sitting   Pulse: 94 96 98 95   Resp: 15 12 24 21   Temp:       TempSrc:       SpO2: 99% 99% 95% 95%   Weight:        Height:            Intake/Output last 3 Shifts:  I/O last 3 completed shifts:  In: 4233.3 (69.9 mL/kg) [P.O.:240; I.V.:2393.3 (39.5 mL/kg); IV Piggyback:1600]  Out: 765 (12.6 mL/kg) [Urine:675 (0.3 mL/kg/hr); Chest Tube:90]  Weight: 60.6 kg     Inpatient Medications  albumin human, , ,   cefTRIAXone, 1 g, intravenous, q24h  [Held by provider] heparin (porcine), 5,000 Units, subcutaneous, q8h  polyethylene glycol, 17 g, oral, Daily      Continuous medications     PRN medications  PRN medications: acetaminophen **OR** acetaminophen **OR** acetaminophen, albumin human, benzonatate, guaiFENesin, HYDROmorphone, ibuprofen, ipratropium-albuteroL, melatonin, ondansetron     LDA:       Relevant Results  Lab Review  Results from last 7 days   Lab Units 08/28/24  0443   WBC AUTO x10*3/uL 6.1   HEMOGLOBIN g/dL 11.0*   HEMATOCRIT % 33.5*   PLATELETS AUTO x10*3/uL 387     Results from last 7 days   Lab Units 08/28/24  0443 08/25/24  0601 08/25/24  0058   SODIUM mmol/L 134*   < > 134*   POTASSIUM mmol/L 4.6   < > 4.0   CHLORIDE mmol/L 103   < > 99   CO2 mmol/L 24   < > 23   BUN mg/dL 15   < > 15   CREATININE mg/dL 0.50   < > 0.66   CALCIUM mg/dL 8.8   < > 9.9   PROTEIN TOTAL g/dL  --   --  7.2   BILIRUBIN TOTAL mg/dL  --   --  0.8   ALK PHOS U/L  --   --  45   ALT U/L  --   --  6*   AST U/L  --   --  15   GLUCOSE mg/dL 113*   < > 107*    < > = values in this interval not displayed.     Results from last 7 days   Lab Units 08/28/24  0443   MAGNESIUM mg/dL 1.91     Results from last 7 days   Lab Units 08/27/24  1353   POCT PH, ARTERIAL pH 7.46*   POCT PCO2, ARTERIAL mm Hg 33*   POCT PO2, ARTERIAL mm Hg 454*   POCT HCO3 CALCULATED, ARTERIAL mmol/L 23.5   POCT BASE EXCESS, ARTERIAL mmol/L 0.0         Cardiology Tests     Echo:  Transthoracic Echo (TTE) Complete 08/26/2024  PHYSICIAN INTERPRETATION:  Left Ventricle: Left ventricular ejection fraction is normal, by visual estimate at 60-65%. There are no regional left ventricular wall  motion abnormalities. The left ventricular cavity size is normal. Spectral Doppler shows a normal pattern of left ventricular diastolic filling.  Left Atrium: The left atrium is normal in size.  Right Ventricle: The right ventricle is small in size. There is normal right ventricular global systolic function.  Right Atrium: The right atrium is small in size.  Aortic Valve: The aortic valve was not well visualized. There is no evidence of aortic valve regurgitation.  Mitral Valve: The mitral valve is normal in structure. There is trace mitral valve regurgitation.  Tricuspid Valve: The tricuspid valve is structurally normal. No evidence of tricuspid regurgitation.  Pulmonic Valve: The pulmonic valve is not well visualized. There is no indication of pulmonic valve regurgitation.  Pericardium: There is a large pericardial effusion.  Aorta: The aortic root is normal.        CONCLUSIONS:   1. Left ventricular ejection fraction is normal, by visual estimate at 60-65%.   2. There is normal right ventricular global systolic function.   3. There is a large pericardial effusion.   4. Echocardiographic findings are consistent with cardiac tamponade.    Transthoracic echo (TTE) limited 05/06/2024  PHYSICIAN INTERPRETATION:  Left Ventricle: The left ventricular systolic function is normal, with an estimated ejection fraction of 60%. There are no regional wall motion abnormalities. The left ventricular cavity size is normal. Spectral Doppler shows a normal pattern of left ventricular diastolic filling.  Left Atrium: The left atrium is normal in size.  Right Ventricle: The right ventricle is normal in size. There is normal right ventricular global systolic function.  Right Atrium: The right atrium is normal in size.  Aortic Valve: The aortic valve appears structurally normal. There is no evidence of aortic valve regurgitation. The peak instantaneous gradient of the aortic valve is 6.3 mmHg. The mean gradient of the aortic valve is  3.6 mmHg.  Mitral Valve: The mitral valve is normal in structure. There is no evidence of mitral valve regurgitation.  Tricuspid Valve: The tricuspid valve is structurally normal. No evidence of tricuspid regurgitation.  Pulmonic Valve: The pulmonic valve is structurally normal. There is no indication of pulmonic valve regurgitation.  Pericardium: There is a small pericardial effusion anterior to the right ventricle.  Aorta: The aortic root is normal.        CONCLUSIONS:   1. Left ventricular systolic function is normal with a 60% estimated ejection fraction.   2. Small pericardial effusion located anterior to the right ventricle.     Transthoracic Echo (TTE) Complete 05/02/2024  PHYSICIAN INTERPRETATION:  Left Ventricle: The left ventricular systolic function is normal, with an estimated ejection fraction of 55-60%. There are no regional wall motion abnormalities. The left ventricular cavity size is normal. Left ventricular diastolic filling was not assessed.  Left Atrium: The left atrium was not assessed.  Right Ventricle: The right ventricle was not assessed. Right ventricular systolic function not assessed.  Right Atrium: The right atrium was not assessed.  Aortic Valve: The aortic valve was not assessed. Aortic valve regurgitation was not assessed.  Mitral Valve: The mitral valve was not assessed. Mitral valve regurgitation was not assessed.  Tricuspid Valve: The tricuspid valve was not assessed. Tricuspid regurgitation was not assessed.  Pulmonic Valve: The pulmonic valve was not assessed. Pulmonic valve regurgitation was not assessed.  Pericardium: There is a large pericardial effusion. There is right ventricular diastolic collapse, is brief right atrial diastolic collapse and is inferior vena caval plethora. These findings are consistent with cardiac tamponade.  Aorta: The aortic root was not assessed.  Systemic Veins: The inferior vena cava appears mildly dilated.        CONCLUSIONS:   1. Left ventricular  systolic function is normal with a 55-60% estimated ejection fraction.   2. Echocardiographic findings are consistent with cardiac tamponade.   3. There is a large pericardial effusion.     Transthoracic echo (TTE) limited 04/15/2024  PHYSICIAN INTERPRETATION:  Left Ventricle: The left ventricular systolic function is normal, with an estimated ejection fraction of 65%. There are no regional wall motion abnormalities. The left ventricular cavity size is normal. Left ventricular diastolic filling was not assessed.  Left Atrium: The left atrium is normal in size.  Right Ventricle: The right ventricle is normal in size. There is normal right ventricular global systolic function.  Right Atrium: The right atrium is normal in size. Mild right atrial collapse.  Aortic Valve: The aortic valve appears structurally normal. There is no evidence of aortic valve regurgitation.  Mitral Valve: The mitral valve is normal in structure. There is no evidence of mitral valve regurgitation.  Tricuspid Valve: The tricuspid valve is structurally normal. There is trace tricuspid regurgitation. The Doppler estimated RVSP is within normal limits at 16.1 mmHg.  Pulmonic Valve: The pulmonic valve is not well visualized. The pulmonic valve regurgitation was not well visualized.  Pericardium: There is a large pericardial effusion.  Aorta: The aortic root is normal.        CONCLUSIONS:   1. Left ventricular systolic function is normal with a 65% estimated ejection fraction.   2. Mild right atrial collapse.   3. There is a large pericardial effusion.   4. RVSP within normal limits.   5. In comparisn to the previous study of 3/15/24, there has been an increase in size of the pericardial effusion.     Onco-Echo Complete (Strain And 3D) 03/15/2024  PHYSICIAN INTERPRETATION:  Left Ventricle: The left ventricular systolic function is hyperdynamic, with an estimated ejection fraction of 65-70%. There are no regional wall motion abnormalities. The left  ventricular cavity size is normal. Spectral Doppler shows a normal pattern of left ventricular diastolic filling.  Left Atrium: The left atrium is normal in size.  Right Ventricle: The right ventricle is normal in size. There is normal right ventricular global systolic function.  Right Atrium: The right atrium is normal in size.  Aortic Valve: The aortic valve appears structurally normal. There is no evidence of aortic valve regurgitation. The peak instantaneous gradient of the aortic valve is 3.8 mmHg. The mean gradient of the aortic valve is 2.2 mmHg.  Mitral Valve: The mitral valve is normal in structure. There is no evidence of mitral valve regurgitation.  Tricuspid Valve: The tricuspid valve is structurally normal. No evidence of tricuspid regurgitation.  Pulmonic Valve: The pulmonic valve is structurally normal. There is no indication of pulmonic valve regurgitation.  Pericardium: There is a moderately sized pericardial effusion. The effusion is circumferential.  Aorta: The aortic root is normal.  Systemic Veins: The inferior vena cava was not well visualized.     ONCO-CARDIOLOGY:  Vital Signs: The patients heart rate during the study was 75 beats per minute.  The patients blood pressure was 102/56 during the study.  Machine: This study was performed on the [employer] E-9.  Previous Study: The patient had a previous Onco-Cardiology echocardiogram dated  5/31/2023. The patient's previous study was performed on the [employer].        Onco-Cardiology Measurements:  Historical Measurements from Previous Study  2D EF (Biplane)                     63%  Global Longitudinal Strain (GLS) -22.3%     Current Measurements  2D EF (Biplane)                     64%  Global Longitudinal Strain (GLS) -20.6%     GLS Tracking Quality: Fair        CONCLUSIONS:   1. Left ventricular systolic function is hyperdynamic with a 65-70% estimated ejection fraction.   2. There is a moderate pericardial effusion.   3. The pericardial effusion is new  in comparison to the study of 5/31/23.   4. Global longitudinal strain is normal at -20%.   5. No hemodynamic evidence of pericardial tamponade.         History of Present Illness: Ayesha Nova is a 54 y.o. female with a PMH significant for longstanding recurrent metastatic breast cancer (initial dx 2016 - ductal carcinoma [G3, ER +, HER2/della amplified] with metastatic lesions to bilateral lungs - has declined treatment), LLL Lung Nodule (S/p surgical resection in 2018), Malignant Pericardial Effusion (S/p pericardial window 5/2/24) who [facility] to [facility] on [facility]25/2024 with complaints of chest discomfort and tachycardia.     ED Course: Patient vitals were notable for hypotension in 85-95 systolic range and tachycardia. Labs were unremarkable and troponin was flat. EKG was notable for sinus tachycardia with low voltage. CT angio of the chest showed an increase in pericardial effusion. Loculated right pleural effusion was also present, as well as central cavitations in the right lung apex that were suggestive of either pneumonia or a metastatic lesion. No pulmonary emboli were noted. The patient was given a 500mL NS bolus, followed by another 1L bolus. She was also given 0.2mg dilaudid and Zofran for pain management and nausea.      Given the patients CT imaging revealing a Right pleural effusion and a pericardial effusion, cardiac surgery was consulted for evaluation & management.      Daily Events    8/26/24: No acute events ovenright; patient asymptomatic at rest. Plan for R thoracentesis today. TTE observed this AM showing a moderately large anterior pericardial effusion. Plan for OR tomorrow afternoon for a pericardial window creation.      8/27/24: Seen s/p pericardial window with Dr. Giles. Uneventful procedure. On facemask, in bed. Continue multimodal pain management per primary team.     8/28/24: No acute events overnight; patient with softer BP's this AM, giving albumin.  Otherwise, chest tube with low-moderate output, plan to maintain today.     - Current O2 Requirements: 2L NC     Assessment & Plan      Malignant Pericardial Effusion   - Secondary to underlying metastatic breast cancer   - S/p Urgent Pericardial Window on 5/2/24 with Cardiac Surgeon, Dr. Saad Giles   - Patient presented to the ED with tachycardia and dyspnea on 8/25  --> CT chest showing fluid around the pericardium   - TTE on 8/26 showing recurrent of pericardial fluid with a moderate sized effusion   - Now s/p redo pericardial window on 8/27 with Dr. Giles  --> maintain mediastinal chest tube to -20 cm wall suction  - Follow CXRs        Malignant Pleural Effusion   - Right loculated effusion appreciated on 8/25 CT chest  - Current O2 Requirements: 2L NC   - S/p IR guided thoracentesis today on 8/26/24  --> Cytology pending  - Continue to follow CXrs     Above patient and plan discussed with cardiac surgeon, Dr. Giles. Plan as above. Will continue to follow along.     Jason Ellison, LEANDER-CNP

## 2024-08-28 NOTE — PROGRESS NOTES
Internal Medicine Progress Note         Ayesha Nova is a 54 y.o. female on day 3 of admission presenting with Chest pain, unspecified type.    SUBJECTIVE  Patient is post-pericardial window. Sheappears more energetic and says she feels better. She says her pain has improved and that she slept well.     OBJECTIVE    Vitals:    08/28/24 0900 08/28/24 1000 08/28/24 1100 08/28/24 1200   BP:    92/64   BP Location:    Right arm   Patient Position:    Sitting   Pulse: 94 96 98 95   Resp: 15 12 24 21   Temp:       TempSrc:       SpO2: 99% 99% 95% 95%   Weight:       Height:          Results from last 7 days   Lab Units 08/28/24 0443 08/25/24  0601 08/25/24  0058   WBC AUTO x10*3/uL 6.1   < > 11.0   HEMOGLOBIN g/dL 11.0*   < > 12.0   HEMATOCRIT % 33.5*   < > 36.9   PLATELETS AUTO x10*3/uL 387   < > 397   NEUTROS PCT AUTO %  --   --  76.8   LYMPHS PCT AUTO %  --   --  15.1   MONOS PCT AUTO %  --   --  6.8   EOS PCT AUTO %  --   --  0.5    < > = values in this interval not displayed.     Results from last 7 days   Lab Units 08/28/24 0443 08/25/24  0601 08/25/24  0058   SODIUM mmol/L 134*   < > 134*   POTASSIUM mmol/L 4.6   < > 4.0   CHLORIDE mmol/L 103   < > 99   CO2 mmol/L 24   < > 23   BUN mg/dL 15   < > 15   CREATININE mg/dL 0.50   < > 0.66   CALCIUM mg/dL 8.8   < > 9.9   PROTEIN TOTAL g/dL  --   --  7.2   BILIRUBIN TOTAL mg/dL  --   --  0.8   ALK PHOS U/L  --   --  45   ALT U/L  --   --  6*   AST U/L  --   --  15   GLUCOSE mg/dL 113*   < > 107*    < > = values in this interval not displayed.       Scheduled Medications  albumin human, , ,   cefTRIAXone, 1 g, intravenous, q24h  [Held by provider] heparin (porcine), 5,000 Units, subcutaneous, q8h  polyethylene glycol, 17 g, oral, Daily           Physical Exam  General: alert and oriented. No acute distress.  HEENT: oral mucosa moist, EOMI. No JVD.   CHEST: Mild wheezing heard on auscultation.  CVS: regular rate  and rhythm, no murmurs.   ABD: Soft, Non Tender, BS present.  EXT: no edema.  SKIN: no rash.  NEURO: Nonfocal grossly.                ASSESSMENT & PLAN  Ayesha Nova is a 54 y.o. female with a history of metastatic breast cancer, cardiac tamponade s/p pericardial window, and hypothyroidism who presented to the ED on 8/25 for chest pain that intensified without improvement from NSAIDs a few hours prior to her arrival. She had a thoracentesis performed on 8/26, and pericardial window was performed on 8/27.    #Chest pain - improving  #Recurrent pericardial effusion likely 2/2 metastatic right breast cancer s/p lumpectomy in 2016  - Patient had pericardial window performed 8/27 with no complications. She currently has a pericardial catheter placed and draining fluid. Per cardiology the catheter will likely be removed later today.  - 270mL of straw colored fluid was removed from the pericardial sac. The fluid and a 2 cm oval of pericardial tissue were sent for cytology. TTE limited from today is relevant for LVEF 55-60 and no pericardial effusion noted.  - Cough is still present which elicits pain. Patient has expressed wish to wean off toradol.  - Diet was advanced to regular.   - Albumin 12.5g administered today in setting of procedure.  PLAN:  - Continue tylenol and ibuprofen 400mg for pain management.  - BP is around 80-100s systolic, continue to monitor. Patient is currently not experiencing other symptoms besides pain.      #Right lung opacity, suspected pneumonia vs. metastatic lesion  #Right lobar pulmonary artery encasement 2/2 R upper lobe and hilar mass  -CT imaging was notable for an ambiguous opacity that could represent pneumonia or a metastatic lesion. Her most recent WBC count is 6.1. Her cough has decreased in frequency but is still present.   - Patient is open to pursuing treatment for her cancer after discussing the etiology of the recurrent effusions.  PLAN:  - Continue rocephin 1g and azithromycin  500mg daily. Monitor CBC.  - Continue duoneb, Tessalon, and robitussin PRN.  - Follow up outpatient with heme/onc.    #Worsening loculated moderate right pleural effusion   - Per pulmonary it is likely that the patient will need a pleural catheter in the future.  - On examination the patient was receiving 2.5L NC O2. The cannula was removed for the duration of the exam, and she continued to saturate well.  PLAN:  - Patient NC turned off.  - Continue to monitor SpO2 and O2 requirements.    #Hypotension  - Patient arrived to ED hypotensive and tachycardic. She received 1.5L of NS, and pressures remained soft. She received another 500mL bolus on 8/26 and receiving a continuous infusion afterward.  - Patient's BP following pericardial window trending around  systolic. Not reporting any symptoms currently.  PLAN:  - LR infusion not continued today. Continue to monitor the patient's blood pressure.    Chronic Conditions  #Hypothyroidism  PLAN:  - Patient has hx of hypothyroidism but denies taking any medications for it. TSH is normal.    DVT ppx: -  GI ppx: -  IVF: -  Diet: Adult regular  Consults: Cardiology, cardiothoracic surgery, pulmonology  CODE STATUS: Full code    Mega Vergara MD   Please SecureChat for any further questions  This is a preliminary note, please await attending attestation for final A/P

## 2024-08-28 NOTE — PROGRESS NOTES
Subjective Data:  Sinus rhythm.  Underwent pericardial window yesterday.  No acute issues overnight.       Objective Data:  Last Recorded Vitals:  Vitals:    08/28/24 0500 08/28/24 0600 08/28/24 0700 08/28/24 0800   BP:       BP Location:       Patient Position:       Pulse: 86 86 98 93   Resp: 10 15 23 24   Temp:    36.1 °C (97 °F)   TempSrc:    Temporal   SpO2: 99% 99%  99%   Weight:       Height:           Last Labs:  CBC - 8/28/2024:  4:43 AM  6.1 11.0 387    33.5      CMP - 8/28/2024:  4:43 AM  8.8 7.2 15 --- 0.8   2.9 3.3 6 45      PTT - 8/27/2024:  2:22 PM  1.2   14.0 31     TROPHS   Date/Time Value Ref Range Status   08/25/2024 01:58 AM 4 0 - 13 ng/L Final   08/25/2024 12:58 AM 4 0 - 13 ng/L Final   06/03/2024 03:44 PM <3 0 - 13 ng/L Final     BNP   Date/Time Value Ref Range Status   05/01/2024 07:24 PM 21 0 - 99 pg/mL Final   03/07/2024 01:03 PM 35 0 - 99 pg/mL Final     VLDL   Date/Time Value Ref Range Status   08/23/2019 08:50 AM 13 0 - 40 mg/dL Final      Last I/O:  I/O last 3 completed shifts:  In: 4233.3 (69.9 mL/kg) [P.O.:240; I.V.:2393.3 (39.5 mL/kg); IV Piggyback:1600]  Out: 765 (12.6 mL/kg) [Urine:675 (0.3 mL/kg/hr); Chest Tube:90]  Weight: 60.6 kg     Past Cardiology Tests (Last 3 Years):  EKG:  ECG 12 lead 06/03/2024      Electrocardiogram, 12-lead PRN ACS symptoms 05/03/2024      ECG 12 Lead 05/02/2024      Electrocardiogram, 12-lead PRN ACS symptoms 05/01/2024      ECG 12 lead 05/01/2024      ECG 12 lead 03/07/2024    Echo:  Transthoracic Echo (TTE) Complete 08/26/2024      Transthoracic echo (TTE) limited 05/06/2024      Transesophageal Echo (DIAN)       Transthoracic Echo (TTE) Complete 05/02/2024      Transthoracic echo (TTE) limited 04/15/2024      Onco-Echo Complete (Strain And 3D) 03/15/2024    Ejection Fractions:  EF   Date/Time Value Ref Range Status   08/26/2024 09:03 AM 63 %    03/15/2024 12:43 PM 50 %      Cath:  No results found for this or any previous visit from the past 1095  days.    Stress Test:  No results found for this or any previous visit from the past 1095 days.    Cardiac Imaging:  No results found for this or any previous visit from the past 1095 days.      Inpatient Medications:  Scheduled medications   Medication Dose Route Frequency    albumin human  12.5 g intravenous Once    cefTRIAXone  1 g intravenous q24h    [Held by provider] heparin (porcine)  5,000 Units subcutaneous q8h    perflutren lipid microspheres  0.5-10 mL of dilution intravenous Once in imaging    polyethylene glycol  17 g oral Daily     PRN medications   Medication    acetaminophen    Or    acetaminophen    Or    acetaminophen    benzonatate    guaiFENesin    HYDROmorphone    ibuprofen    ipratropium-albuteroL    melatonin    ondansetron     Continuous Medications   Medication Dose Last Rate       Physical Exam:  Physical Exam:  Constitutional:       General: Mild respiratory distress.  Sitting up     HENT:      Head: Normocephalic and atraumatic.      Nose: Nose normal.      Mouth/Throat:      Mouth: Mucous membranes are moist.      Pharynx: Oropharynx is clear.   Eyes:      Extraocular Movements: Extraocular movements intact.      Conjunctiva/sclera: Conjunctivae normal.      Pupils: Pupils are equal, round, and reactive to light.   Cardiovascular:   Tachycardic, regular  Pericardial drain is in place.     Pulmonary:   Breath sounds have improved significantly     Abdominal:      General: Abdomen is flat. Bowel sounds are normal. There is no distension.      Palpations: Abdomen is soft.   Musculoskeletal:         General: Normal range of motion.      Cervical back: Normal range of motion and neck supple.      Right lower leg: No edema.      Left lower leg: No edema.   Skin:     General: Skin is warm and dry.      Capillary Refill: Capillary refill takes less than 2 seconds.      Findings: No lesion.      Nails: There is no clubbing.   Neurological:      General: No focal deficit present.      Mental  Status: She is alert and oriented to person, place, and time. Mental status is at baseline.      Cranial Nerves: Cranial nerves 2-12 are intact.      Sensory: Sensation is intact.      Motor: Motor function is intact.   Psychiatric:         Attention and Perception: Attention and perception normal.         Mood and Affect: Mood normal.         Speech: Speech normal.         Behavior: Behavior normal.         Thought Content: Thought content normal.         Cognition and Memory: Cognition and memory normal.         Judgment: Judgment normal.            Assessment/Plan  Malignant Pericardial Effusion   - Secondary to underlying metastatic breast cancer   - S/p Urgent Pericardial Window on 5/2/24 with Cardiac Surgeon, Dr. Saad Giles   - Patient presented to the ED with tachycardia and dyspnea on 8/25  --> CT chest showing fluid around the pericardium   - Patient is with tachycardia (HR 100s) however no hypotension         Malignant Pleural Effusion   - Right loculated effusion appreciated on 8/25 CT chest  - Current O2 Requirements: RA  - Plan for Inpatient thoracentesis           Patient has recurrent pericardial effusion which appears to be large.  She also has significant right-sided pleural effusion and has significant shortness of breath now causing her to sit up.  She is planned to get thoracentesis later this morning.  I discussed the case with CT surgery and I believe it would be reasonable to proceed with redo pericardial window as without other functioning window, despite pericardiocentesis the fluid reaccumulation and pericardial will be back very likely as it appears to have malignant origin.  Also she may need evaluation by thoracic surgery team for consideration of Pleurx catheter      will continue to monitor her closely in heart center for time being.     8/27/2024  Hemodynamics are relatively stable.  1 L of fluid was removed from right lung yesterday with thoracentesis.  Will likely require  kamar.  Going for pericardial window today by CT surgery team.    8/28/2024  She underwent successful pericardial window placement yesterday.  Pericardial drain to be managed by CT surgery team and likely come out later today.  Continue current medications from cardiac standpoint.  She will be following with oncology for managing her cancer.  Will obtain a limited echocardiogram to reevaluate pericardial effusion.      ICU Attending Note    This critically ill patient continues to be at-risk for clinically significant deterioration / failure due to the above mentioned dysfunctional, unstable organ systems.  I have personally identified and managed all complex critical care issues to prevent aforementioned clinical deterioration.  Critical care time is spent at bedside and/or the immediate area and has included, but is not limited to, the review of diagnostic tests, labs, radiographs, serial assessments of hemodynamics, respiratory status, ventilatory management, and family updates. More than 50% of the time was spent for coordination of care/family education.    Critical care time: 45 minutes      Sin Morillo MD, PhD, [facility], [facility]  Interventional Cardiology, [facility] [place]  Associate Professor of Medicine, [facility]  Office: [phone]

## 2024-08-29 ENCOUNTER — PHARMACY VISIT (OUTPATIENT)
Dept: PHARMACY | Facility: CLINIC | Age: 54
End: 2024-08-29
Payer: MEDICARE

## 2024-08-29 ENCOUNTER — APPOINTMENT (OUTPATIENT)
Dept: RADIOLOGY | Facility: HOSPITAL | Age: 54
DRG: 163 | End: 2024-08-29
Payer: COMMERCIAL

## 2024-08-29 VITALS
RESPIRATION RATE: 18 BRPM | HEIGHT: 62 IN | BODY MASS INDEX: 25.76 KG/M2 | DIASTOLIC BLOOD PRESSURE: 52 MMHG | OXYGEN SATURATION: 96 % | SYSTOLIC BLOOD PRESSURE: 103 MMHG | WEIGHT: 140 LBS | TEMPERATURE: 97.2 F | HEART RATE: 92 BPM

## 2024-08-29 DIAGNOSIS — Z98.890 S/P PERICARDIAL WINDOW CREATION: ICD-10-CM

## 2024-08-29 PROBLEM — I31.39 PERICARDIAL EFFUSION WITH CARDIAC TAMPONADE (HHS-HCC): Status: RESOLVED | Noted: 2024-03-15 | Resolved: 2024-08-29

## 2024-08-29 PROBLEM — R07.9 CHEST PAIN, UNSPECIFIED TYPE: Status: RESOLVED | Noted: 2024-08-25 | Resolved: 2024-08-29

## 2024-08-29 PROBLEM — I31.31: Status: RESOLVED | Noted: 2024-08-25 | Resolved: 2024-08-29

## 2024-08-29 PROBLEM — I31.4 PERICARDIAL EFFUSION WITH CARDIAC TAMPONADE (HHS-HCC): Status: RESOLVED | Noted: 2024-03-15 | Resolved: 2024-08-29

## 2024-08-29 PROBLEM — I31.39 PERICARDIAL EFFUSION (HHS-HCC): Status: RESOLVED | Noted: 2024-08-25 | Resolved: 2024-08-29

## 2024-08-29 LAB
ANION GAP SERPL CALC-SCNC: 10 MMOL/L (ref 10–20)
BACTERIA FLD CULT: NORMAL
BUN SERPL-MCNC: 14 MG/DL (ref 6–23)
CALCIUM SERPL-MCNC: 9.1 MG/DL (ref 8.6–10.3)
CHLORIDE SERPL-SCNC: 104 MMOL/L (ref 98–107)
CO2 SERPL-SCNC: 28 MMOL/L (ref 21–32)
CREAT SERPL-MCNC: 0.67 MG/DL (ref 0.5–1.05)
EGFRCR SERPLBLD CKD-EPI 2021: >90 ML/MIN/1.73M*2
EJECTION FRACTION: 50 %
ERYTHROCYTE [DISTWIDTH] IN BLOOD BY AUTOMATED COUNT: 14 % (ref 11.5–14.5)
GLUCOSE SERPL-MCNC: 84 MG/DL (ref 74–99)
GRAM STN SPEC: NORMAL
GRAM STN SPEC: NORMAL
HCT VFR BLD AUTO: 32.2 % (ref 36–46)
HGB BLD-MCNC: 10.5 G/DL (ref 12–16)
MAGNESIUM SERPL-MCNC: 1.96 MG/DL (ref 1.6–2.4)
MCH RBC QN AUTO: 28.2 PG (ref 26–34)
MCHC RBC AUTO-ENTMCNC: 32.6 G/DL (ref 32–36)
MCV RBC AUTO: 86 FL (ref 80–100)
NRBC BLD-RTO: 0 /100 WBCS (ref 0–0)
PLATELET # BLD AUTO: 403 X10*3/UL (ref 150–450)
POTASSIUM SERPL-SCNC: 3.9 MMOL/L (ref 3.5–5.3)
RBC # BLD AUTO: 3.73 X10*6/UL (ref 4–5.2)
SODIUM SERPL-SCNC: 138 MMOL/L (ref 136–145)
WBC # BLD AUTO: 6.3 X10*3/UL (ref 4.4–11.3)

## 2024-08-29 PROCEDURE — 82374 ASSAY BLOOD CARBON DIOXIDE: CPT

## 2024-08-29 PROCEDURE — 2500000004 HC RX 250 GENERAL PHARMACY W/ HCPCS (ALT 636 FOR OP/ED)

## 2024-08-29 PROCEDURE — 36415 COLL VENOUS BLD VENIPUNCTURE: CPT

## 2024-08-29 PROCEDURE — 71045 X-RAY EXAM CHEST 1 VIEW: CPT

## 2024-08-29 PROCEDURE — 2500000001 HC RX 250 WO HCPCS SELF ADMINISTERED DRUGS (ALT 637 FOR MEDICARE OP)

## 2024-08-29 PROCEDURE — 83735 ASSAY OF MAGNESIUM: CPT

## 2024-08-29 PROCEDURE — RXMED WILLOW AMBULATORY MEDICATION CHARGE

## 2024-08-29 PROCEDURE — 2500000001 HC RX 250 WO HCPCS SELF ADMINISTERED DRUGS (ALT 637 FOR MEDICARE OP): Performed by: NURSE PRACTITIONER

## 2024-08-29 PROCEDURE — 85027 COMPLETE CBC AUTOMATED: CPT

## 2024-08-29 PROCEDURE — 99233 SBSQ HOSP IP/OBS HIGH 50: CPT | Performed by: STUDENT IN AN ORGANIZED HEALTH CARE EDUCATION/TRAINING PROGRAM

## 2024-08-29 PROCEDURE — 71045 X-RAY EXAM CHEST 1 VIEW: CPT | Performed by: RADIOLOGY

## 2024-08-29 PROCEDURE — 99233 SBSQ HOSP IP/OBS HIGH 50: CPT | Performed by: NURSE PRACTITIONER

## 2024-08-29 RX ORDER — FUROSEMIDE 20 MG/1
20 TABLET ORAL DAILY
Qty: 30 TABLET | Refills: 0 | Status: SHIPPED | OUTPATIENT
Start: 2024-08-29 | End: 2024-09-12 | Stop reason: SDUPTHER

## 2024-08-29 RX ORDER — KETOROLAC TROMETHAMINE 30 MG/ML
15 INJECTION, SOLUTION INTRAMUSCULAR; INTRAVENOUS ONCE
Status: COMPLETED | OUTPATIENT
Start: 2024-08-29 | End: 2024-08-29

## 2024-08-29 RX ORDER — COLCHICINE 0.6 MG/1
0.6 TABLET ORAL DAILY
Status: DISCONTINUED | OUTPATIENT
Start: 2024-08-29 | End: 2024-08-29 | Stop reason: HOSPADM

## 2024-08-29 RX ORDER — COLCHICINE 0.6 MG/1
0.6 TABLET ORAL DAILY
Qty: 30 TABLET | Refills: 0 | Status: SHIPPED | OUTPATIENT
Start: 2024-08-29 | End: 2024-09-28

## 2024-08-29 RX ADMIN — IBUPROFEN 400 MG: 400 TABLET, FILM COATED ORAL at 00:52

## 2024-08-29 RX ADMIN — COLCHICINE 0.6 MG: 0.6 TABLET, FILM COATED ORAL at 14:53

## 2024-08-29 RX ADMIN — KETOROLAC TROMETHAMINE 15 MG: 30 INJECTION, SOLUTION INTRAMUSCULAR at 06:41

## 2024-08-29 ASSESSMENT — ENCOUNTER SYMPTOMS
VOMITING: 0
DIZZINESS: 0
ORTHOPNEA: 0
FREQUENCY: 0
NAUSEA: 0
SHORTNESS OF BREATH: 0
LIGHT-HEADEDNESS: 0
POOR WOUND HEALING: 0
ALTERED MENTAL STATUS: 0
STIFFNESS: 0
DECREASED APPETITE: 0
PALPITATIONS: 0
MYALGIAS: 0
DOUBLE VISION: 0
DYSPNEA ON EXERTION: 0
IRREGULAR HEARTBEAT: 0
BLURRED VISION: 0
COUGH: 1
NAIL CHANGES: 0
FOCAL WEAKNESS: 0
MUSCLE CRAMPS: 0
BLOATING: 0
WEAKNESS: 1

## 2024-08-29 ASSESSMENT — PAIN SCALES - GENERAL
PAINLEVEL_OUTOF10: 6
PAINLEVEL_OUTOF10: 6
PAINLEVEL_OUTOF10: 8
PAINLEVEL_OUTOF10: 0 - NO PAIN
PAINLEVEL_OUTOF10: 1
PAINLEVEL_OUTOF10: 0 - NO PAIN
PAINLEVEL_OUTOF10: 0 - NO PAIN

## 2024-08-29 ASSESSMENT — PAIN - FUNCTIONAL ASSESSMENT
PAIN_FUNCTIONAL_ASSESSMENT: 0-10

## 2024-08-29 ASSESSMENT — PAIN DESCRIPTION - DESCRIPTORS: DESCRIPTORS: ACHING

## 2024-08-29 NOTE — DISCHARGE SUMMARY
Discharge Diagnosis  Chest pain, unspecified type  Malignant pericardial effusion  Right sided loculated pleural effusion  Hypotension    Issues Requiring Follow-Up  Follow up with PCP after discharge.  Follow up with oncology after discharge regarding cancer management.    Discharge Meds     Your medication list        START taking these medications        Instructions Last Dose Given Next Dose Due   colchicine 0.6 mg tablet      Take 1 tablet (0.6 mg) by mouth once daily.       furosemide 20 mg tablet  Commonly known as: Lasix      Take 1 tablet (20 mg) by mouth once daily.              CONTINUE taking these medications        Instructions Last Dose Given Next Dose Due   hydrocortisone-pramoxine 1-1 % rectal cream  Commonly known as: Analpram-HC                  STOP taking these medications      clindamycin 150 mg capsule  Commonly known as: Cleocin                  Where to Get Your Medications        These medications were sent to [facility]  [address] [place] [address]      Hours: 8:30 AM to 5:00 PM Mon - Fri Phone: [phone]   colchicine 0.6 mg tablet  furosemide 20 mg tablet         Test Results Pending At Discharge  Pending Labs       Order Current Status    Cytology (Non-Gynecologic) In process    Cytology Consultation (Non-Gynecologic) In process    Surgical Pathology Exam In process    AFB Culture/Smear Preliminary result    Fungal Culture/Smear Preliminary result            Hospital Course  Patient is a 54 y.o. female with PMHx of metastatic breast cancer, cardiac tamponade s/p pericardial window 05/2024, and hypothyroidism who presented to the ED on 8/25 for chest pain and tachycardia. She reported chest pain that never fully resolved since her pericardial window procedure in May. In the ED, EKG was notable for low voltage consistent with pericardial effusion. CT imaging showed an opacity in the right lung that was suggestive for pneumonia/metastatic lesion. A worsening  pleural effusion was also found when compared to previous imaging. She received 1.5L of NS to improve her hypotension. She was started on 400mg ibuprofen for pain, rocephin/azithromycin for suspected pneumonia, duonebs, and admitted to the heart center.     Cardiology and cardiothoracic surgery were consulted and recommended redoing the pericardial window. Pulmonary was consulted and performed a thoracentesis, which yield exudative fluid suggestive of malignant etiology. The patient was monitored and given a 500mL bolus of LR followed by continuous infusion to keep her pressures increased. On 8/27 the patient underwent a pericardial window procedure, and repeat TTE showed resolution of the pericardial effusion. A pericardial catheter was placed to continue draining the fluid. Her clinical presentation and pain improved post-procedure. Her catheter was removed on 8/29 and she was cleared from a cardiology standpoint. There was a discussion regarding the etiology of her recurrent effusions and pursuing management of her cancer. She was agreeable to this plan and discharged home on 8/29 with colchicine and furosemide.    Pertinent Physical Exam At Time of Discharge  Physical Exam  General: alert and oriented. No acute distress.  HEENT: oral mucosa moist, EOMI. No JVD.   CHEST: Mild wheezing and crackles on auscultation, mostly right sided.  CVS: regular rate and rhythm, no murmurs.   ABD: Soft, Non Tender, BS present.  EXT: no edema.  SKIN: no rash.  NEURO: Nonfocal grossly.      Outpatient Follow-Up  Future Appointments   Date Time Provider Department Center   9/5/2024 10:15 AM TAWANA [id number] CARDSURG NURSE [id number] [place]   9/12/2024 11:00 AM LEANDER Ch-CNP [id number] [place]   11/8/2024 11:00 AM Chas Jorgensen MD [id number] [place]         Mega Vergara MD

## 2024-08-29 NOTE — CONSULTS
PULMONOLOGY CONSULT NOTE       PATIENT NAME: Ayesha Nova  MRN: [id number]    SERVICE DATE:  8/29/2024  SERVICE TIME:  1:37 PM    REASON FOR CONSULT: Loculated Pleural Effusion    Ayesha Nova is a 54 y.o. female on day 4 of admission presenting with Chest pain, unspecified type.  ASSESSMENT & PLAN    #Recurrent pericardial effusion likely 2/2 metastatic breast cancer   #Right lung opacity, suspected pneumonia vs. metastatic lesion  #Right lobar pulmonary artery encasement 2/2 R upper lobe and hilar mass  #Worsening loculated moderate right pleural effusion   #Hypotension   #Tachycardia    -Patient's malignant effusion secondary to metastatic breast cancer that she was diagnosed 8 years ago.  Patient had denied pursuing any medical or surgical interventions for her cancer.  -Patient had a pericardial window in May 2024  -Imaging shows recurrence of the pericardial effusion along with what appears to be a loculated versus malignant pleural effusion    PLAN:  -Patient underwent thoracentesis.  Fluid is exudative and lymphocytic suggestive of malignancy.  -S/P pericardial window on August 27, 2020 for  -I suspect that due to patient's advancement of her cancer, she is can have recurrence of the pericardial effusion as well as the pleural effusion.  Patient may need a Pleurx catheter down the road.  -Continue pain management w  -Symptomatic control for cough with Tessalon Perles  -Patient's overall prognosis is very guarded.         HPI    Ayesha Nova is a 54 y.o. female with medical history significant for metastatic breast cancer, cardiac tamponade status post pericardial window, hypothyroidism presenting to [facility] on 8/25 for chest pain that was started few hours prior to arrival.  During admission patient was found to have a large pericardial effusion as well as a loculated pleural effusion and was admitted for further evaluation.  Of note, patient was admitted back in May 2024 for similar episodes and at that time she  underwent a pericardial window procedure.  Patient noted that since then she was having worsening chest pain and would usually take ibuprofen to relieve the pain however this time around she was unable to show symptoms which prompted her to come to the emergency department for further evaluation.  Patient did recently also finish a 14-day course of doxycycline for a suspected pneumonia.  Pulmonology team was consulted for recurrent right pleural effusion.    Daily progress:  August 27, 2024: Patient had an uneventful night.  She has no fever, chills, rigors.  Breathing has improved.  Patient is scheduled for a pericardial window today.  Chest x-ray was personally reviewed and independently interpreted and residual right-sided pleural effusion and pericardial effusion.  This was discussed with primary team.    August 29, 2024: Patient has been weaned off supplemental oxygen.  She has no fever, chills, or rigors.  She continues to have chest tube in place.  White blood cell count has been normal.  Echocardiography from August 28, 2024 showed improvement in the pericardial effusion.  Chest x-ray from August 29, 2024 showed small loculated right-sided pleural effusion with masslike density.  Case was discussed with primary team.    OBJECTIVE    Vitals:    08/29/24 0745 08/29/24 0800 08/29/24 0815 08/29/24 1200   BP:  98/57     BP Location:       Patient Position:       Pulse: 91 95 94    Resp: 20 (!) 27 24    Temp:  36.7 °C (98.1 °F)  36.2 °C (97.2 °F)   TempSrc:  Temporal  Temporal   SpO2: 96% 96% 97%    Weight:       Height:          Results from last 7 days   Lab Units 08/29/24  0537 08/25/24  0601 08/25/24  0058   WBC AUTO x10*3/uL 6.3   < > 11.0   HEMOGLOBIN g/dL 10.5*   < > 12.0   HEMATOCRIT % 32.2*   < > 36.9   PLATELETS AUTO x10*3/uL 403   < > 397   NEUTROS PCT AUTO %  --   --  76.8   LYMPHS PCT AUTO %  --   --  15.1   MONOS PCT AUTO %  --   --  6.8   EOS PCT AUTO %  --   --  0.5    < > = values in this interval  not displayed.     Results from last 7 days   Lab Units 08/29/24  0537 08/25/24  0601 08/25/24  0058   SODIUM mmol/L 138   < > 134*   POTASSIUM mmol/L 3.9   < > 4.0   CHLORIDE mmol/L 104   < > 99   CO2 mmol/L 28   < > 23   BUN mg/dL 14   < > 15   CREATININE mg/dL 0.67   < > 0.66   CALCIUM mg/dL 9.1   < > 9.9   PROTEIN TOTAL g/dL  --   --  7.2   BILIRUBIN TOTAL mg/dL  --   --  0.8   ALK PHOS U/L  --   --  45   ALT U/L  --   --  6*   AST U/L  --   --  15   GLUCOSE mg/dL 84   < > 107*    < > = values in this interval not displayed.       Scheduled Medications  cefTRIAXone, 1 g, intravenous, q24h  colchicine, 0.6 mg, oral, Daily  [Held by provider] heparin (porcine), 5,000 Units, subcutaneous, q8h  polyethylene glycol, 17 g, oral, Daily  sodium phosphates, 1 enema, rectal, Once           Physical Exam   Physical Exam:  General:  Pleasant and cooperative. No apparent distress.  HEENT:  Normocephalic, atraumatic, mucus membranes moist.   Neck:  Trachea midline.  No JVD.    Chest: Tachycardic  Abdomen: Bowel sounds present in all four quadrants, abdomen is soft, non-tender, non-distended.  Extremities:  No lower extremity edema or cyanosis.   Neurological:  AAOx3. No focal deficits.  Skin:  Warm and dry.

## 2024-08-29 NOTE — CARE PLAN
The patient's goals for the shift include      The clinical goals for the shift include Pt to be HD stable with adequate oxygenation and perfusion, will have CT to suction wihtout air leak, will rest comfortably during the night with adequate pain management

## 2024-08-29 NOTE — PROGRESS NOTES
Subjective Data:  Remains in sinus rhythm.  No acute issues overnight.       Objective Data:  Last Recorded Vitals:  Vitals:    08/29/24 0745 08/29/24 0800 08/29/24 0815 08/29/24 1200   BP:  98/57     BP Location:       Patient Position:       Pulse: 91 95 94    Resp: 20 (!) 27 24    Temp:  36.7 °C (98.1 °F)  36.2 °C (97.2 °F)   TempSrc:  Temporal  Temporal   SpO2: 96% 96% 97%    Weight:       Height:           Last Labs:  CBC - 8/29/2024:  5:37 AM  6.3 10.5 403    32.2      CMP - 8/29/2024:  5:37 AM  9.1 7.2 15 --- 0.8   2.9 3.3 6 45      PTT - 8/27/2024:  2:22 PM  1.2   14.0 31     TROPHS   Date/Time Value Ref Range Status   08/25/2024 01:58 AM 4 0 - 13 ng/L Final   08/25/2024 12:58 AM 4 0 - 13 ng/L Final   06/03/2024 03:44 PM <3 0 - 13 ng/L Final     BNP   Date/Time Value Ref Range Status   05/01/2024 07:24 PM 21 0 - 99 pg/mL Final   03/07/2024 01:03 PM 35 0 - 99 pg/mL Final     VLDL   Date/Time Value Ref Range Status   08/23/2019 08:50 AM 13 0 - 40 mg/dL Final      Last I/O:  I/O last 3 completed shifts:  In: 1070 (16.8 mL/kg) [P.O.:820; Blood:250]  Out: 1965 (30.9 mL/kg) [Urine:1900 (0.8 mL/kg/hr); Chest Tube:65]  Weight: 63.5 kg     Past Cardiology Tests (Last 3 Years):  EKG:  ECG 12 lead 06/03/2024      Electrocardiogram, 12-lead PRN ACS symptoms 05/03/2024      ECG 12 Lead 05/02/2024      Electrocardiogram, 12-lead PRN ACS symptoms 05/01/2024      ECG 12 lead 05/01/2024      ECG 12 lead 03/07/2024    Echo:  Transthoracic Echo (TTE) Complete 08/26/2024      Transthoracic echo (TTE) limited 05/06/2024      Transesophageal Echo (DIAN)       Transthoracic Echo (TTE) Complete 05/02/2024      Transthoracic echo (TTE) limited 04/15/2024      Onco-Echo Complete (Strain And 3D) 03/15/2024    Ejection Fractions:  EF   Date/Time Value Ref Range Status   08/28/2024 11:17 AM 58 %    08/26/2024 09:03 AM 63 %    03/15/2024 12:43 PM 50 %      Cath:  No results found for this or any previous visit from the past 1095  days.    Stress Test:  No results found for this or any previous visit from the past 1095 days.    Cardiac Imaging:  No results found for this or any previous visit from the past 1095 days.      Inpatient Medications:  Scheduled medications   Medication Dose Route Frequency    cefTRIAXone  1 g intravenous q24h    colchicine  0.6 mg oral Daily    [Held by provider] heparin (porcine)  5,000 Units subcutaneous q8h    polyethylene glycol  17 g oral Daily    sodium phosphates  1 enema rectal Once     PRN medications   Medication    acetaminophen    Or    acetaminophen    Or    acetaminophen    benzonatate    guaiFENesin    HYDROmorphone    ibuprofen    ipratropium-albuteroL    melatonin    ondansetron     Continuous Medications   Medication Dose Last Rate       Physical Exam:  Physical Exam:  Constitutional:       General: Mild respiratory distress.  Sitting up     HENT:      Head: Normocephalic and atraumatic.      Nose: Nose normal.      Mouth/Throat:      Mouth: Mucous membranes are moist.      Pharynx: Oropharynx is clear.   Eyes:      Extraocular Movements: Extraocular movements intact.      Conjunctiva/sclera: Conjunctivae normal.      Pupils: Pupils are equal, round, and reactive to light.   Cardiovascular:   Tachycardic, regular  Pericardial drain is in place.     Pulmonary:   Breath sounds have improved significantly     Abdominal:      General: Abdomen is flat. Bowel sounds are normal. There is no distension.      Palpations: Abdomen is soft.   Musculoskeletal:         General: Normal range of motion.      Cervical back: Normal range of motion and neck supple.      Right lower leg: No edema.      Left lower leg: No edema.   Skin:     General: Skin is warm and dry.      Capillary Refill: Capillary refill takes less than 2 seconds.      Findings: No lesion.      Nails: There is no clubbing.   Neurological:      General: No focal deficit present.      Mental Status: She is alert and oriented to person, place, and  time. Mental status is at baseline.      Cranial Nerves: Cranial nerves 2-12 are intact.      Sensory: Sensation is intact.      Motor: Motor function is intact.   Psychiatric:         Attention and Perception: Attention and perception normal.         Mood and Affect: Mood normal.         Speech: Speech normal.         Behavior: Behavior normal.         Thought Content: Thought content normal.         Cognition and Memory: Cognition and memory normal.         Judgment: Judgment normal.            Assessment/Plan  Malignant Pericardial Effusion   - Secondary to underlying metastatic breast cancer   - S/p Urgent Pericardial Window on 5/2/24 with Cardiac Surgeon, Dr. Saad Giles   - Patient presented to the ED with tachycardia and dyspnea on 8/25  --> CT chest showing fluid around the pericardium   - Patient is with tachycardia (HR 100s) however no hypotension         Malignant Pleural Effusion   - Right loculated effusion appreciated on 8/25 CT chest  - Current O2 Requirements: RA  - Plan for Inpatient thoracentesis           Patient has recurrent pericardial effusion which appears to be large.  She also has significant right-sided pleural effusion and has significant shortness of breath now causing her to sit up.  She is planned to get thoracentesis later this morning.  I discussed the case with CT surgery and I believe it would be reasonable to proceed with redo pericardial window as without other functioning window, despite pericardiocentesis the fluid reaccumulation and pericardial will be back very likely as it appears to have malignant origin.  Also she may need evaluation by thoracic surgery team for consideration of Pleurx catheter      will continue to monitor her closely in heart center for time being.     8/27/2024  Hemodynamics are relatively stable.  1 L of fluid was removed from right lung yesterday with thoracentesis.  Will likely require pleuredex.  Going for pericardial window today by CT surgery  team.    8/28/2024  She underwent successful pericardial window placement yesterday.  Pericardial drain to be managed by CT surgery team and likely come out later today.  Continue current medications from cardiac standpoint.  She will be following with oncology for managing her cancer.  Will obtain a limited echocardiogram to reevaluate pericardial effusion.      8/29/2024  Chest tube to come out later today.  Repeat echocardiogram showed no residual pericardial effusion.  Continue current medications from cardiac standpoint.  She will need follow-up in cardiology clinic 2 weeks after discharge.  Okay to discharge from cardiac standpoint.      Thank you for the consult. We will sign off. Please contact cardiology consult service with any additional questions.       Sin Morillo MD, PhD, FAC, [facility]  Interventional Cardiology, [facility] [place]  Associate Professor of Medicine, [facility]  Office: [phone]

## 2024-08-29 NOTE — DISCHARGE INSTRUCTIONS
Follow up with primary care provider within 1 week after discharge.  Follow up with oncology within 2 weeks after discharge.  Continue home medications. Colchicine has been added for pain control. Furosemide has been added to decrease fluid accumulation.  You have a TTE (heart ultrasound) and chest x-ray scheduled for 09/12/2024.

## 2024-08-29 NOTE — PROGRESS NOTES
Cardiac Surgery  Progress Note     Ayesha Nova is a 54 y.o. female on day 4 of admission presenting with Chest pain, unspecified type.    Subjective     Interval HPI: Seen and assessed patient this AM while they were laying in bed. In no acute distress and with minimal complaints.     Review of Systems   Constitutional: Negative for decreased appetite and malaise/fatigue.   HENT:  Negative for congestion.    Eyes:  Negative for blurred vision and double vision.   Cardiovascular:  Negative for chest pain, dyspnea on exertion, irregular heartbeat, leg swelling, orthopnea and palpitations.   Respiratory:  Positive for cough. Negative for shortness of breath.    Endocrine: Negative for cold intolerance and heat intolerance.   Skin:  Negative for nail changes, poor wound healing and rash.   Musculoskeletal:  Negative for arthritis, muscle cramps, muscle weakness, myalgias and stiffness.   Gastrointestinal:  Negative for bloating, nausea and vomiting.   Genitourinary:  Negative for frequency, hesitancy and urgency.   Neurological:  Positive for weakness. Negative for dizziness, focal weakness and light-headedness.   Psychiatric/Behavioral:  Negative for altered mental status.    Allergic/Immunologic: Negative for environmental allergies.         Objective   Physical Exam  Physical Exam  Vitals and nursing note reviewed.   Constitutional:       General: She is not in acute distress.     Appearance: Normal appearance. She is not ill-appearing.   HENT:      Head: Normocephalic and atraumatic.      Nose: Nose normal.      Mouth/Throat:      Mouth: Mucous membranes are moist.      Pharynx: Oropharynx is clear.   Eyes:      Extraocular Movements: Extraocular movements intact.      Conjunctiva/sclera: Conjunctivae normal.      Pupils: Pupils are equal, round, and reactive to light.   Neck:      Vascular: Carotid bruit present.   Cardiovascular:      Rate and Rhythm: Normal rate and regular rhythm.      Pulses: Normal pulses.       Heart sounds: Normal heart sounds, S1 normal and S2 normal. No murmur heard.     No systolic murmur is present.      No friction rub. No gallop.   Pulmonary:      Effort: Pulmonary effort is normal. No tachypnea, bradypnea or accessory muscle usage.      Breath sounds: Examination of the right-lower field reveals decreased breath sounds. Examination of the left-lower field reveals decreased breath sounds. Decreased breath sounds present.   Chest:      Chest wall: No mass.      Comments: Mediastinal Chest Tube   Abdominal:      General: Abdomen is flat. Bowel sounds are normal. There is no distension.      Palpations: Abdomen is soft.   Musculoskeletal:         General: Normal range of motion.      Cervical back: Normal range of motion and neck supple.      Right lower leg: No edema.      Left lower leg: No edema.   Skin:     General: Skin is warm and dry.      Capillary Refill: Capillary refill takes less than 2 seconds.      Findings: No lesion.      Nails: There is no clubbing.      Comments: Subxyphoid incision with post-op dressing   Neurological:      General: No focal deficit present.      Mental Status: She is alert and oriented to person, place, and time. Mental status is at baseline.      Cranial Nerves: Cranial nerves 2-12 are intact.      Sensory: Sensation is intact.      Motor: Motor function is intact.   Psychiatric:         Attention and Perception: Attention and perception normal.         Mood and Affect: Mood normal.         Speech: Speech normal.         Behavior: Behavior normal.         Thought Content: Thought content normal.         Cognition and Memory: Cognition and memory normal.         Judgment: Judgment normal.         Last Recorded Vitals  Vitals:    08/29/24 0730 08/29/24 0745 08/29/24 0800 08/29/24 0815   BP:   98/57    BP Location:       Patient Position:       Pulse: 92 91 95 94   Resp: 19 20 (!) 27 24   Temp:   36.7 °C (98.1 °F)    TempSrc:   Temporal    SpO2: 96% 96% 96% 97%    Weight:       Height:            Intake/Output last 3 Shifts:  I/O last 3 completed shifts:  In: 1070 (16.8 mL/kg) [P.O.:820; Blood:250]  Out: 1965 (30.9 mL/kg) [Urine:1900 (0.8 mL/kg/hr); Chest Tube:65]  Weight: 63.5 kg     Inpatient Medications  cefTRIAXone, 1 g, intravenous, q24h  [Held by provider] heparin (porcine), 5,000 Units, subcutaneous, q8h  polyethylene glycol, 17 g, oral, Daily  sodium phosphates, 1 enema, rectal, Once      Continuous medications     PRN medications  PRN medications: acetaminophen **OR** acetaminophen **OR** acetaminophen, benzonatate, guaiFENesin, HYDROmorphone, ibuprofen, ipratropium-albuteroL, melatonin, ondansetron     LDA:       Relevant Results  Lab Review  Results from last 7 days   Lab Units 08/29/24  0537   WBC AUTO x10*3/uL 6.3   HEMOGLOBIN g/dL 10.5*   HEMATOCRIT % 32.2*   PLATELETS AUTO x10*3/uL 403     Results from last 7 days   Lab Units 08/29/24  0537 08/25/24  0601 08/25/24  0058   SODIUM mmol/L 138   < > 134*   POTASSIUM mmol/L 3.9   < > 4.0   CHLORIDE mmol/L 104   < > 99   CO2 mmol/L 28   < > 23   BUN mg/dL 14   < > 15   CREATININE mg/dL 0.67   < > 0.66   CALCIUM mg/dL 9.1   < > 9.9   PROTEIN TOTAL g/dL  --   --  7.2   BILIRUBIN TOTAL mg/dL  --   --  0.8   ALK PHOS U/L  --   --  45   ALT U/L  --   --  6*   AST U/L  --   --  15   GLUCOSE mg/dL 84   < > 107*    < > = values in this interval not displayed.     Results from last 7 days   Lab Units 08/29/24  0537   MAGNESIUM mg/dL 1.96     Results from last 7 days   Lab Units 08/27/24  1353   POCT PH, ARTERIAL pH 7.46*   POCT PCO2, ARTERIAL mm Hg 33*   POCT PO2, ARTERIAL mm Hg 454*   POCT HCO3 CALCULATED, ARTERIAL mmol/L 23.5   POCT BASE EXCESS, ARTERIAL mmol/L 0.0         Cardiology Tests     Echo:  Transthoracic Echo (TTE) Complete 08/26/2024  PHYSICIAN INTERPRETATION:  Left Ventricle: Left ventricular ejection fraction is normal, by visual estimate at 60-65%. There are no regional left ventricular wall motion  abnormalities. The left ventricular cavity size is normal. Spectral Doppler shows a normal pattern of left ventricular diastolic filling.  Left Atrium: The left atrium is normal in size.  Right Ventricle: The right ventricle is small in size. There is normal right ventricular global systolic function.  Right Atrium: The right atrium is small in size.  Aortic Valve: The aortic valve was not well visualized. There is no evidence of aortic valve regurgitation.  Mitral Valve: The mitral valve is normal in structure. There is trace mitral valve regurgitation.  Tricuspid Valve: The tricuspid valve is structurally normal. No evidence of tricuspid regurgitation.  Pulmonic Valve: The pulmonic valve is not well visualized. There is no indication of pulmonic valve regurgitation.  Pericardium: There is a large pericardial effusion.  Aorta: The aortic root is normal.        CONCLUSIONS:   1. Left ventricular ejection fraction is normal, by visual estimate at 60-65%.   2. There is normal right ventricular global systolic function.   3. There is a large pericardial effusion.   4. Echocardiographic findings are consistent with cardiac tamponade.    Transthoracic echo (TTE) limited 05/06/2024  PHYSICIAN INTERPRETATION:  Left Ventricle: The left ventricular systolic function is normal, with an estimated ejection fraction of 60%. There are no regional wall motion abnormalities. The left ventricular cavity size is normal. Spectral Doppler shows a normal pattern of left ventricular diastolic filling.  Left Atrium: The left atrium is normal in size.  Right Ventricle: The right ventricle is normal in size. There is normal right ventricular global systolic function.  Right Atrium: The right atrium is normal in size.  Aortic Valve: The aortic valve appears structurally normal. There is no evidence of aortic valve regurgitation. The peak instantaneous gradient of the aortic valve is 6.3 mmHg. The mean gradient of the aortic valve is 3.6  mmHg.  Mitral Valve: The mitral valve is normal in structure. There is no evidence of mitral valve regurgitation.  Tricuspid Valve: The tricuspid valve is structurally normal. No evidence of tricuspid regurgitation.  Pulmonic Valve: The pulmonic valve is structurally normal. There is no indication of pulmonic valve regurgitation.  Pericardium: There is a small pericardial effusion anterior to the right ventricle.  Aorta: The aortic root is normal.        CONCLUSIONS:   1. Left ventricular systolic function is normal with a 60% estimated ejection fraction.   2. Small pericardial effusion located anterior to the right ventricle.     Transthoracic Echo (TTE) Complete 05/02/2024  PHYSICIAN INTERPRETATION:  Left Ventricle: The left ventricular systolic function is normal, with an estimated ejection fraction of 55-60%. There are no regional wall motion abnormalities. The left ventricular cavity size is normal. Left ventricular diastolic filling was not assessed.  Left Atrium: The left atrium was not assessed.  Right Ventricle: The right ventricle was not assessed. Right ventricular systolic function not assessed.  Right Atrium: The right atrium was not assessed.  Aortic Valve: The aortic valve was not assessed. Aortic valve regurgitation was not assessed.  Mitral Valve: The mitral valve was not assessed. Mitral valve regurgitation was not assessed.  Tricuspid Valve: The tricuspid valve was not assessed. Tricuspid regurgitation was not assessed.  Pulmonic Valve: The pulmonic valve was not assessed. Pulmonic valve regurgitation was not assessed.  Pericardium: There is a large pericardial effusion. There is right ventricular diastolic collapse, is brief right atrial diastolic collapse and is inferior vena caval plethora. These findings are consistent with cardiac tamponade.  Aorta: The aortic root was not assessed.  Systemic Veins: The inferior vena cava appears mildly dilated.        CONCLUSIONS:   1. Left ventricular  systolic function is normal with a 55-60% estimated ejection fraction.   2. Echocardiographic findings are consistent with cardiac tamponade.   3. There is a large pericardial effusion.     Transthoracic echo (TTE) limited 04/15/2024  PHYSICIAN INTERPRETATION:  Left Ventricle: The left ventricular systolic function is normal, with an estimated ejection fraction of 65%. There are no regional wall motion abnormalities. The left ventricular cavity size is normal. Left ventricular diastolic filling was not assessed.  Left Atrium: The left atrium is normal in size.  Right Ventricle: The right ventricle is normal in size. There is normal right ventricular global systolic function.  Right Atrium: The right atrium is normal in size. Mild right atrial collapse.  Aortic Valve: The aortic valve appears structurally normal. There is no evidence of aortic valve regurgitation.  Mitral Valve: The mitral valve is normal in structure. There is no evidence of mitral valve regurgitation.  Tricuspid Valve: The tricuspid valve is structurally normal. There is trace tricuspid regurgitation. The Doppler estimated RVSP is within normal limits at 16.1 mmHg.  Pulmonic Valve: The pulmonic valve is not well visualized. The pulmonic valve regurgitation was not well visualized.  Pericardium: There is a large pericardial effusion.  Aorta: The aortic root is normal.        CONCLUSIONS:   1. Left ventricular systolic function is normal with a 65% estimated ejection fraction.   2. Mild right atrial collapse.   3. There is a large pericardial effusion.   4. RVSP within normal limits.   5. In comparisn to the previous study of 3/15/24, there has been an increase in size of the pericardial effusion.     Onco-Echo Complete (Strain And 3D) 03/15/2024  PHYSICIAN INTERPRETATION:  Left Ventricle: The left ventricular systolic function is hyperdynamic, with an estimated ejection fraction of 65-70%. There are no regional wall motion abnormalities. The left  ventricular cavity size is normal. Spectral Doppler shows a normal pattern of left ventricular diastolic filling.  Left Atrium: The left atrium is normal in size.  Right Ventricle: The right ventricle is normal in size. There is normal right ventricular global systolic function.  Right Atrium: The right atrium is normal in size.  Aortic Valve: The aortic valve appears structurally normal. There is no evidence of aortic valve regurgitation. The peak instantaneous gradient of the aortic valve is 3.8 mmHg. The mean gradient of the aortic valve is 2.2 mmHg.  Mitral Valve: The mitral valve is normal in structure. There is no evidence of mitral valve regurgitation.  Tricuspid Valve: The tricuspid valve is structurally normal. No evidence of tricuspid regurgitation.  Pulmonic Valve: The pulmonic valve is structurally normal. There is no indication of pulmonic valve regurgitation.  Pericardium: There is a moderately sized pericardial effusion. The effusion is circumferential.  Aorta: The aortic root is normal.  Systemic Veins: The inferior vena cava was not well visualized.     ONCO-CARDIOLOGY:  Vital Signs: The patients heart rate during the study was 75 beats per minute.  The patients blood pressure was 102/56 during the study.  Machine: This study was performed on the [employer] E-9.  Previous Study: The patient had a previous Onco-Cardiology echocardiogram dated  5/31/2023. The patient's previous study was performed on the [employer].        Onco-Cardiology Measurements:  Historical Measurements from Previous Study  2D EF (Biplane)                     63%  Global Longitudinal Strain (GLS) -22.3%     Current Measurements  2D EF (Biplane)                     64%  Global Longitudinal Strain (GLS) -20.6%     GLS Tracking Quality: Fair        CONCLUSIONS:   1. Left ventricular systolic function is hyperdynamic with a 65-70% estimated ejection fraction.   2. There is a moderate pericardial effusion.   3. The pericardial effusion is new  in comparison to the study of 5/31/23.   4. Global longitudinal strain is normal at -20%.   5. No hemodynamic evidence of pericardial tamponade.         History of Present Illness: Ayesha Nova is a 54 y.o. female with a PMH significant for longstanding recurrent metastatic breast cancer (initial dx 2016 - ductal carcinoma [G3, ER +, HER2/della amplified] with metastatic lesions to bilateral lungs - has declined treatment), LLL Lung Nodule (S/p surgical resection in 2018), Malignant Pericardial Effusion (S/p pericardial window 5/2/24) who presents to [facility] on 8/25/2024 with complaints of chest discomfort and tachycardia.     ED Course: Patient vitals were notable for hypotension in 85-95 systolic range and tachycardia. Labs were unremarkable and troponin was flat. EKG was notable for sinus tachycardia with low voltage. CT angio of the chest showed an increase in pericardial effusion. Loculated right pleural effusion was also present, as well as central cavitations in the right lung apex that were suggestive of either pneumonia or a metastatic lesion. No pulmonary emboli were noted. The patient was given a 500mL NS bolus, followed by another 1L bolus. She was also given 0.2mg dilaudid and Zofran for pain management and nausea.      Given the patients CT imaging revealing a Right pleural effusion and a pericardial effusion, cardiac surgery was consulted for evaluation & management.      Daily Events    8/26/24: No acute events ovenright; patient asymptomatic at rest. Plan for R thoracentesis today. TTE observed this AM showing a moderately large anterior pericardial effusion. Plan for OR tomorrow afternoon for a pericardial window creation.      8/27/24: Seen s/p pericardial window with Dr. Giles. Uneventful procedure. On facemask, in bed. Continue multimodal pain management per primary team.     8/28/24: No acute events overnight; patient with softer BP's this AM, giving albumin.  Otherwise, chest tube with low-moderate output, plan to maintain today.     - Current O2 Requirements: 2L NC     8/29/24: Continues with no acute events overnight. Chest tube drainage slowed and subsequently removed.    Assessment & Plan      Malignant Pericardial Effusion   - Secondary to underlying metastatic breast cancer   - S/p Urgent Pericardial Window on 5/2/24 with Cardiac Surgeon, Dr. Saad Giles   - Patient presented to the ED with tachycardia and dyspnea on 8/25  --> CT chest showing fluid around the pericardium   - TTE on 8/26 showing recurrent of pericardial fluid with a moderate sized effusion   - Now s/p redo pericardial window on 8/27 with Dr. Giles  --> Chest tube removed this morning  - Starting on colchicine 0.6mg  --> continue for 30 days         Malignant Pleural Effusion   - Right loculated effusion appreciated on 8/25 CT chest  - Current O2 Requirements: RA  - CXR showing small effusion   - Home with PO furosemide 20mg every day     Above patient and plan discussed with cardiac surgeon, Dr. Giles. Plan as above. From a cardiac surgery standpoint, the patient is okay for discharge once medically cleared.     Arranging for close follow up with a repeat CXR and limited TTE.     Jason Ellison, LEANDER-CNP

## 2024-08-29 NOTE — HOSPITAL COURSE
Patient is a 54 y.o. female with PMHx of metastatic breast cancer, cardiac tamponade s/p pericardial window 05/2024, and hypothyroidism who presented to the ED on 8/25 for chest pain and tachycardia. She reported chest pain that never fully resolved since her pericardial window procedure in May. In the ED, EKG was notable for low voltage consistent with pericardial effusion. CT imaging showed an opacity in the right lung that was suggestive for pneumonia/metastatic lesion. A worsening pleural effusion was also found when compared to previous imaging. She received 1.5L of NS to improve her hypotension. She was started on 400mg ibuprofen for pain, rocephin/azithromycin for suspected pneumonia, duonebs, and admitted to the heart center.     Cardiology and cardiothoracic surgery were consulted and recommended redoing the pericardial window. Pulmonary was consulted and performed a thoracentesis, which yield exudative fluid suggestive of malignant etiology. The patient was monitored and given a 500mL bolus of LR followed by continuous infusion to keep her pressures increased. On 8/27 the patient underwent a pericardial window procedure, and repeat TTE showed resolution of the pericardial effusion. A pericardial catheter was placed to continue draining the fluid. Her clinical presentation and pain improved post-procedure. Her catheter was removed on 8/29 and she was cleared from a cardiology standpoint. There was a discussion regarding the etiology of her recurrent effusions and pursuing management of her cancer. She was agreeable to this plan and discharged home on 8/29 with colchicine and furosemide.

## 2024-08-30 LAB
BLOOD EXPIRATION DATE: NORMAL
BLOOD EXPIRATION DATE: NORMAL
DISPENSE STATUS: NORMAL
DISPENSE STATUS: NORMAL
FUNGUS SPEC CULT: NORMAL
FUNGUS SPEC FUNGUS STN: NORMAL
PRODUCT BLOOD TYPE: 600
PRODUCT BLOOD TYPE: 600
PRODUCT CODE: NORMAL
PRODUCT CODE: NORMAL
UNIT ABO: NORMAL
UNIT ABO: NORMAL
UNIT NUMBER: NORMAL
UNIT NUMBER: NORMAL
UNIT RH: NORMAL
UNIT RH: NORMAL
UNIT VOLUME: 279
UNIT VOLUME: 292
XM INTEP: NORMAL
XM INTEP: NORMAL

## 2024-08-31 LAB
ATRIAL RATE: 109 BPM
ATRIAL RATE: 110 BPM
ATRIAL RATE: 120 BPM
DIASTOLIC BLOOD PRESSURE: 58 MMHG
P AXIS: 40 DEGREES
P AXIS: 45 DEGREES
P AXIS: 52 DEGREES
P OFFSET: 205 MS
P OFFSET: 206 MS
P ONSET: 158 MS
P ONSET: 160 MS
PR INTERVAL: 123 MS
PR INTERVAL: 126 MS
PR INTERVAL: 128 MS
Q ONSET: 222 MS
Q ONSET: 223 MS
Q ONSET: 254 MS
QRS COUNT: 17 BEATS
QRS COUNT: 18 BEATS
QRS COUNT: 19 BEATS
QRS DURATION: 60 MS
QRS DURATION: 64 MS
QRS DURATION: 66 MS
QT INTERVAL: 304 MS
QT INTERVAL: 324 MS
QT INTERVAL: 324 MS
QTC CALCULATION(BAZETT): 429 MS
QTC CALCULATION(BAZETT): 437 MS
QTC CALCULATION(BAZETT): 438 MS
QTC FREDERICIA: 383 MS
QTC FREDERICIA: 395 MS
QTC FREDERICIA: 396 MS
R AXIS: -10 DEGREES
R AXIS: -32 DEGREES
R AXIS: -36 DEGREES
SYSTOLIC BLOOD PRESSURE: 93 MMHG
T AXIS: 56 DEGREES
T AXIS: 60 DEGREES
T AXIS: 70 DEGREES
T OFFSET: 375 MS
T OFFSET: 384 MS
T OFFSET: 416 MS
VENTRICULAR RATE: 109 BPM
VENTRICULAR RATE: 110 BPM
VENTRICULAR RATE: 120 BPM

## 2024-09-03 ENCOUNTER — HOSPITAL ENCOUNTER (OUTPATIENT)
Dept: CARDIOLOGY | Facility: HOSPITAL | Age: 54
Discharge: HOME | End: 2024-09-03
Payer: COMMERCIAL

## 2024-09-03 ENCOUNTER — APPOINTMENT (OUTPATIENT)
Dept: RADIOLOGY | Facility: HOSPITAL | Age: 54
End: 2024-09-03
Payer: COMMERCIAL

## 2024-09-03 ENCOUNTER — HOSPITAL ENCOUNTER (EMERGENCY)
Facility: HOSPITAL | Age: 54
Discharge: HOME | End: 2024-09-03
Attending: EMERGENCY MEDICINE
Payer: COMMERCIAL

## 2024-09-03 ENCOUNTER — APPOINTMENT (OUTPATIENT)
Dept: CARDIOLOGY | Facility: HOSPITAL | Age: 54
End: 2024-09-03
Payer: COMMERCIAL

## 2024-09-03 VITALS
WEIGHT: 130 LBS | TEMPERATURE: 98.2 F | SYSTOLIC BLOOD PRESSURE: 91 MMHG | DIASTOLIC BLOOD PRESSURE: 53 MMHG | BODY MASS INDEX: 23.04 KG/M2 | HEART RATE: 85 BPM | RESPIRATION RATE: 18 BRPM | HEIGHT: 63 IN | OXYGEN SATURATION: 97 %

## 2024-09-03 DIAGNOSIS — R11.0 NAUSEA: ICD-10-CM

## 2024-09-03 DIAGNOSIS — R07.9 CHEST PAIN, UNSPECIFIED TYPE: Primary | ICD-10-CM

## 2024-09-03 LAB
ALBUMIN SERPL BCP-MCNC: 3.7 G/DL (ref 3.4–5)
ALP SERPL-CCNC: 51 U/L (ref 33–110)
ALT SERPL W P-5'-P-CCNC: 10 U/L (ref 7–45)
ANION GAP SERPL CALC-SCNC: 14 MMOL/L (ref 10–20)
AST SERPL W P-5'-P-CCNC: 12 U/L (ref 9–39)
BASOPHILS # BLD AUTO: 0.03 X10*3/UL (ref 0–0.1)
BASOPHILS NFR BLD AUTO: 0.3 %
BILIRUB SERPL-MCNC: 0.5 MG/DL (ref 0–1.2)
BUN SERPL-MCNC: 2 MG/DL (ref 6–23)
CALCIUM SERPL-MCNC: 9.5 MG/DL (ref 8.6–10.3)
CARDIAC TROPONIN I PNL SERPL HS: <3 NG/L (ref 0–13)
CARDIAC TROPONIN I PNL SERPL HS: <3 NG/L (ref 0–13)
CHLORIDE SERPL-SCNC: 101 MMOL/L (ref 98–107)
CO2 SERPL-SCNC: 26 MMOL/L (ref 21–32)
CREAT SERPL-MCNC: 0.67 MG/DL (ref 0.5–1.05)
EGFRCR SERPLBLD CKD-EPI 2021: >90 ML/MIN/1.73M*2
EOSINOPHIL # BLD AUTO: 0.08 X10*3/UL (ref 0–0.7)
EOSINOPHIL NFR BLD AUTO: 0.7 %
ERYTHROCYTE [DISTWIDTH] IN BLOOD BY AUTOMATED COUNT: 14.2 % (ref 11.5–14.5)
FUNGUS SPEC CULT: NORMAL
FUNGUS SPEC FUNGUS STN: NORMAL
GLUCOSE SERPL-MCNC: 104 MG/DL (ref 74–99)
HCT VFR BLD AUTO: 36.7 % (ref 36–46)
HGB BLD-MCNC: 11.7 G/DL (ref 12–16)
IMM GRANULOCYTES # BLD AUTO: 0.04 X10*3/UL (ref 0–0.7)
IMM GRANULOCYTES NFR BLD AUTO: 0.4 % (ref 0–0.9)
LAB AP ASR DISCLAIMER: NORMAL
LABORATORY COMMENT REPORT: NORMAL
LYMPHOCYTES # BLD AUTO: 1.26 X10*3/UL (ref 1.2–4.8)
LYMPHOCYTES NFR BLD AUTO: 11.7 %
MAGNESIUM SERPL-MCNC: 2.18 MG/DL (ref 1.6–2.4)
MCH RBC QN AUTO: 27.3 PG (ref 26–34)
MCHC RBC AUTO-ENTMCNC: 31.9 G/DL (ref 32–36)
MCV RBC AUTO: 86 FL (ref 80–100)
MONOCYTES # BLD AUTO: 0.65 X10*3/UL (ref 0.1–1)
MONOCYTES NFR BLD AUTO: 6 %
NEUTROPHILS # BLD AUTO: 8.71 X10*3/UL (ref 1.2–7.7)
NEUTROPHILS NFR BLD AUTO: 80.9 %
NRBC BLD-RTO: 0 /100 WBCS (ref 0–0)
PATH REPORT.FINAL DX SPEC: NORMAL
PATH REPORT.FINAL DX SPEC: NORMAL
PATH REPORT.GROSS SPEC: NORMAL
PATH REPORT.GROSS SPEC: NORMAL
PATH REPORT.RELEVANT HX SPEC: NORMAL
PATH REPORT.RELEVANT HX SPEC: NORMAL
PATH REPORT.TOTAL CANCER: NORMAL
PATH REPORT.TOTAL CANCER: NORMAL
PLATELET # BLD AUTO: 517 X10*3/UL (ref 150–450)
POTASSIUM SERPL-SCNC: 4.2 MMOL/L (ref 3.5–5.3)
PROT SERPL-MCNC: 7.3 G/DL (ref 6.4–8.2)
RBC # BLD AUTO: 4.28 X10*6/UL (ref 4–5.2)
SODIUM SERPL-SCNC: 137 MMOL/L (ref 136–145)
WBC # BLD AUTO: 10.8 X10*3/UL (ref 4.4–11.3)

## 2024-09-03 PROCEDURE — 2500000004 HC RX 250 GENERAL PHARMACY W/ HCPCS (ALT 636 FOR OP/ED)

## 2024-09-03 PROCEDURE — 2500000004 HC RX 250 GENERAL PHARMACY W/ HCPCS (ALT 636 FOR OP/ED): Performed by: EMERGENCY MEDICINE

## 2024-09-03 PROCEDURE — 96374 THER/PROPH/DIAG INJ IV PUSH: CPT

## 2024-09-03 PROCEDURE — 36415 COLL VENOUS BLD VENIPUNCTURE: CPT

## 2024-09-03 PROCEDURE — 93005 ELECTROCARDIOGRAM TRACING: CPT

## 2024-09-03 PROCEDURE — 96361 HYDRATE IV INFUSION ADD-ON: CPT

## 2024-09-03 PROCEDURE — 96375 TX/PRO/DX INJ NEW DRUG ADDON: CPT

## 2024-09-03 PROCEDURE — 85025 COMPLETE CBC W/AUTO DIFF WBC: CPT

## 2024-09-03 PROCEDURE — 99285 EMERGENCY DEPT VISIT HI MDM: CPT | Mod: 25

## 2024-09-03 PROCEDURE — 2500000001 HC RX 250 WO HCPCS SELF ADMINISTERED DRUGS (ALT 637 FOR MEDICARE OP)

## 2024-09-03 PROCEDURE — 80053 COMPREHEN METABOLIC PANEL: CPT

## 2024-09-03 PROCEDURE — 71045 X-RAY EXAM CHEST 1 VIEW: CPT

## 2024-09-03 PROCEDURE — 71045 X-RAY EXAM CHEST 1 VIEW: CPT | Performed by: SURGERY

## 2024-09-03 PROCEDURE — 99284 EMERGENCY DEPT VISIT MOD MDM: CPT | Mod: 25

## 2024-09-03 PROCEDURE — 84484 ASSAY OF TROPONIN QUANT: CPT

## 2024-09-03 PROCEDURE — 83735 ASSAY OF MAGNESIUM: CPT

## 2024-09-03 RX ORDER — KETOROLAC TROMETHAMINE 30 MG/ML
15 INJECTION, SOLUTION INTRAMUSCULAR; INTRAVENOUS ONCE
Status: COMPLETED | OUTPATIENT
Start: 2024-09-03 | End: 2024-09-03

## 2024-09-03 RX ORDER — TRAMADOL HYDROCHLORIDE 50 MG/1
50 TABLET ORAL ONCE
Status: COMPLETED | OUTPATIENT
Start: 2024-09-03 | End: 2024-09-03

## 2024-09-03 RX ORDER — ONDANSETRON 4 MG/1
4 TABLET, ORALLY DISINTEGRATING ORAL EVERY 8 HOURS PRN
Qty: 20 TABLET | Refills: 0 | Status: SHIPPED | OUTPATIENT
Start: 2024-09-03 | End: 2024-09-10

## 2024-09-03 RX ORDER — TRAMADOL HYDROCHLORIDE 50 MG/1
50 TABLET ORAL EVERY 6 HOURS PRN
Qty: 15 TABLET | Refills: 0 | Status: SHIPPED | OUTPATIENT
Start: 2024-09-03 | End: 2024-09-07

## 2024-09-03 RX ORDER — ONDANSETRON HYDROCHLORIDE 2 MG/ML
INJECTION, SOLUTION INTRAVENOUS
Status: COMPLETED
Start: 2024-09-03 | End: 2024-09-03

## 2024-09-03 RX ORDER — ONDANSETRON HYDROCHLORIDE 2 MG/ML
4 INJECTION, SOLUTION INTRAVENOUS ONCE
Status: COMPLETED | OUTPATIENT
Start: 2024-09-03 | End: 2024-09-03

## 2024-09-03 ASSESSMENT — COLUMBIA-SUICIDE SEVERITY RATING SCALE - C-SSRS
1. IN THE PAST MONTH, HAVE YOU WISHED YOU WERE DEAD OR WISHED YOU COULD GO TO SLEEP AND NOT WAKE UP?: NO
2. HAVE YOU ACTUALLY HAD ANY THOUGHTS OF KILLING YOURSELF?: NO
6. HAVE YOU EVER DONE ANYTHING, STARTED TO DO ANYTHING, OR PREPARED TO DO ANYTHING TO END YOUR LIFE?: NO

## 2024-09-03 ASSESSMENT — PAIN - FUNCTIONAL ASSESSMENT: PAIN_FUNCTIONAL_ASSESSMENT: 0-10

## 2024-09-03 ASSESSMENT — PAIN SCALES - GENERAL: PAINLEVEL_OUTOF10: 9

## 2024-09-03 NOTE — DISCHARGE INSTRUCTIONS
Please be sure to follow-up with your oncologist as soon as you are able to.  We have given you a prescription for tramadol to help with your pain at home.  Please take this for severe pain only.  Follow-up with your primary care provider in the next few days.  Your work-up today did not reveal any acute, emergent findings requiring intervention that would be a cause of your symptoms.     If you have a return or worsening of symptoms including worsening chest pain, shortness of breath, lightheadedness, dizziness, sweating, chest pain at rest, worsening exercise tolerance, or any new or concerning symptoms please seek immediate medical attention or come back to the ER.

## 2024-09-03 NOTE — ED PROVIDER NOTES
EMERGENCY DEPARTMENT ENCOUNTER      Pt Name: Ayesha Nova  MRN: [id number]  Birthdate 1970  Date of evaluation: 9/3/2024  Provider: Lawrence Solomon DO    CHIEF COMPLAINT       Chief Complaint   Patient presents with    Chest Pain         HISTORY OF PRESENT ILLNESS    Patient is a 54-year-old female with past medical history significant for breast cancer with metastasis, complicated recently by recurrent pericardial effusion and right-sided pleural effusion both on be malignant.  She is now status post pericardial window during her most recent hospitalization.  She was discharged on 8/29/2024 with instruction to follow-up with oncology.  Patient is coming in today with complaint of chest pain, this is not any different in quality compared to what she is usually experience, but she is unable to manage this at home.  She has been taking Motrin for this, without any significant relief.  She states that during her last hospitalization Toradol was effective at managing her pain.  Denies any shortness of breath.  No fevers or chills.  Her surgical incisions are well-healing.  She is not complaining of any complications there.          Nursing Notes were reviewed.    PAST MEDICAL HISTORY     Past Medical History:   Diagnosis Date    Personal history of malignant neoplasm of breast     History of malignant neoplasm of breast         SURGICAL HISTORY       Past Surgical History:   Procedure Laterality Date    HYSTERECTOMY  02/29/2016    Hysterectomy    OTHER SURGICAL HISTORY  04/20/2022    Breast reduction    OTHER SURGICAL HISTORY  04/20/2022    Lumpectomy    OTHER SURGICAL HISTORY  04/20/2022    Lung wedge resection         CURRENT MEDICATIONS       Discharge Medication List as of 9/3/2024  6:20 AM        CONTINUE these medications which have NOT CHANGED    Details   colchicine 0.6 mg tablet Take 1 tablet (0.6 mg) by mouth once daily., Starting Thu 8/29/2024, Until Sat 9/28/2024, Normal      furosemide (Lasix) 20 mg tablet  Take 1 tablet (20 mg) by mouth once daily., Starting Thu 2024, Until Sat 2024, Normal      hydrocortisone-pramoxine (Analpram-HC) 1-1 % rectal cream Insert 1 Application into the rectum 2 times a day. to affected area, Starting Wed 5/15/2024, Historical Med             ALLERGIES     Vancomycin, Morphine, and Sulfa (sulfonamide antibiotics)    FAMILY HISTORY       Family History   Problem Relation Name Age of Onset    Dementia Mother      Heart attack Father      Other (cabg) Father            SOCIAL HISTORY       Social History     Socioeconomic History    Marital status:    Tobacco Use    Smoking status: Former     Current packs/day: 0.00     Types: Cigarettes     Quit date:      Years since quittin.6    Smokeless tobacco: Never   Substance and Sexual Activity    Alcohol use: Never    Drug use: Never     Social Determinants of Health     Financial Resource Strain: Low Risk  (2024)    Overall Financial Resource Strain (CARDIA)     Difficulty of Paying Living Expenses: Not hard at all   Food Insecurity: No Food Insecurity (2024)    Hunger Vital Sign     Worried About Running Out of Food in the Last Year: Never true     Ran Out of Food in the Last Year: Never true   Transportation Needs: No Transportation Needs (2024)    PRAPARE - Transportation     Lack of Transportation (Medical): No     Lack of Transportation (Non-Medical): No   Housing Stability: Low Risk  (2024)    Housing Stability Vital Sign     Unable to Pay for Housing in the Last Year: No     Number of Times Moved in the Last Year: 1     Homeless in the Last Year: No       SCREENINGS                        PHYSICAL EXAM    (up to 7 for level 4, 8 or more for level 5)     ED Triage Vitals [24 0322]   Temperature Heart Rate Respirations BP   36.8 °C (98.2 °F) 100 18 118/72      Pulse Ox Temp src Heart Rate Source Patient Position   98 % -- -- --      BP Location FiO2 (%)     -- --       Physical Exam  Vitals  and nursing note reviewed.   Constitutional:       General: She is not in acute distress.     Appearance: Normal appearance. She is not ill-appearing or toxic-appearing.   HENT:      Head: Normocephalic and atraumatic.      Right Ear: External ear normal.      Left Ear: External ear normal.      Nose: Nose normal.   Eyes:      General:         Right eye: No discharge.         Left eye: No discharge.      Extraocular Movements: Extraocular movements intact.      Conjunctiva/sclera: Conjunctivae normal.      Pupils: Pupils are equal, round, and reactive to light.   Cardiovascular:      Rate and Rhythm: Normal rate and regular rhythm.      Pulses: Normal pulses.      Heart sounds: Normal heart sounds. Heart sounds not distant. No murmur heard.  Pulmonary:      Effort: Pulmonary effort is normal. No tachypnea.      Breath sounds: Normal breath sounds. No decreased breath sounds or wheezing.   Abdominal:      General: There is no distension.      Palpations: Abdomen is soft. There is no mass.      Tenderness: There is no abdominal tenderness. There is no guarding.   Musculoskeletal:         General: No deformity or signs of injury.   Skin:     General: Skin is warm and dry.      Comments: Well-healing surgical incision on the anterior abdomen.  No surrounding erythema, warmth, induration, drainage.   Neurological:      General: No focal deficit present.      Mental Status: She is alert and oriented to person, place, and time. Mental status is at baseline.   Psychiatric:         Mood and Affect: Mood normal.         Behavior: Behavior normal.          DIAGNOSTIC RESULTS     LABS:  Labs Reviewed   CBC WITH AUTO DIFFERENTIAL - Abnormal       Result Value    WBC 10.8      nRBC 0.0      RBC 4.28      Hemoglobin 11.7 (*)     Hematocrit 36.7      MCV 86      MCH 27.3      MCHC 31.9 (*)     RDW 14.2      Platelets 517 (*)     Neutrophils % 80.9      Immature Granulocytes %, Automated 0.4      Lymphocytes % 11.7      Monocytes %  6.0      Eosinophils % 0.7      Basophils % 0.3      Neutrophils Absolute 8.71 (*)     Immature Granulocytes Absolute, Automated 0.04      Lymphocytes Absolute 1.26      Monocytes Absolute 0.65      Eosinophils Absolute 0.08      Basophils Absolute 0.03     COMPREHENSIVE METABOLIC PANEL - Abnormal    Glucose 104 (*)     Sodium 137      Potassium 4.2      Chloride 101      Bicarbonate 26      Anion Gap 14      Urea Nitrogen 2 (*)     Creatinine 0.67      eGFR >90      Calcium 9.5      Albumin 3.7      Alkaline Phosphatase 51      Total Protein 7.3      AST 12      Bilirubin, Total 0.5      ALT 10     MAGNESIUM - Normal    Magnesium 2.18     SERIAL TROPONIN-INITIAL - Normal    Troponin I, High Sensitivity <3      Narrative:     Less than 99th percentile of normal range cutoff-  Female and children under 18 years old <14 ng/L; Male <21 ng/L: Negative  Repeat testing should be performed if clinically indicated.     Female and children under 18 years old 14-50 ng/L; Male 21-50 ng/L:  Consistent with possible cardiac damage and possible increased clinical   risk. Serial measurements may help to assess extent of myocardial damage.     >50 ng/L: Consistent with cardiac damage, increased clinical risk and  myocardial infarction. Serial measurements may help assess extent of   myocardial damage.      NOTE: Children less than 1 year old may have higher baseline troponin   levels and results should be interpreted in conjunction with the overall   clinical context.     NOTE: Troponin I testing is performed using a different   testing methodology at [facility] than at other   [facility] hospitals. Direct result comparisons should only   be made within the same method.   SERIAL TROPONIN, 1 HOUR - Normal    Troponin I, High Sensitivity <3      Narrative:     Less than 99th percentile of normal range cutoff-  Female and children under 18 years old <14 ng/L; Male <21 ng/L: Negative  Repeat testing should be performed if  clinically indicated.     Female and children under 18 years old 14-50 ng/L; Male 21-50 ng/L:  Consistent with possible cardiac damage and possible increased clinical   risk. Serial measurements may help to assess extent of myocardial damage.     >50 ng/L: Consistent with cardiac damage, increased clinical risk and  myocardial infarction. Serial measurements may help assess extent of   myocardial damage.      NOTE: Children less than 1 year old may have higher baseline troponin   levels and results should be interpreted in conjunction with the overall   clinical context.     NOTE: Troponin I testing is performed using a different   testing methodology at [facility] than at other   [facility]. Direct result comparisons should only   be made within the same method.   TROPONIN SERIES- (INITIAL, 1 HR)    Narrative:     The following orders were created for panel order Troponin I Series, High Sensitivity (0, 1 HR).  Procedure                               Abnormality         Status                     ---------                               -----------         ------                     Troponin I, High Sensiti...[[id number]]  Normal              Final result               Troponin, High Sensitivi...[[id number]]  Normal              Final result                 Please view results for these tests on the individual orders.       All other labs were within normal range or not returned as of this dictation.    Imaging  XR chest 1 view   Final Result   1.   Similar appearance of asymmetric right parenchymal opacity   predominating in the perihilar lung corresponding to patient's known   malignancy. Similar appearance of moderate sized loculated right   pleural effusion. Similar nodular densities in the left lower lung   compatible with metastatic disease. No definite new parenchymal   abnormality. No sizable pleural effusion on the left. No pneumothorax.                  MACRO:   None        Signed by:  Jairo Olvera 9/3/2024 4:28 AM   Dictation workstation:   [id number]           Procedures  Procedures     EMERGENCY DEPARTMENT COURSE/MDM:   Medical Decision Making  54-year female past ministry as above significant for metastatic breast cancer with recurrent pleural and pericardial effusions.  Recent hospitalization, discharged on 829 at which time she had a repeat pericardial window performed.  Presents today with chief complaint of chest pain, no significant change in quality of her pain, she is just unable to manage her symptoms at home with Motrin.  States that Toradol has been effective at managing her pain while she is in the hospital but is requesting a dose of this.  Otherwise denies any shortness of breath.  Vital signs are overall unremarkable except for a very mild tachycardia 100.  Bedside ultrasound was performed, there is no obvious effusion noted on this exam.  Given her history, we will obtain usual cardiac workup.  Differential clues not limited to her pleural effusion, recurrent pericardial effusion, ACS.  We will proceed with usual chest pain work-up including EKG, chest x-ray, CBC, CMP, serial troponin, BNP to rule out acute coronary syndrome.  Patient given a dose of Toradol for pain management while in the ER.        Please see ED course for additional MDM.    ED Course as of 09/05/24 1230   Tue Sep 03, 2024   0340 Bedside ultrasound performed, no pericardial effusion appreciated. [CL]   0511 Patient's workup is overall unremarkable.  She was given a dose of Toradol as well as tramadol.  This did an very active job at managing her symptoms today.  She would like to be discharged, this is reasonable given her overall benign workup.  She was given a prescription for tramadol to be taken at home for severe pain.  Otherwise importance of following up with oncology was emphasized to her.  She is in agreement with plan for discharge.  Return precautions were discussed.  She was discharged in stable  condition. [CL]   0540 Patient reevaluated prior to discharge, she did have what does appear to be a vagal episode, she is given IV fluids. [CL]   0614 Patient was reevaluated at bedside, she is feeling much better, was given a prescription for Zofran.  She is overall in agreement with plan for discharge at this time. [CL]      ED Course User Index  [CL] Lawrence Solomon,          Diagnoses as of 09/05/24 1230   Chest pain, unspecified type   Nausea        XR chest 1 view   Final Result   1.   Similar appearance of asymmetric right parenchymal opacity   predominating in the perihilar lung corresponding to patient's known   malignancy. Similar appearance of moderate sized loculated right   pleural effusion. Similar nodular densities in the left lower lung   compatible with metastatic disease. No definite new parenchymal   abnormality. No sizable pleural effusion on the left. No pneumothorax.                  MACRO:   None        Signed by: Jairo Olvera 9/3/2024 4:28 AM   Dictation workstation:   [id number]          Patient and or family in agreement and understanding of treatment plan.  All questions answered.      I reviewed the case with the attending ED physician. The attending ED physician agrees with the plan. Patient and/or patient´s representative was counseled regarding labs, imaging, likely diagnosis, and plan. All questions were answered.    ED Medications administered this visit:    Medications   ketorolac (Toradol) injection 15 mg (15 mg intravenous Given 9/3/24 0333)   traMADol (Ultram) tablet 50 mg (50 mg oral Given 9/3/24 7195)   ondansetron (Zofran) injection 4 mg (4 mg intravenous Given 9/3/24 0522)   sodium chloride 0.9 % bolus 1,000 mL (0 mL intravenous Stopped 9/3/24 0637)       New Prescriptions from this visit:    Discharge Medication List as of 9/3/2024  6:20 AM        START taking these medications    Details   ondansetron ODT (Zofran-ODT) 4 mg disintegrating tablet Take 1 tablet (4 mg) by mouth  every 8 hours if needed for nausea or vomiting for up to 7 days., Starting Tue 9/3/2024, Until Tue 9/10/2024 at 2359, Normal      traMADol (Ultram) 50 mg tablet Take 1 tablet (50 mg) by mouth every 6 hours if needed for severe pain (7 - 10) for up to 4 days., Starting Tue 9/3/2024, Until Sat 9/7/2024 at 2359, Normal             Follow-up:  Karina Nicole, APRN-CNP  [address] [facility] [address] [phone]    Schedule an appointment as soon as possible for a visit       Ruy Carolina MD  [address] [facility] [address] [phone]    Schedule an appointment as soon as possible for a visit           Final Impression:   1. Chest pain, unspecified type    2. Nausea          (Please note that portions of this note were completed with a voice recognition program.  Efforts were made to edit the dictations but occasionally words are mis-transcribed.)     Lawrence Solomon DO  Resident  09/03/24 0511       Lawrence Solomon DO  Resident  09/05/24 1230

## 2024-09-04 LAB
ACID FAST STN SPEC: NORMAL
MYCOBACTERIUM SPEC CULT: NORMAL

## 2024-09-04 NOTE — DOCUMENTATION CLARIFICATION NOTE
"    PATIENT:               MINNIE OLIVER  ACCT #:                  [id number]  MRN:                       [id number]  :                       1970  ADMIT DATE:       2024 12:01 AM  DISCH DATE:        2024 3:43 PM  RESPONDING PROVIDER #:        [id number]          PROVIDER RESPONSE TEXT:    Malignant pleural effusion and pericardial effusion related to breast cancer.    CDI QUERY TEXT:    Clarification        Instruction:    Based on your assessment of the patient and the clinical information, please provide the requested documentation by clicking on the appropriate radio button and enter any additional information if prompted.    Question: Please clarify if a relationship exists between  malignant pleural effusion and pericardial effusion is breast cancer or lung cancer.    When answering this query, please exercise your independent professional judgment. The fact that a question is being asked, does not imply that any particular answer is desired or expected.    The patient's clinical indicators include:  Clinical Information: 24 H/P Dr. Vergara \" 54 y.o. female with a history of metastatic breast cancer, cardiac tamponade s/p pericardial window, and hypothyroidism who presented to the ED on  for chest pain that started a few hours prior to her arrival. \" Diagnosis: Recurrent pericardial effusion likely 2/2 metastatic breast cancer. Right lung opacity, suspected pneumonia vs. metastatic lesion. Right lobar pulmonary artery encasement 2/2 R upper lobe and hilar mass. Worsening loculated moderate right pleural effusion.    Clinical Indicators:  1. 24 Final Cytological findings \" PLEURAL FLUID RIGHT SIDE, 1 THIN PREP AND CELL BLOCK:  Positive for malignant cells derived from adenocarcinoma consistent with origin from breast primary. \"  2. 24 Final Cytological \"PERICARDIAL FLUID, CYTOLOGY AND CELL BLOCK: No malignant cells identified. \"  3. Discharge summary \"Malignant pericardial effusion. " "Right sided loculated pleural effusion.  4. XR Chest 8/29/24 impression \"Mass like consolidation right upper lobe. Volume loss right lung. Decrease in size of a small loculated right pleural effusion. \"    Treatment:  Rocephin 1g  IVPB x 5 days. Azithromycin 500mg IVPB x 2 days.. 8/26/24 Thoracentesis    Risk Factors: History of metastatic breast cancer. H/P  Recurrent pericardial effusion likely 2/2 metastatic breast cancer.  Options provided:  -- Malignant pleural effusion and pericardial effusion related to breast cancer.  -- Malignant pleural effusion and pericardial effusion related to lung cancer.  -- Other - I will add my own diagnosis  -- Refer to Clinical Documentation Reviewer    Query created by: Kobi Archer on 9/4/2024 2:00 PM      Electronically signed by:  IAN SCHNEIDER MD 9/4/2024 2:02 PM          "

## 2024-09-05 ENCOUNTER — TELEPHONE (OUTPATIENT)
Dept: CARDIOVASCULAR ICU | Facility: HOSPITAL | Age: 54
End: 2024-09-05

## 2024-09-05 LAB
ATRIAL RATE: 100 BPM
P AXIS: 46 DEGREES
PR INTERVAL: 121 MS
Q ONSET: 254 MS
QRS COUNT: 16 BEATS
QRS DURATION: 69 MS
QT INTERVAL: 359 MS
QTC CALCULATION(BAZETT): 463 MS
QTC FREDERICIA: 426 MS
R AXIS: -15 DEGREES
T AXIS: 101 DEGREES
T OFFSET: 433 MS
VENTRICULAR RATE: 100 BPM

## 2024-09-09 LAB
FUNGUS SPEC CULT: NORMAL
FUNGUS SPEC FUNGUS STN: NORMAL

## 2024-09-11 ENCOUNTER — HOSPITAL ENCOUNTER (OUTPATIENT)
Dept: RADIOLOGY | Facility: HOSPITAL | Age: 54
Discharge: HOME | End: 2024-09-11
Payer: COMMERCIAL

## 2024-09-11 DIAGNOSIS — Z98.890 S/P PERICARDIAL WINDOW CREATION: ICD-10-CM

## 2024-09-11 LAB
ACID FAST STN SPEC: NORMAL
MYCOBACTERIUM SPEC CULT: NORMAL

## 2024-09-11 PROCEDURE — 71046 X-RAY EXAM CHEST 2 VIEWS: CPT

## 2024-09-11 PROCEDURE — 71046 X-RAY EXAM CHEST 2 VIEWS: CPT | Performed by: RADIOLOGY

## 2024-09-12 ENCOUNTER — OFFICE VISIT (OUTPATIENT)
Dept: CARDIAC SURGERY | Facility: CLINIC | Age: 54
End: 2024-09-12
Payer: COMMERCIAL

## 2024-09-12 ENCOUNTER — APPOINTMENT (OUTPATIENT)
Dept: RADIOLOGY | Facility: HOSPITAL | Age: 54
End: 2024-09-12
Payer: COMMERCIAL

## 2024-09-12 VITALS
WEIGHT: 129.4 LBS | HEART RATE: 113 BPM | HEIGHT: 63 IN | TEMPERATURE: 99 F | BODY MASS INDEX: 22.93 KG/M2 | DIASTOLIC BLOOD PRESSURE: 66 MMHG | OXYGEN SATURATION: 98 % | SYSTOLIC BLOOD PRESSURE: 91 MMHG

## 2024-09-12 DIAGNOSIS — C78.02 MALIGNANT NEOPLASM METASTATIC TO BOTH LUNGS (MULTI): ICD-10-CM

## 2024-09-12 DIAGNOSIS — R07.9 CHEST PAIN, UNSPECIFIED TYPE: ICD-10-CM

## 2024-09-12 DIAGNOSIS — I31.31 MALIGNANT PERICARDIAL EFFUSION (MULTI): ICD-10-CM

## 2024-09-12 DIAGNOSIS — J90 PLEURAL EFFUSION: ICD-10-CM

## 2024-09-12 DIAGNOSIS — C78.01 MALIGNANT NEOPLASM METASTATIC TO BOTH LUNGS (MULTI): ICD-10-CM

## 2024-09-12 PROCEDURE — 99211 OFF/OP EST MAY X REQ PHY/QHP: CPT | Performed by: NURSE PRACTITIONER

## 2024-09-12 RX ORDER — DOXYCYCLINE HYCLATE 100 MG
1 TABLET ORAL
COMMUNITY
Start: 2024-08-12

## 2024-09-12 RX ORDER — ALBUTEROL SULFATE 90 UG/1
INHALANT RESPIRATORY (INHALATION)
COMMUNITY
Start: 2024-08-30

## 2024-09-12 RX ORDER — TRAMADOL HYDROCHLORIDE 50 MG/1
50 TABLET ORAL EVERY 8 HOURS PRN
Qty: 21 TABLET | Refills: 0 | Status: SHIPPED | OUTPATIENT
Start: 2024-09-12 | End: 2024-09-19

## 2024-09-12 RX ORDER — FUROSEMIDE 20 MG/1
20 TABLET ORAL DAILY
Qty: 30 TABLET | Refills: 0 | Status: SHIPPED | OUTPATIENT
Start: 2024-09-12 | End: 2024-10-12

## 2024-09-12 RX ORDER — KETOROLAC TROMETHAMINE 10 MG/1
TABLET, FILM COATED ORAL
COMMUNITY
Start: 2024-09-04

## 2024-09-12 ASSESSMENT — ENCOUNTER SYMPTOMS
FATIGUE: 0
DIAPHORESIS: 0
FREQUENCY: 0
LOSS OF SENSATION IN FEET: 0
ABDOMINAL DISTENTION: 0
HALLUCINATIONS: 0
COLOR CHANGE: 0
CHEST TIGHTNESS: 0
CONSTIPATION: 0
FEVER: 0
SHORTNESS OF BREATH: 0
NERVOUS/ANXIOUS: 0
CONFUSION: 0
CHILLS: 0
TROUBLE SWALLOWING: 0
DEPRESSION: 0
PALPITATIONS: 0
MYALGIAS: 0
DIARRHEA: 0
SORE THROAT: 0
PHOTOPHOBIA: 0
DIZZINESS: 0
OCCASIONAL FEELINGS OF UNSTEADINESS: 0

## 2024-09-12 ASSESSMENT — PAIN SCALES - GENERAL: PAINLEVEL: 5

## 2024-09-12 NOTE — PROGRESS NOTES
Cardiac Surgery   Outpatient Clinic Note       Patient ID: Ayesha Nova ([id number])     Ayesha Nova is a 54 y.o. female who presents to the outpatient cardiac surgery clinic on 09/12/24 for a routine post-op visit; they are s/p redo pericardial window creation (subxiphoid approach) with Dr. Saad Giles on 08/27/24.     She has a PMH significant for longstanding recurrent metastatic breast cancer (initial dx 2016 - ductal carcinoma [G3, ER +, HER2/della amplified] with metastatic lesions to bilateral lungs - has declined treatment), LLL Lung Nodule (S/p surgical resection in 2018), Malignant Pericardial Effusion (S/p pericardial window 5/2/24 through a R VATS approach).     Following her initial window creation back in May 2024, she presented back to [facility] in August 2024 with complaints of dyspnea & chest discomfort. She was noted to be with a recurrent pericardial effusion with tamponade physiology along with a right pleural effusion.     She subsequently underwent a redo pericardial window creation through a subxiphoid approach on 08/27/2024. She recovered from the procedure well and was subsequently discharged to home on 08/29/24.     Since returning home, she was noted to be with ongoing pain that was unrelieved by acetaminophen & PO ketorolac. She presented to the ED on 9/3/24 and was ordered tramadol to take as needed.     She presents today for a routine post-op follow up visit.     Subjective   Review of Systems   Constitutional:  Negative for chills, diaphoresis, fatigue and fever.   HENT:  Negative for congestion, nosebleeds, sore throat and trouble swallowing.    Eyes:  Negative for photophobia.   Respiratory:  Negative for chest tightness and shortness of breath.    Cardiovascular:  Negative for chest pain, palpitations and leg swelling.   Gastrointestinal:  Negative for abdominal distention, constipation and diarrhea.   Endocrine: Negative for cold intolerance and heat intolerance.    Genitourinary:  Negative for frequency and urgency.   Musculoskeletal:  Negative for myalgias.   Skin:  Negative for color change and rash.   Allergic/Immunologic: Negative for environmental allergies.   Neurological:  Negative for dizziness.   Psychiatric/Behavioral:  Negative for confusion and hallucinations. The patient is not nervous/anxious.    All other systems reviewed and are negative.    Past Medical History:   Diagnosis Date    Personal history of malignant neoplasm of breast     History of malignant neoplasm of breast     Past Surgical History:   Procedure Laterality Date    HYSTERECTOMY  2016    Hysterectomy    OTHER SURGICAL HISTORY  2022    Breast reduction    OTHER SURGICAL HISTORY  2022    Lumpectomy    OTHER SURGICAL HISTORY  2022    Lung wedge resection     Family History   Problem Relation Name Age of Onset    Dementia Mother      Heart attack Father      Other (cabg) Father       Social History     Socioeconomic History    Marital status:      Spouse name: Not on file    Number of children: Not on file    Years of education: Not on file    Highest education level: Not on file   Occupational History    Not on file   Tobacco Use    Smoking status: Former     Current packs/day: 0.00     Types: Cigarettes     Quit date: 2020     Years since quittin.7    Smokeless tobacco: Never   Substance and Sexual Activity    Alcohol use: Never    Drug use: Never    Sexual activity: Not on file   Other Topics Concern    Not on file   Social History Narrative    Not on file     Social Determinants of Health     Financial Resource Strain: Low Risk  (2024)    Overall Financial Resource Strain (CARDIA)     Difficulty of Paying Living Expenses: Not hard at all   Food Insecurity: No Food Insecurity (2024)    Hunger Vital Sign     Worried About Running Out of Food in the Last Year: Never true     Ran Out of Food in the Last Year: Never true   Transportation Needs: No  Transportation Needs (8/25/2024)    PRAPARE - Transportation     Lack of Transportation (Medical): No     Lack of Transportation (Non-Medical): No   Physical Activity: Not on file   Stress: Not on file   Social Connections: Not on file   Intimate Partner Violence: Not on file   Housing Stability: Low Risk  (8/25/2024)    Housing Stability Vital Sign     Unable to Pay for Housing in the Last Year: No     Number of Times Moved in the Last Year: 1     Homeless in the Last Year: No       Current Medications   Current Outpatient Medications   Medication Instructions    albuterol 90 mcg/actuation inhaler     colchicine 0.6 mg, oral, Daily    doxycycline (Vibra-Tabs) 100 mg tablet 1 tablet, oral, Every 12 hours scheduled (0630,1830)    furosemide (LASIX) 20 mg, oral, Daily    hydrocortisone-pramoxine (Analpram-HC) 1-1 % rectal cream 1 Application, rectal, 2 times daily, to affected area    ketorolac (Toradol) 10 mg tablet TAKE 1 TABLET BY MOUTH EVERY 6 HOURS WITH FOOD AS NEEDED FOR PAIN        Objective   Vitals:    09/12/24 1105   BP: 91/66   Pulse: (!) 113   Temp: 37.2 °C (99 °F)   SpO2: 98%     Physical Exam  Constitutional:       General: She is not in acute distress.     Appearance: Normal appearance. She is not ill-appearing.   HENT:      Head: Normocephalic and atraumatic.      Nose: Nose normal.      Mouth/Throat:      Mouth: Mucous membranes are moist.      Pharynx: Oropharynx is clear.   Eyes:      Extraocular Movements: Extraocular movements intact.      Conjunctiva/sclera: Conjunctivae normal.      Pupils: Pupils are equal, round, and reactive to light.   Cardiovascular:      Rate and Rhythm: Regular rhythm. Tachycardia present.      Pulses: Normal pulses.      Heart sounds: Normal heart sounds, S1 normal and S2 normal. No murmur heard.     No systolic murmur is present.      No friction rub. No gallop.   Pulmonary:      Effort: Pulmonary effort is normal.      Breath sounds: Normal breath sounds.   Abdominal:       General: Abdomen is flat. Bowel sounds are normal. There is no distension.      Palpations: Abdomen is soft.   Musculoskeletal:         General: Normal range of motion.      Cervical back: Normal range of motion and neck supple.      Right lower leg: No edema.      Left lower leg: No edema.   Skin:     General: Skin is warm and dry.      Capillary Refill: Capillary refill takes less than 2 seconds.      Findings: No lesion.      Nails: There is no clubbing.      Comments: Subxiphoid incision well approximated   Neurological:      General: No focal deficit present.      Mental Status: She is alert and oriented to person, place, and time. Mental status is at baseline.      Cranial Nerves: Cranial nerves 2-12 are intact.      Sensory: Sensation is intact.      Motor: Motor function is intact.   Psychiatric:         Attention and Perception: Attention and perception normal.         Mood and Affect: Mood normal.         Speech: Speech normal.         Behavior: Behavior normal.         Thought Content: Thought content normal.         Cognition and Memory: Cognition and memory normal.         Judgment: Judgment normal.            Assessment & Plan     Ayesha Nova is a 54 y.o.female who presents today for a routine post-op visit.       Malignant Pericardial Effusion   - Secondary to underlying metastatic breast cancer   - S/p Urgent Pericardial Window (R VATS) on 5/2/24 with Cardiac Surgeon, Dr. Saad Giles   - Patient presented to the ED with tachycardia and dyspnea on 8/25  --> CT chest showing fluid around the pericardium   - TTE on 8/26 showing recurrent of pericardial fluid with a moderate sized effusion   - Now s/p redo pericardial window (Subxiphoid) on 8/27 with Dr. Giles  - Continue on colchicine 30 days post-op   - Repeat OP TTE pending        Acute Post-op Pain   - Tramadol 50mg q8h for 7 days ordered (OARRS verified - 21 tabs)         Malignant Pleural Effusion   - Right loculated effusion appreciated on  8/25 CT chest  - Current O2 Requirements: RA  - CXR showing small effusion   - To resume furosemide 20mg every day   - Follow up imaging in 1 week   - Discussed potential need for pleurx if effusion worsens despite diuretic therapy     Patient was seen and assessed today in the office.  She states that after returning home, her only complaint has been intermittent chest wall discomfort.  This is relieved by p.o. tramadol.  She has been without exertional dyspnea or syncope; overall, no concerning findings to indicate recurrent pericardial effusion, however a follow-up TTE has been ordered.    Note that she had stopped taking her oral furosemide at home, however after reviewing the patient's x-ray with her, she was advised to restart this as she is with a small to moderate right pleural effusion.  However she does not currently seem symptomatic from her effusion.  To resume her oral furosemide 20 mg once a day and repeat x-ray in 1 week.    Patient's oncology care was discussed, and she is currently following with a provider at the [facility].  She is undecided to start palliative therapy, however it was recommended that she pursue to avoid further symptoms or complications from her disease process.    Plan to follow-up with patient in 6 weeks.  Patient is advised to call the office if she is with worsening symptoms.               LEANDER Ch-[facility]   Cardiac Surgery Outpatient Clinic  [facility] [place] [facility] [address]   [address] [place] [address]  Office [phone]

## 2024-09-16 ENCOUNTER — HOSPITAL ENCOUNTER (OUTPATIENT)
Dept: CARDIOLOGY | Facility: HOSPITAL | Age: 54
Discharge: HOME | End: 2024-09-16
Payer: COMMERCIAL

## 2024-09-16 DIAGNOSIS — R07.9 CHEST PAIN, UNSPECIFIED: ICD-10-CM

## 2024-09-16 PROCEDURE — 93005 ELECTROCARDIOGRAM TRACING: CPT

## 2024-09-17 LAB
FUNGUS SPEC CULT: NORMAL
FUNGUS SPEC FUNGUS STN: NORMAL

## 2024-09-18 LAB
LAB AP ASR DISCLAIMER: NORMAL
LABORATORY COMMENT REPORT: NORMAL
PATH REPORT.ADDENDUM SPEC: NORMAL
PATH REPORT.COMMENTS IMP SPEC: NORMAL
PATH REPORT.FINAL DX SPEC: NORMAL
PATH REPORT.GROSS SPEC: NORMAL
PATH REPORT.RELEVANT HX SPEC: NORMAL
PATH REPORT.TOTAL CANCER: NORMAL

## 2024-09-18 PROCEDURE — 88312 SPECIAL STAINS GROUP 1: CPT | Performed by: STUDENT IN AN ORGANIZED HEALTH CARE EDUCATION/TRAINING PROGRAM

## 2024-09-18 PROCEDURE — 88342 IMHCHEM/IMCYTCHM 1ST ANTB: CPT | Performed by: STUDENT IN AN ORGANIZED HEALTH CARE EDUCATION/TRAINING PROGRAM

## 2024-09-18 PROCEDURE — 88341 IMHCHEM/IMCYTCHM EA ADD ANTB: CPT | Performed by: STUDENT IN AN ORGANIZED HEALTH CARE EDUCATION/TRAINING PROGRAM

## 2024-09-19 DIAGNOSIS — C78.01 MALIGNANT NEOPLASM METASTATIC TO BOTH LUNGS: ICD-10-CM

## 2024-09-19 DIAGNOSIS — C78.02 MALIGNANT NEOPLASM METASTATIC TO BOTH LUNGS: ICD-10-CM

## 2024-09-19 DIAGNOSIS — R07.9 CHEST PAIN, UNSPECIFIED TYPE: ICD-10-CM

## 2024-09-19 LAB
ACID FAST STN SPEC: NORMAL
MYCOBACTERIUM SPEC CULT: NORMAL

## 2024-09-19 RX ORDER — TRAMADOL HYDROCHLORIDE 50 MG/1
50 TABLET ORAL EVERY 8 HOURS PRN
Qty: 21 TABLET | Refills: 0 | Status: SHIPPED | OUTPATIENT
Start: 2024-09-19 | End: 2024-09-26

## 2024-09-19 NOTE — PROGRESS NOTES
[facility]   Cardiothoracic Surgery   Clinical Update Note    Interval HPI: Patient is s/p metastatic breast cancer with lesions to her bilateral lungs who is s/p pericardial window x2. Since her operation, she has been with ongoing pain that has been relieved with PRN tramadol.     She had called to ask for a refill of her tramadol, which she continues to use on a PRN basis. Will submit a refill, and also refer to pain medicine for follow up and optimization for better (and likely) long term control given her metastatic disease process.     Objective   There were no vitals taken for this visit.  Current Outpatient Medications on File Prior to Visit   Medication Sig Dispense Refill    albuterol 90 mcg/actuation inhaler       colchicine 0.6 mg tablet Take 1 tablet (0.6 mg) by mouth once daily. 30 tablet 0    doxycycline (Vibra-Tabs) 100 mg tablet Take 1 tablet (100 mg) by mouth every 12 hours.      furosemide (Lasix) 20 mg tablet Take 1 tablet (20 mg) by mouth once daily. 30 tablet 0    hydrocortisone-pramoxine (Analpram-HC) 1-1 % rectal cream Insert 1 Application into the rectum 2 times a day. to affected area      ketorolac (Toradol) 10 mg tablet TAKE 1 TABLET BY MOUTH EVERY 6 HOURS WITH FOOD AS NEEDED FOR PAIN      traMADol (Ultram) 50 mg tablet Take 1 tablet (50 mg) by mouth every 8 hours if needed for severe pain (7 - 10) for up to 7 days. 21 tablet 0     No current facility-administered medications on file prior to visit.          Jason Ellison, APRN-CNP

## 2024-09-20 ENCOUNTER — HOSPITAL ENCOUNTER (OUTPATIENT)
Dept: CARDIOLOGY | Facility: CLINIC | Age: 54
Discharge: HOME | End: 2024-09-20
Payer: COMMERCIAL

## 2024-09-20 DIAGNOSIS — I31.31 MALIGNANT PERICARDIAL EFFUSION (MULTI): ICD-10-CM

## 2024-09-20 DIAGNOSIS — J90 PLEURAL EFFUSION: ICD-10-CM

## 2024-09-20 DIAGNOSIS — C78.01 MALIGNANT NEOPLASM METASTATIC TO BOTH LUNGS: ICD-10-CM

## 2024-09-20 DIAGNOSIS — C78.02 MALIGNANT NEOPLASM METASTATIC TO BOTH LUNGS: ICD-10-CM

## 2024-09-20 PROCEDURE — 93308 TTE F-UP OR LMTD: CPT | Performed by: STUDENT IN AN ORGANIZED HEALTH CARE EDUCATION/TRAINING PROGRAM

## 2024-09-20 PROCEDURE — 93308 TTE F-UP OR LMTD: CPT

## 2024-09-21 DIAGNOSIS — C78.01 MALIGNANT NEOPLASM METASTATIC TO BOTH LUNGS: ICD-10-CM

## 2024-09-21 DIAGNOSIS — C78.02 MALIGNANT NEOPLASM METASTATIC TO BOTH LUNGS: ICD-10-CM

## 2024-09-21 DIAGNOSIS — J90 PLEURAL EFFUSION: ICD-10-CM

## 2024-09-21 DIAGNOSIS — I31.31 MALIGNANT PERICARDIAL EFFUSION (MULTI): ICD-10-CM

## 2024-09-22 LAB
EJECTION FRACTION APICAL 4 CHAMBER: 54.4
EJECTION FRACTION: 57 %
LEFT VENTRICLE INTERNAL DIMENSION DIASTOLE: 3.71 CM (ref 3.5–6)
LEFT VENTRICULAR OUTFLOW TRACT DIAMETER: 1.95 CM

## 2024-09-23 PROCEDURE — 93005 ELECTROCARDIOGRAM TRACING: CPT

## 2024-09-25 LAB
ACID FAST STN SPEC: NORMAL
MYCOBACTERIUM SPEC CULT: NORMAL

## 2024-09-26 ENCOUNTER — HOSPITAL ENCOUNTER (OUTPATIENT)
Dept: RADIOLOGY | Facility: HOSPITAL | Age: 54
Discharge: HOME | End: 2024-09-26
Payer: COMMERCIAL

## 2024-09-26 DIAGNOSIS — C78.01 MALIGNANT NEOPLASM METASTATIC TO BOTH LUNGS: ICD-10-CM

## 2024-09-26 DIAGNOSIS — C78.02 MALIGNANT NEOPLASM METASTATIC TO BOTH LUNGS: ICD-10-CM

## 2024-09-26 LAB
ATRIAL RATE: 70 BPM
DIASTOLIC BLOOD PRESSURE: 70 MMHG
P AXIS: 91 DEGREES
P OFFSET: 177 MS
P ONSET: 135 MS
PR INTERVAL: 174 MS
Q ONSET: 222 MS
QRS COUNT: 12 BEATS
QRS DURATION: 90 MS
QT INTERVAL: 432 MS
QTC CALCULATION(BAZETT): 466 MS
QTC FREDERICIA: 455 MS
R AXIS: 66 DEGREES
SYSTOLIC BLOOD PRESSURE: 160 MMHG
T AXIS: -80 DEGREES
T OFFSET: 438 MS
VENTRICULAR RATE: 70 BPM

## 2024-09-26 PROCEDURE — 71046 X-RAY EXAM CHEST 2 VIEWS: CPT | Performed by: RADIOLOGY

## 2024-09-26 PROCEDURE — 71046 X-RAY EXAM CHEST 2 VIEWS: CPT

## 2024-09-30 RX ORDER — FUROSEMIDE 20 MG/1
20 TABLET ORAL DAILY
Qty: 90 TABLET | Refills: 1 | Status: SHIPPED | OUTPATIENT
Start: 2024-09-30

## 2024-10-02 LAB
ACID FAST STN SPEC: NORMAL
MYCOBACTERIUM SPEC CULT: NORMAL

## 2024-10-08 ENCOUNTER — TELEPHONE (OUTPATIENT)
Dept: PRIMARY CARE | Facility: CLINIC | Age: 54
End: 2024-10-08
Payer: COMMERCIAL

## 2024-10-08 NOTE — TELEPHONE ENCOUNTER
I called Ayesha and informed her that the nurse practitioner did get back to me on her CXR and furosemide medication. He stated that she does have a stable pleural effusion/water in the lungs and that she should continue to take the Lasix/Furosemide. He will go over everything with her at the time of her office visit on 10/21/24. She acknowledged an understanding.

## 2024-10-09 LAB
ACID FAST STN SPEC: NORMAL
MYCOBACTERIUM SPEC CULT: NORMAL

## 2024-10-16 LAB
ACID FAST STN SPEC: NORMAL
MYCOBACTERIUM SPEC CULT: NORMAL

## 2024-10-18 ENCOUNTER — APPOINTMENT (OUTPATIENT)
Dept: RADIOLOGY | Facility: HOSPITAL | Age: 54
DRG: 054 | End: 2024-10-18
Payer: COMMERCIAL

## 2024-10-18 ENCOUNTER — HOSPITAL ENCOUNTER (INPATIENT)
Facility: HOSPITAL | Age: 54
LOS: 2 days | Discharge: HOME | DRG: 054 | End: 2024-10-20
Attending: EMERGENCY MEDICINE | Admitting: INTERNAL MEDICINE
Payer: COMMERCIAL

## 2024-10-18 ENCOUNTER — APPOINTMENT (OUTPATIENT)
Dept: CARDIOLOGY | Facility: HOSPITAL | Age: 54
DRG: 054 | End: 2024-10-18
Payer: COMMERCIAL

## 2024-10-18 ENCOUNTER — HOSPITAL ENCOUNTER (EMERGENCY)
Facility: HOSPITAL | Age: 54
End: 2024-10-18
Payer: COMMERCIAL

## 2024-10-18 DIAGNOSIS — R29.90 STROKE-LIKE EPISODE: ICD-10-CM

## 2024-10-18 DIAGNOSIS — G93.89 BRAIN MASS: Primary | ICD-10-CM

## 2024-10-18 PROBLEM — R53.1 LEFT-SIDED WEAKNESS: Status: ACTIVE | Noted: 2024-10-18

## 2024-10-18 LAB
ALBUMIN SERPL BCP-MCNC: 3.9 G/DL (ref 3.4–5)
ALP SERPL-CCNC: 40 U/L (ref 33–110)
ALT SERPL W P-5'-P-CCNC: 10 U/L (ref 7–45)
ANION GAP SERPL CALC-SCNC: 13 MMOL/L (ref 10–20)
APTT PPP: 31 SECONDS (ref 27–38)
AST SERPL W P-5'-P-CCNC: 17 U/L (ref 9–39)
BASOPHILS # BLD AUTO: 0.04 X10*3/UL (ref 0–0.1)
BASOPHILS NFR BLD AUTO: 0.5 %
BILIRUB SERPL-MCNC: 0.8 MG/DL (ref 0–1.2)
BNP SERPL-MCNC: 40 PG/ML (ref 0–99)
BUN SERPL-MCNC: 18 MG/DL (ref 6–23)
CALCIUM SERPL-MCNC: 9.5 MG/DL (ref 8.6–10.3)
CARDIAC TROPONIN I PNL SERPL HS: <3 NG/L (ref 0–13)
CHLORIDE SERPL-SCNC: 100 MMOL/L (ref 98–107)
CHOLEST SERPL-MCNC: 168 MG/DL (ref 0–199)
CHOLESTEROL/HDL RATIO: 3.2
CO2 SERPL-SCNC: 27 MMOL/L (ref 21–32)
CREAT SERPL-MCNC: 0.64 MG/DL (ref 0.5–1.05)
EGFRCR SERPLBLD CKD-EPI 2021: >90 ML/MIN/1.73M*2
EOSINOPHIL # BLD AUTO: 0.05 X10*3/UL (ref 0–0.7)
EOSINOPHIL NFR BLD AUTO: 0.6 %
ERYTHROCYTE [DISTWIDTH] IN BLOOD BY AUTOMATED COUNT: 16.7 % (ref 11.5–14.5)
GLUCOSE BLD MANUAL STRIP-MCNC: 87 MG/DL (ref 74–99)
GLUCOSE SERPL-MCNC: 85 MG/DL (ref 74–99)
HCT VFR BLD AUTO: 33.3 % (ref 36–46)
HDLC SERPL-MCNC: 53.1 MG/DL
HGB BLD-MCNC: 10.5 G/DL (ref 12–16)
IMM GRANULOCYTES # BLD AUTO: 0.03 X10*3/UL (ref 0–0.7)
IMM GRANULOCYTES NFR BLD AUTO: 0.4 % (ref 0–0.9)
INR PPP: 1.2 (ref 0.9–1.1)
LDLC SERPL CALC-MCNC: 96 MG/DL
LYMPHOCYTES # BLD AUTO: 1.16 X10*3/UL (ref 1.2–4.8)
LYMPHOCYTES NFR BLD AUTO: 14.3 %
MCH RBC QN AUTO: 26.4 PG (ref 26–34)
MCHC RBC AUTO-ENTMCNC: 31.5 G/DL (ref 32–36)
MCV RBC AUTO: 84 FL (ref 80–100)
MONOCYTES # BLD AUTO: 0.44 X10*3/UL (ref 0.1–1)
MONOCYTES NFR BLD AUTO: 5.4 %
NEUTROPHILS # BLD AUTO: 6.41 X10*3/UL (ref 1.2–7.7)
NEUTROPHILS NFR BLD AUTO: 78.8 %
NON HDL CHOLESTEROL: 115 MG/DL (ref 0–149)
NRBC BLD-RTO: 0 /100 WBCS (ref 0–0)
PLATELET # BLD AUTO: 389 X10*3/UL (ref 150–450)
POTASSIUM SERPL-SCNC: 4.2 MMOL/L (ref 3.5–5.3)
PROT SERPL-MCNC: 6.9 G/DL (ref 6.4–8.2)
PROTHROMBIN TIME: 13 SECONDS (ref 9.8–12.8)
RBC # BLD AUTO: 3.97 X10*6/UL (ref 4–5.2)
SODIUM SERPL-SCNC: 136 MMOL/L (ref 136–145)
TRIGL SERPL-MCNC: 94 MG/DL (ref 0–149)
VLDL: 19 MG/DL (ref 0–40)
WBC # BLD AUTO: 8.1 X10*3/UL (ref 4.4–11.3)

## 2024-10-18 PROCEDURE — 70496 CT ANGIOGRAPHY HEAD: CPT | Performed by: RADIOLOGY

## 2024-10-18 PROCEDURE — 99223 1ST HOSP IP/OBS HIGH 75: CPT | Performed by: PSYCHIATRY & NEUROLOGY

## 2024-10-18 PROCEDURE — 82947 ASSAY GLUCOSE BLOOD QUANT: CPT

## 2024-10-18 PROCEDURE — 70450 CT HEAD/BRAIN W/O DYE: CPT

## 2024-10-18 PROCEDURE — 70551 MRI BRAIN STEM W/O DYE: CPT

## 2024-10-18 PROCEDURE — 70498 CT ANGIOGRAPHY NECK: CPT

## 2024-10-18 PROCEDURE — 84484 ASSAY OF TROPONIN QUANT: CPT | Performed by: EMERGENCY MEDICINE

## 2024-10-18 PROCEDURE — 36415 COLL VENOUS BLD VENIPUNCTURE: CPT | Performed by: EMERGENCY MEDICINE

## 2024-10-18 PROCEDURE — 85730 THROMBOPLASTIN TIME PARTIAL: CPT | Performed by: EMERGENCY MEDICINE

## 2024-10-18 PROCEDURE — 71046 X-RAY EXAM CHEST 2 VIEWS: CPT | Performed by: RADIOLOGY

## 2024-10-18 PROCEDURE — 83036 HEMOGLOBIN GLYCOSYLATED A1C: CPT | Performed by: PHYSICIAN ASSISTANT

## 2024-10-18 PROCEDURE — 2500000001 HC RX 250 WO HCPCS SELF ADMINISTERED DRUGS (ALT 637 FOR MEDICARE OP)

## 2024-10-18 PROCEDURE — 85610 PROTHROMBIN TIME: CPT | Performed by: EMERGENCY MEDICINE

## 2024-10-18 PROCEDURE — 2550000001 HC RX 255 CONTRASTS: Performed by: EMERGENCY MEDICINE

## 2024-10-18 PROCEDURE — 70498 CT ANGIOGRAPHY NECK: CPT | Performed by: RADIOLOGY

## 2024-10-18 PROCEDURE — 99285 EMERGENCY DEPT VISIT HI MDM: CPT

## 2024-10-18 PROCEDURE — 93005 ELECTROCARDIOGRAM TRACING: CPT

## 2024-10-18 PROCEDURE — 2500000004 HC RX 250 GENERAL PHARMACY W/ HCPCS (ALT 636 FOR OP/ED): Performed by: PHYSICIAN ASSISTANT

## 2024-10-18 PROCEDURE — 70551 MRI BRAIN STEM W/O DYE: CPT | Performed by: RADIOLOGY

## 2024-10-18 PROCEDURE — 71046 X-RAY EXAM CHEST 2 VIEWS: CPT

## 2024-10-18 PROCEDURE — 83718 ASSAY OF LIPOPROTEIN: CPT | Performed by: PHYSICIAN ASSISTANT

## 2024-10-18 PROCEDURE — 85025 COMPLETE CBC W/AUTO DIFF WBC: CPT | Performed by: EMERGENCY MEDICINE

## 2024-10-18 PROCEDURE — 1200000002 HC GENERAL ROOM WITH TELEMETRY DAILY

## 2024-10-18 PROCEDURE — 2500000004 HC RX 250 GENERAL PHARMACY W/ HCPCS (ALT 636 FOR OP/ED): Performed by: EMERGENCY MEDICINE

## 2024-10-18 PROCEDURE — 80053 COMPREHEN METABOLIC PANEL: CPT | Performed by: EMERGENCY MEDICINE

## 2024-10-18 PROCEDURE — 99221 1ST HOSP IP/OBS SF/LOW 40: CPT | Performed by: NEUROLOGICAL SURGERY

## 2024-10-18 PROCEDURE — 70450 CT HEAD/BRAIN W/O DYE: CPT | Performed by: RADIOLOGY

## 2024-10-18 PROCEDURE — 2500000001 HC RX 250 WO HCPCS SELF ADMINISTERED DRUGS (ALT 637 FOR MEDICARE OP): Performed by: PHYSICIAN ASSISTANT

## 2024-10-18 PROCEDURE — 83880 ASSAY OF NATRIURETIC PEPTIDE: CPT | Performed by: PHYSICIAN ASSISTANT

## 2024-10-18 RX ORDER — ACETAMINOPHEN 650 MG/1
650 SUPPOSITORY RECTAL EVERY 4 HOURS PRN
Status: DISCONTINUED | OUTPATIENT
Start: 2024-10-18 | End: 2024-10-20 | Stop reason: HOSPADM

## 2024-10-18 RX ORDER — IBUPROFEN 200 MG
200 TABLET ORAL EVERY 6 HOURS
COMMUNITY
End: 2024-10-24 | Stop reason: ALTCHOICE

## 2024-10-18 RX ORDER — HYDROCORTISONE 1 %
CREAM (GRAM) TOPICAL 2 TIMES DAILY PRN
Status: DISCONTINUED | OUTPATIENT
Start: 2024-10-18 | End: 2024-10-20 | Stop reason: HOSPADM

## 2024-10-18 RX ORDER — ENOXAPARIN SODIUM 100 MG/ML
40 INJECTION SUBCUTANEOUS EVERY 24 HOURS
Status: DISCONTINUED | OUTPATIENT
Start: 2024-10-18 | End: 2024-10-20 | Stop reason: HOSPADM

## 2024-10-18 RX ORDER — IBUPROFEN 400 MG/1
400 TABLET ORAL ONCE
Status: COMPLETED | OUTPATIENT
Start: 2024-10-18 | End: 2024-10-18

## 2024-10-18 RX ORDER — DIAZEPAM 5 MG/1
10 TABLET ORAL ONCE
Status: DISCONTINUED | OUTPATIENT
Start: 2024-10-18 | End: 2024-10-18

## 2024-10-18 RX ORDER — ATORVASTATIN CALCIUM 40 MG/1
40 TABLET, FILM COATED ORAL NIGHTLY
Status: DISCONTINUED | OUTPATIENT
Start: 2024-10-18 | End: 2024-10-20 | Stop reason: HOSPADM

## 2024-10-18 RX ORDER — ACETAMINOPHEN 160 MG/5ML
650 SOLUTION ORAL EVERY 4 HOURS PRN
Status: DISCONTINUED | OUTPATIENT
Start: 2024-10-18 | End: 2024-10-20 | Stop reason: HOSPADM

## 2024-10-18 RX ORDER — ALBUTEROL SULFATE 0.83 MG/ML
2.5 SOLUTION RESPIRATORY (INHALATION) EVERY 4 HOURS PRN
Status: DISCONTINUED | OUTPATIENT
Start: 2024-10-18 | End: 2024-10-18

## 2024-10-18 RX ORDER — ACETAMINOPHEN 325 MG/1
650 TABLET ORAL EVERY 4 HOURS PRN
Status: DISCONTINUED | OUTPATIENT
Start: 2024-10-18 | End: 2024-10-20 | Stop reason: HOSPADM

## 2024-10-18 RX ORDER — FUROSEMIDE 20 MG/1
20 TABLET ORAL DAILY
Status: DISCONTINUED | OUTPATIENT
Start: 2024-10-19 | End: 2024-10-20 | Stop reason: HOSPADM

## 2024-10-18 RX ORDER — GLUCOSAM/CHONDRO/HERB 149/HYAL 750-100 MG
1 TABLET ORAL
COMMUNITY

## 2024-10-18 RX ORDER — MELATONIN 10 MG/ML
1 DROPS ORAL DAILY
COMMUNITY

## 2024-10-18 RX ORDER — NAPROXEN SODIUM 220 MG/1
81 TABLET, FILM COATED ORAL DAILY
Status: DISCONTINUED | OUTPATIENT
Start: 2024-10-18 | End: 2024-10-20 | Stop reason: HOSPADM

## 2024-10-18 RX ORDER — DIAZEPAM 5 MG/ML
5 INJECTION, SOLUTION INTRAMUSCULAR; INTRAVENOUS
Status: DISPENSED | OUTPATIENT
Start: 2024-10-18 | End: 2024-10-19

## 2024-10-18 RX ORDER — DIAZEPAM 5 MG/ML
5 INJECTION, SOLUTION INTRAMUSCULAR; INTRAVENOUS ONCE
Status: COMPLETED | OUTPATIENT
Start: 2024-10-18 | End: 2024-10-18

## 2024-10-18 RX ORDER — ALBUTEROL SULFATE 0.83 MG/ML
2.5 SOLUTION RESPIRATORY (INHALATION) EVERY 2 HOUR PRN
Status: DISCONTINUED | OUTPATIENT
Start: 2024-10-18 | End: 2024-10-20 | Stop reason: HOSPADM

## 2024-10-18 SDOH — ECONOMIC STABILITY: INCOME INSECURITY: IN THE PAST 12 MONTHS HAS THE ELECTRIC, GAS, OIL, OR WATER COMPANY THREATENED TO SHUT OFF SERVICES IN YOUR HOME?: NO

## 2024-10-18 SDOH — SOCIAL STABILITY: SOCIAL INSECURITY: WITHIN THE LAST YEAR, HAVE YOU BEEN HUMILIATED OR EMOTIONALLY ABUSED IN OTHER WAYS BY YOUR PARTNER OR EX-PARTNER?: NO

## 2024-10-18 SDOH — ECONOMIC STABILITY: FOOD INSECURITY: HOW HARD IS IT FOR YOU TO PAY FOR THE VERY BASICS LIKE FOOD, HOUSING, MEDICAL CARE, AND HEATING?: NOT VERY HARD

## 2024-10-18 SDOH — SOCIAL STABILITY: SOCIAL INSECURITY
WITHIN THE LAST YEAR, HAVE YOU BEEN RAPED OR FORCED TO HAVE ANY KIND OF SEXUAL ACTIVITY BY YOUR PARTNER OR EX-PARTNER?: NO

## 2024-10-18 SDOH — SOCIAL STABILITY: SOCIAL INSECURITY: DO YOU FEEL ANYONE HAS EXPLOITED OR TAKEN ADVANTAGE OF YOU FINANCIALLY OR OF YOUR PERSONAL PROPERTY?: NO

## 2024-10-18 SDOH — SOCIAL STABILITY: SOCIAL INSECURITY: ARE YOU OR HAVE YOU BEEN THREATENED OR ABUSED PHYSICALLY, EMOTIONALLY, OR SEXUALLY BY ANYONE?: NO

## 2024-10-18 SDOH — SOCIAL STABILITY: SOCIAL INSECURITY: HAS ANYONE EVER THREATENED TO HURT YOUR FAMILY OR YOUR PETS?: NO

## 2024-10-18 SDOH — ECONOMIC STABILITY: FOOD INSECURITY: WITHIN THE PAST 12 MONTHS, YOU WORRIED THAT YOUR FOOD WOULD RUN OUT BEFORE YOU GOT THE MONEY TO BUY MORE.: NEVER TRUE

## 2024-10-18 SDOH — SOCIAL STABILITY: SOCIAL INSECURITY: ARE THERE ANY APPARENT SIGNS OF INJURIES/BEHAVIORS THAT COULD BE RELATED TO ABUSE/NEGLECT?: NO

## 2024-10-18 SDOH — ECONOMIC STABILITY: HOUSING INSECURITY: AT ANY TIME IN THE PAST 12 MONTHS, WERE YOU HOMELESS OR LIVING IN A SHELTER (INCLUDING NOW)?: NO

## 2024-10-18 SDOH — ECONOMIC STABILITY: HOUSING INSECURITY: IN THE LAST 12 MONTHS, WAS THERE A TIME WHEN YOU WERE NOT ABLE TO PAY THE MORTGAGE OR RENT ON TIME?: NO

## 2024-10-18 SDOH — SOCIAL STABILITY: SOCIAL INSECURITY: WITHIN THE LAST YEAR, HAVE YOU BEEN AFRAID OF YOUR PARTNER OR EX-PARTNER?: NO

## 2024-10-18 SDOH — ECONOMIC STABILITY: FOOD INSECURITY: WITHIN THE PAST 12 MONTHS, THE FOOD YOU BOUGHT JUST DIDN'T LAST AND YOU DIDN'T HAVE MONEY TO GET MORE.: NEVER TRUE

## 2024-10-18 SDOH — SOCIAL STABILITY: SOCIAL INSECURITY
WITHIN THE LAST YEAR, HAVE YOU BEEN KICKED, HIT, SLAPPED, OR OTHERWISE PHYSICALLY HURT BY YOUR PARTNER OR EX-PARTNER?: NO

## 2024-10-18 SDOH — SOCIAL STABILITY: SOCIAL INSECURITY: ABUSE: ADULT

## 2024-10-18 SDOH — ECONOMIC STABILITY: TRANSPORTATION INSECURITY: IN THE PAST 12 MONTHS, HAS LACK OF TRANSPORTATION KEPT YOU FROM MEDICAL APPOINTMENTS OR FROM GETTING MEDICATIONS?: NO

## 2024-10-18 SDOH — ECONOMIC STABILITY: HOUSING INSECURITY: IN THE PAST 12 MONTHS, HOW MANY TIMES HAVE YOU MOVED WHERE YOU WERE LIVING?: 0

## 2024-10-18 SDOH — SOCIAL STABILITY: SOCIAL INSECURITY: WERE YOU ABLE TO COMPLETE ALL THE BEHAVIORAL HEALTH SCREENINGS?: YES

## 2024-10-18 SDOH — SOCIAL STABILITY: SOCIAL INSECURITY: DOES ANYONE TRY TO KEEP YOU FROM HAVING/CONTACTING OTHER FRIENDS OR DOING THINGS OUTSIDE YOUR HOME?: NO

## 2024-10-18 SDOH — SOCIAL STABILITY: SOCIAL INSECURITY: HAVE YOU HAD ANY THOUGHTS OF HARMING ANYONE ELSE?: NO

## 2024-10-18 SDOH — SOCIAL STABILITY: SOCIAL INSECURITY: DO YOU FEEL UNSAFE GOING BACK TO THE PLACE WHERE YOU ARE LIVING?: NO

## 2024-10-18 SDOH — SOCIAL STABILITY: SOCIAL INSECURITY: HAVE YOU HAD THOUGHTS OF HARMING ANYONE ELSE?: NO

## 2024-10-18 ASSESSMENT — COGNITIVE AND FUNCTIONAL STATUS - GENERAL
PERSONAL GROOMING: A LITTLE
MOBILITY SCORE: 24
DAILY ACTIVITIY SCORE: 24
MOBILITY SCORE: 24
DAILY ACTIVITIY SCORE: 23
PATIENT BASELINE BEDBOUND: NO

## 2024-10-18 ASSESSMENT — LIFESTYLE VARIABLES
EVER HAD A DRINK FIRST THING IN THE MORNING TO STEADY YOUR NERVES TO GET RID OF A HANGOVER: NO
SKIP TO QUESTIONS 9-10: 1
HAVE PEOPLE ANNOYED YOU BY CRITICIZING YOUR DRINKING: NO
HOW MANY STANDARD DRINKS CONTAINING ALCOHOL DO YOU HAVE ON A TYPICAL DAY: PATIENT DOES NOT DRINK
TOTAL SCORE: 0
HAVE YOU EVER FELT YOU SHOULD CUT DOWN ON YOUR DRINKING: NO
EVER FELT BAD OR GUILTY ABOUT YOUR DRINKING: NO
HOW OFTEN DO YOU HAVE 6 OR MORE DRINKS ON ONE OCCASION: NEVER
AUDIT-C TOTAL SCORE: 0
AUDIT-C TOTAL SCORE: 0
HOW OFTEN DO YOU HAVE A DRINK CONTAINING ALCOHOL: NEVER

## 2024-10-18 ASSESSMENT — PATIENT HEALTH QUESTIONNAIRE - PHQ9
2. FEELING DOWN, DEPRESSED OR HOPELESS: SEVERAL DAYS
SUM OF ALL RESPONSES TO PHQ9 QUESTIONS 1 & 2: 2
1. LITTLE INTEREST OR PLEASURE IN DOING THINGS: SEVERAL DAYS

## 2024-10-18 ASSESSMENT — ACTIVITIES OF DAILY LIVING (ADL)
GROOMING: INDEPENDENT
LACK_OF_TRANSPORTATION: NO
JUDGMENT_ADEQUATE_SAFELY_COMPLETE_DAILY_ACTIVITIES: YES
DRESSING YOURSELF: INDEPENDENT
HEARING - LEFT EAR: FUNCTIONAL
FEEDING YOURSELF: INDEPENDENT
BATHING: INDEPENDENT
HEARING - RIGHT EAR: FUNCTIONAL
TOILETING: INDEPENDENT
LACK_OF_TRANSPORTATION: NO
PATIENT'S MEMORY ADEQUATE TO SAFELY COMPLETE DAILY ACTIVITIES?: YES
WALKS IN HOME: INDEPENDENT
ADEQUATE_TO_COMPLETE_ADL: YES

## 2024-10-18 ASSESSMENT — COLUMBIA-SUICIDE SEVERITY RATING SCALE - C-SSRS
2. HAVE YOU ACTUALLY HAD ANY THOUGHTS OF KILLING YOURSELF?: NO
6. HAVE YOU EVER DONE ANYTHING, STARTED TO DO ANYTHING, OR PREPARED TO DO ANYTHING TO END YOUR LIFE?: NO
1. IN THE PAST MONTH, HAVE YOU WISHED YOU WERE DEAD OR WISHED YOU COULD GO TO SLEEP AND NOT WAKE UP?: NO

## 2024-10-18 ASSESSMENT — PAIN DESCRIPTION - LOCATION: LOCATION: CHEST

## 2024-10-18 ASSESSMENT — PAIN SCALES - GENERAL
PAINLEVEL_OUTOF10: 0 - NO PAIN
PAINLEVEL_OUTOF10: 6
PAINLEVEL_OUTOF10: 2

## 2024-10-18 ASSESSMENT — PAIN - FUNCTIONAL ASSESSMENT: PAIN_FUNCTIONAL_ASSESSMENT: 0-10

## 2024-10-18 ASSESSMENT — PAIN DESCRIPTION - ORIENTATION: ORIENTATION: RIGHT;LEFT

## 2024-10-18 ASSESSMENT — ENCOUNTER SYMPTOMS
SPEECH DIFFICULTY: 1
WEAKNESS: 1

## 2024-10-18 NOTE — ED TRIAGE NOTES
Pt BIBA for stroke like symptoms. Pt was having a hard time talking to her . Last known well was 12:15. Pt also has some drift on her left arm and leg. Pt able to speak upon arrival but still suffering from drift in left arm and leg. Pt has hx of cancer.

## 2024-10-18 NOTE — PROGRESS NOTES
Pharmacy Medication History Review    Ayesha Nova is a 54 y.o. female admitted for No Principal Problem: There is no principal problem currently on the Problem List. Please update the Problem List and refresh.. Pharmacy reviewed the patient's cvyvi-xh-ghdoezayt medications and allergies for accuracy.    The list below reflectives the updated PTA list. Please review each medication in order reconciliation for additional clarification and justification.  Prior to Admission medications    Medication Sig Start Date End Date Taking? Authorizing Provider   albuterol 90 mcg/actuation inhaler Inhale 2 puffs every 4 hours if needed for wheezing or shortness of breath. 8/30/24  Yes Historical Provider, MD   cholecalciferol, vitamin D3, 10 mcg/drop (400 unit/drop) drops Take 1 drop by mouth once daily.   Yes Historical Provider, MD   furosemide (Lasix) 20 mg tablet TAKE 1 TABLET(20 MG) BY MOUTH DAILY 9/30/24  Yes LEANDER Ch-CNP   ibuprofen 200 mg tablet Take 1 tablet (200 mg) by mouth every 6 hours.   Yes Historical Provider, MD   omega 3-dha-epa-fish oil (Fish OiL) 1,000 (120-180) mg capsule Take 1 capsule (1,000 mg) by mouth.   Yes Historical Provider, MD   hydrocortisone-pramoxine (Analpram-HC) 1-1 % rectal cream Insert 1 Application into the rectum 2 times a day as needed for hemorrhoids. 5/15/24  yes Historical Provider, MD        The list below reflectives the updated allergy list. Please review each documented allergy for additional clarification and justification.  Allergies  Reviewed by Karla Bejarano on 10/18/2024        Severity Reactions Comments    Vancomycin High Anaphylaxis, Itching     Morphine Medium Other Patient states MS does not work at all for her !    Zofran Odt [ondansetron] Medium Headache     Sulfa (sulfonamide Antibiotics) Not Specified Unknown Unsure what happed, in childhood            Below are additional concerns with the patient's PTA list.      Karla Bejarano

## 2024-10-18 NOTE — CONSULTS
Inpatient consult to Neurology  Consult performed by: Jordan Matthews MD  Consult ordered by: Ashley Queen DO          History Of Present Illness  Ayesha Nova is a 54 y.o. female presenting with speech problems.  The patient and  were both in the room.  The patient states that she recently had a pericardial window done.  When she got home she noted that she was having problems with coordination in her limbs.  The patient also has been having difficulties with left-sided strength.  She states that both of these issues have been going on for about 2 weeks.  This morning the patient noted the onset of difficulties with speech.  She felt that she had some numbness in her left hand that started acutely.  The patient was taken by squad to the ER where a stroke team was called.  The patient had a CT scan of the brain done that showed what appeared to be masses in the right and left hemispheres.  Currently the patient still feels the time she has difficulties with speech and she still having problems with coordination and some mild left-sided weakness.  The patient is not an IV TNK candidate as it appears as though she has metastatic lesions in her brain.  She denies any headache, nausea, vomiting, seizure activity or difficulties with ambulation.      Past Medical History  Past Medical History:   Diagnosis Date    Personal history of malignant neoplasm of breast     History of malignant neoplasm of breast     Surgical History  Past Surgical History:   Procedure Laterality Date    HYSTERECTOMY  2016    Hysterectomy    OTHER SURGICAL HISTORY  2022    Breast reduction    OTHER SURGICAL HISTORY  2022    Lumpectomy    OTHER SURGICAL HISTORY  2022    Lung wedge resection     Social History  Social History     Tobacco Use    Smoking status: Former     Current packs/day: 0.00     Types: Cigarettes     Quit date:      Years since quittin.8    Smokeless tobacco: Never   Substance Use  "Topics    Alcohol use: Never    Drug use: Never   The patient social history shows that she is  and does not smoke, drink or use illicit drugs.    Allergies  Vancomycin, Morphine, and Sulfa (sulfonamide antibiotics)  (Not in a hospital admission)    Family history  The patient's family history shows that her grandmother, aunt and niece all had breast cancer and her father  of heart disease.    Review of Systems   Neurological:  Positive for speech difficulty and weakness.   All other systems reviewed and are negative.        Neurological Exam  Physical Exam  Last Recorded Vitals  Blood pressure 111/67, pulse 96, temperature 36.6 °C (97.9 °F), resp. rate 18, height 1.575 m (5' 2\"), weight 50.5 kg (111 lb 5.3 oz), SpO2 100%.    The patient is a well developed, [well-nourished] [female] in no acute distress.    The patient's funduscopic examination shows no papilledema bilaterally.    The patient's extremity examination shows that the pulses are 2+ in the upper and lower extremities bilaterally and there is no edema in the lower extremities bilaterally.    The patient's mental status testing is alert and oriented ×3 with no evidence of aphasia or dysarthria.  The patient's memory testing, fund of knowledge and concentration are all within normal limits.  The patient's cranial nerves 2, 3, 4, 5, 6, 7, 8, 9, 10, 11 and 12 are all within normal limits.  The patient's motor testing shows normal tone, bulk, and power in the right upper and lower extremities and the patient has 5-55 strength in left upper and left lower extremity.  The patient's sensory testing is intact to light touch in the upper and lower extremities bilaterally.  The patient's cerebellar testing is intact in the upper and lower extremities bilaterally.  The patient's station and gait were not tested as the patient is a high fall risk.  The patient's reflexes are 1+ in the upper and lower extremities and symmetrical.    Relevant " Results        NIH Stroke Scale  1A. Level of Consciousness: Alert, Keenly Responsive  1B. Ask Month and Age: Both Questions Right  1C. Blink Eyes & Squeeze Hands: Performs Both Tasks  2. Best Gaze: Normal  3. Visual: No Visual Loss  4. Facial Palsy: Normal Symmetrical Movements  5A. Motor - Left Arm: No Drift  5B. Motor - Right Arm: No Drift  6A. Motor - Left Leg: Drift  6B. Motor - Right Leg: No Drift  7. Limb Ataxia: Absent  8. Sensory Loss: Normal  9. Best Language: No Aphasia  10. Dysarthria: Normal  11. Extinction and Inattention: No Abnormality  NIH Stroke Scale: 1           [place] Coma Scale  Best Eye Response: Spontaneous  Best Verbal Response: Oriented  Best Motor Response: Follows commands  [place] Coma Scale Score: 15            Scheduled medications  dexAMETHasone, 10 mg, oral, q6h  diazePAM, 10 mg, oral, Once  diazePAM, 10 mg, oral, Once      Continuous medications     PRN medications      Results for orders placed or performed during the hospital encounter of 10/18/24 (from the past 96 hours)   POCT GLUCOSE   Result Value Ref Range    POCT Glucose 87 74 - 99 mg/dL   CBC and Auto Differential   Result Value Ref Range    WBC 8.1 4.4 - 11.3 x10*3/uL    nRBC 0.0 0.0 - 0.0 /100 WBCs    RBC 3.97 (L) 4.00 - 5.20 x10*6/uL    Hemoglobin 10.5 (L) 12.0 - 16.0 g/dL    Hematocrit 33.3 (L) 36.0 - 46.0 %    MCV 84 80 - 100 fL    MCH 26.4 26.0 - 34.0 pg    MCHC 31.5 (L) 32.0 - 36.0 g/dL    RDW 16.7 (H) 11.5 - 14.5 %    Platelets 389 150 - 450 x10*3/uL    Neutrophils % 78.8 40.0 - 80.0 %    Immature Granulocytes %, Automated 0.4 0.0 - 0.9 %    Lymphocytes % 14.3 13.0 - 44.0 %    Monocytes % 5.4 2.0 - 10.0 %    Eosinophils % 0.6 0.0 - 6.0 %    Basophils % 0.5 0.0 - 2.0 %    Neutrophils Absolute 6.41 1.20 - 7.70 x10*3/uL    Immature Granulocytes Absolute, Automated 0.03 0.00 - 0.70 x10*3/uL    Lymphocytes Absolute 1.16 (L) 1.20 - 4.80 x10*3/uL    Monocytes Absolute 0.44 0.10 - 1.00 x10*3/uL    Eosinophils Absolute 0.05  0.00 - 0.70 x10*3/uL    Basophils Absolute 0.04 0.00 - 0.10 x10*3/uL   Comprehensive metabolic panel   Result Value Ref Range    Glucose 85 74 - 99 mg/dL    Sodium 136 136 - 145 mmol/L    Potassium 4.2 3.5 - 5.3 mmol/L    Chloride 100 98 - 107 mmol/L    Bicarbonate 27 21 - 32 mmol/L    Anion Gap 13 10 - 20 mmol/L    Urea Nitrogen 18 6 - 23 mg/dL    Creatinine 0.64 0.50 - 1.05 mg/dL    eGFR >90 >60 mL/min/1.73m*2    Calcium 9.5 8.6 - 10.3 mg/dL    Albumin 3.9 3.4 - 5.0 g/dL    Alkaline Phosphatase 40 33 - 110 U/L    Total Protein 6.9 6.4 - 8.2 g/dL    AST 17 9 - 39 U/L    Bilirubin, Total 0.8 0.0 - 1.2 mg/dL    ALT 10 7 - 45 U/L   Troponin I, High Sensitivity   Result Value Ref Range    Troponin I, High Sensitivity <3 0 - 13 ng/L   Protime-INR   Result Value Ref Range    Protime 13.0 (H) 9.8 - 12.8 seconds    INR 1.2 (H) 0.9 - 1.1   APTT   Result Value Ref Range    aPTT 31 27 - 38 seconds          I have personally reviewed the following imaging results CT brain attack angio head and neck W and WO IV contrast    Result Date: 10/18/2024  Interpreted By:  Esperanza Pereira, STUDY: CT BRAIN ATTACK ANGIO HEAD AND NECK W AND WO IV CONTRAST;  10/18/2024 1:11 pm   INDICATION: Signs/Symptoms:left side drift.     COMPARISON: Same day unenhanced head CT which is reported separately.   ACCESSION NUMBER(S): [id number]   ORDERING CLINICIAN: CHRISTIANO KILGORE   TECHNIQUE: 100 ML of Omnipaque 350 was administered intravenously and axial images of the head and neck were acquired.  Coronal, sagittal, and 3-D reconstructions were provided for review. 3D reconstructions were performed utilizing an independent workstation.   FINDINGS:     CTA HEAD FINDINGS:   Anterior circulation: The intracranial segments of the internal carotid arteries and the proximal anterior and middle cerebral arteries are patent. There is fusiform dilatation of the cavernous segment of the left ICA measuring approximately 4.5 mm.   Posterior circulation:  Bilateral intracranial vertebral arteries, vertebrobasilar junction, basilar artery and proximal posterior cerebral arteries are patent.   The source images demonstrate multifocal areas of vasogenic edema with local mass effect in the cerebral hemispheres bilaterally. There are heterogeneously enhancing masses in the right frontoparietal lobe and left frontal lobe in particular.   CTA neck:     The aortic arch and arch vessels are somewhat degraded by artifact. There is a three-vessel aortic arch.   Carotid vessels:   The proximal common carotid arteries are degraded by artifact but otherwise appear patent. The carotid bifurcations and cervical segments of the ICAs demonstrate no measurable stenosis. There is tortuosity and ectasia of the distal cervical ICA on the right.   Vertebral vessels:   The left vertebral artery is dominant. There is tortuosity of the vessels bilaterally without measurable stenosis.   There is a partially imaged large mass involving the included portion of the mediastinum and right upper lobe. There is at least partial encasement of the aortic arch, proximal arch vessels, and right greater than left mainstem bronchi. The right pulmonary artery is at least markedly narrowed. There are innumerable nodular and masslike densities more diffusely within the included portion of the right upper lobe. There is a right-sided pleural effusion.   There are degenerative changes of the cervical spine.   There is scattered soft tissue emphysema which may be vascular in due to contrast injection.   There are nonspecific cervical lymph nodes.           1. No definitive evidence for hemodynamically significant stenosis or proximal large branch vessel cutoff on CTA of the head or neck. There is fusiform dilatation of the cavernous segment of the left ICA. 2. Heterogeneously enhancing masses compatible with brain metastasis with associated vasogenic edema and mass effect but no midline shift. MRI brain with  contrast is recommended for further evaluation. 3. Partially imaged large mass within the superior mediastinum and right upper lobe with marked narrowing of the right pulmonary artery.   MACRO: None   Signed by: Esperanza Pereira 10/18/2024 1:39 PM Dictation workstation:   [id number]    CT brain attack head wo IV contrast    Result Date: 10/18/2024  Interpreted By:  Schoenberger, Joseph, STUDY: CT BRAIN ATTACK HEAD WO IV CONTRAST;  10/18/2024 1:00 pm   INDICATION: Signs/Symptoms:Stroke Evaluation.     COMPARISON: None.   ACCESSION NUMBER(S): [id number]   ORDERING CLINICIAN: CHRISTIANO KILGORE   TECHNIQUE: Noncontrast axial CT scan of head was performed. Angled reformats in brain and bone windows were generated. The images were reviewed in bone, brain, blood and soft tissue windows.   FINDINGS: CSF Spaces: The ventricles, sulci and basal cisterns are within normal limits. There is no extraaxial fluid collection.   Parenchyma: There are 2 foci of vasogenic edema with relatively mild mass effect to include somewhat effacement of adjacent sulci but no significant effacement of lateral ventricles. 1 is in the posterior aspect of the right frontal lobe and 1 is in the left frontal lobe. They both have areas of soft tissue attenuation and are most consistent with metastatic lesions. These are new findings compared to the prior examination. There are several cortical calcifications in the brain. The largest is in the left frontal lobe is unchanged. Several tiny cortical calcifications are new from the prior. There of uncertain significance.. No other definite masslike lesions are noted. The findings above warrants further evaluation with contrast enhanced MRI of the brain. No definite acute intracranial hemorrhage.   Calvarium: The calvarium itself is unremarkable. In the midline of the clivus there is a lucency posterior to the nasopharynx with well-defined sclerotic borders which is not different than the prior and does  not extend to the inner table of the posterior fossa is likely developmental.   Paranasal sinuses and mastoids: Visualized paranasal sinuses and mastoids are clear.       In the interval since the prior exam there are 2 new foci of vasogenic edema which suggest metastatic disease and warrant further evaluation with contrast-enhanced brain MRI.   There is several cortical calcifications. The largest is in the posterior left frontal images and is unchanged. There are several tiny calcifications which are new from the prior and of uncertain significance.     No evidence of intracranial hemorrhage or displaced skull fracture.   MACRO: Joseph Schoenberger discussed the significance and urgency of this critical finding by telephone with  CHRISTIANO KILGORE on 10/18/2024 at 1:20 pm.  (**-RCF-**) Findings:  BAT.     Signed by: Joseph Schoenberger 10/18/2024 1:20 PM Dictation workstation:   [id number]    XR chest 2 views    Result Date: 9/27/2024  Interpreted By:  Dilshad Frank, STUDY: XR CHEST 2 VIEWS;  9/26/2024 3:06 pm   INDICATION: Signs/Symptoms:Folow up R Pleural Effusion.   ,C78.01 Secondary malignant neoplasm of right lung (Multi),C78.02 Secondary malignant neoplasm of left lung (Multi)   COMPARISON: 08/25/2024   ACCESSION NUMBER(S): [id number]   ORDERING CLINICIAN: ANDREA CRUMP   FINDINGS:         CARDIOMEDIASTINAL SILHOUETTE: Cardiomediastinal silhouette is normal in size and configuration.   LUNGS: Redemonstration of a moderate-sized right pleural effusion. Redemonstration of right parahilar mass. Small left pleural effusion present. The lungs are hyperinflated. Overall there is no change from the prior.   ABDOMEN: No remarkable upper abdominal findings.   BONES: No acute osseous changes.       1. Redemonstration of dense airspace disease in the right parahilar region as seen on prior imaging. 2. Redemonstration of moderate sized right pleural effusion and a small left pleural effusion, similar to the prior.        MACRO: None   Signed by: Dilshad Frank 9/27/2024 5:44 PM Dictation workstation:   [id number]    ECG 12 lead (Ancillary Performed)    Result Date: 9/26/2024  Normal sinus rhythm ST & T wave abnormality, consider anterior ischemia Abnormal ECG No previous ECGs available Confirmed by Ramicone, James ([id number]) on 9/26/2024 5:26:51 PM    Transthoracic Echo (TTE) Limited    Result Date: 9/22/2024   [facility], [address] [place] [address]           Tel [phone] and Fax [phone] TRANSTHORACIC ECHOCARDIOGRAM REPORT  Patient Name:      MINNIE BENITO Tijerina Physician:    [id number] Chas Jorgensen MD Study Date:        9/20/2024            Ordering Provider:    [id number] ANDREA CRUMP MRN/PID:           [id number]             Fellow: Accession#:        [id number]         Nurse: Date of Birth/Age: 1970 / 54 years Sonographer:          Racquel Whalen                                                               [facility] Gender:            F                    Additional Staff: Height:            160.02 cm            Admit Date: Weight:            56.70 kg             Admission Status: BSA / BMI:         1.58 m2 / 22.14      Encounter#:           [id number]                    kg/m2 Blood Pressure:    90/66 mmHg           Department Location:  [facility] [place] [facility] Study Type:    TRANSTHORACIC ECHO (TTE) LIMITED Diagnosis/ICD: Pleural Effusion-J90; 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 CPT Code:      Echo Limited-18179  Study Detail: The following Echo studies were performed: 2D, M-Mode, Doppler and               color flow.  PHYSICIAN INTERPRETATION: Left Ventricle: Left ventricular ejection fraction is normal, calculated by Aguillon's biplane at 57%.  There are no regional left ventricular wall motion abnormalities. The left ventricular cavity size is decreased. Left ventricular diastolic filling was not assessed. Left Atrium: The left atrium was not assessed. Right Ventricle: The right ventricle was not assessed. Right ventricular systolic function not assessed. Right Atrium: The right atrium was not assessed. Aortic Valve: The aortic valve is trileaflet. There is no evidence of aortic valve regurgitation. Mitral Valve: The mitral valve is normal in structure. There is no evidence of mitral valve regurgitation. Tricuspid Valve: The tricuspid valve is structurally normal. No evidence of tricuspid regurgitation. Pulmonic Valve: The pulmonic valve is not well visualized. The pulmonic valve regurgitation was not well visualized. Pericardium: Small pericardial effusion. The pericardial effusion appears to contain fibrinous material. There is no evidence of cardiac tamponade. Aorta: The aortic root is normal. Systemic Veins: The inferior vena cava was not well visualized. In comparison to the previous echocardiogram(s): Compared with study dated 8/25/2024,. Small pericardial effusion with fibrinous material; no cardiac tamponade.  CONCLUSIONS:  1. Left ventricular ejection fraction is normal, calculated by Aguillon's biplane at 57%.  2. Small pericardial effusion with fibrinous material; no cardiac tamponade. QUANTITATIVE DATA SUMMARY:  2D MEASUREMENTS:          Normal Ranges: IVSd:            1.15 cm  (0.6-1.1cm) LVPWd:           0.98 cm  (0.6-1.1cm) LVIDd:           3.71 cm  (3.9-5.9cm) LVIDs:           2.78 cm LV Mass Index:   78 g/m2 LVEDV Index:     23 ml/m2 LV % FS          25.0 %  AORTA MEASUREMENTS:         Normal Ranges: Ao STJ, d:          2.90 cm (1.7-3.4cm) Asc Ao, d:          2.90 cm (2.1-3.4cm)  LV SYSTOLIC FUNCTION BY 2D PLANIMETRY (MOD):                      Normal Ranges: EF-A4C View:    54 % (>=55%) EF-A2C View:    60 % EF-Biplane:     57 % LV EF  Reported: 57 %  AORTIC VALVE:          Normal Ranges: LVOT Diameter: 1.95 cm (1.8-2.4cm)  [id number] Chas Jorgensen MD Electronically signed on 9/22/2024 at 10:11:49 AM  ** Final **        Assessment/Plan   Assessment & Plan  Left-sided weakness    Brain mass      Impression: The patient is a 54-year-old female with a history of breast cancer for many years who presents with difficulties with coordination, speech problems, left hand numbness and left-sided weakness.  Her neurological examination is abnormal and noted above.  The patient is not an IV TNK candidate as she appears to have multiple brain mets.  The differential diagnosis for her left-sided weakness includes brain metastasis, seizure, cerebral infarction and abscess which I doubt.    Plan: The patient needs an MRI of the brain with and without gadolinium.  I would like to start the patient on Decadron at 10 mg p.o./every 6 hours.  The patient needs a neurosurgery consult.  The patient needs DVT prophylaxis.  The patient needs neurochecks as per protocol.  I will send the note to Dr. Queen.  Thank you very much for sending me this very interesting consultation.  I discussed all these issues in detail with the patient and answered all their questions.  I will continue to follow the patient while they are in the hospital.  The patient is pending transfer to [facility] [place].  The patient needs follow-up with their primary care doctor within 2 weeks of discharge.  On discharge, the patient will follow up with me in the office in 4 months.      Jordan Matthews MD

## 2024-10-18 NOTE — PROGRESS NOTES
PHARMACY STROKE RESPONSE      Patient Name: Ayesha Nova  MRN: [id number]  Location: [place] [address]    Patient Weight (kg):   Wt Readings from Last 1 Encounters:   10/18/24 50.5 kg (111 lb 5.3 oz)        An acute Brain Attack has been activated, pharmacy participated in multidisciplinary team bedside response for Ayesha Nova.  Contraindications for fibrinolytic therapy have been reviewed by pharmacy and any issues relating to medication therapy have been discussed directly with the provider(s) caring for this patient.     Pharmacy aided in the bedside response for fibrinolytic therapy. Patient did not receive fibrinolysis.    Orders Placed This Encounter      dexAMETHasone (Decadron) tablet 10 mg      diazePAM (Valium) tablet 10 mg      diazePAM (Valium) tablet 10 mg      iohexol (OMNIPaque) 350 mg iodine/mL solution 100 mL      Thank you for allowing me to take part in the care of this patient.     Shruthi Iniguez, PharmD  10/18/2024  3:09 PM         References:    [facility] Stroke Tools   [facility] IV Thrombolysis Checklist

## 2024-10-18 NOTE — H&P
History Of Present Illness  Ayesha Nova is a 54 y.o. female with past medical history significant for stage IV breast cancer s/p right lumpectomy, pericardial effusion s/p pericardial window in May and early September of 2024, and lung cancer s/p partial pulmonectomy who presents to the ED for further evaluation of left-sided weakness.  Per spouse, does states she had slurred speech earlier today which has since resolved and patient is back to baseline.  Patient reports left-sided hand, leg, and facial numbness/tingling.  Also reports weakness to her left arm and left leg today.  States the symptoms started acutely this morning and have been progressively getting better, states they have resolved currently in the ED.  Does admit to generalized weakness over the past couple weeks and decreased appetite, however states both have improved.  States she does not really follow with oncology for her cancer at the moment.  Denies headaches or vision changes.  Denies confusion or changes in mentation.  Does admit to mild shortness of breath, however states this is chronic.  Does admit to a mild dry cough recently.  Denies chest pains, abdominal pain or nausea, urinary/bowel changes, or fever/chills.  Denies smoking, drinking, or drug use.    ED course: On arrival to the ED, patient is afebrile and hemodynamically stable with SpO2 98% on room air.  Glucose 85.  Electrolytes WNL.  Initial troponin negative.  WBC 8.1.  Hemoglobin 10.5/hematocrit 33.3.  CTA head/neck and CT brain results noted below.  Shows fusiform dilation of cavernous segment of left ICA and heterogeneously enhancing mass compatible with brain metastasis with associated vasogenic edema and mass effect but no midline shift. NIHSS 3 in the ED, left arm and left leg drift and mild/moderate sensory loss.  ED provider discussed with neurosurgery, Dr. Montgomery, who [place] [facility] [place], patient declined transfer.    EKG  (interpreted by ED physician): Normal sinus rhythm, rate of 100, , QRS 68, QTc 447, no T wave inversions or ST elevations or depressions to suggest ischemia.    Admitting provider is Dr. Miranda      Past Medical History  Past Medical History:   Diagnosis Date    Personal history of malignant neoplasm of breast     History of malignant neoplasm of breast     Surgical History  Past Surgical History:   Procedure Laterality Date    HYSTERECTOMY  02/29/2016    Hysterectomy    OTHER SURGICAL HISTORY  04/20/2022    Breast reduction    OTHER SURGICAL HISTORY  04/20/2022    Lumpectomy    OTHER SURGICAL HISTORY  04/20/2022    Lung wedge resection     Social History  She reports that she quit smoking about 4 years ago. Her smoking use included cigarettes. She has never used smokeless tobacco. She reports that she does not drink alcohol and does not use drugs.    Family History  Family History   Problem Relation Name Age of Onset    Dementia Mother      Heart attack Father      Other (cabg) Father       Allergies  Vancomycin, Morphine, Zofran odt [ondansetron], and Sulfa (sulfonamide antibiotics)    Review of Systems  10 point view system negative except as in above in HPI    Physical Exam  Constitutional:       Appearance: Normal appearance.   HENT:      Head: Normocephalic and atraumatic.      Mouth/Throat:      Mouth: Mucous membranes are moist.      Pharynx: Oropharynx is clear.   Eyes:      Extraocular Movements: Extraocular movements intact.      Conjunctiva/sclera: Conjunctivae normal.      Pupils: Pupils are equal, round, and reactive to light.   Cardiovascular:      Rate and Rhythm: Normal rate and regular rhythm.      Pulses: Normal pulses.      Heart sounds: Normal heart sounds.   Pulmonary:      Effort: Pulmonary effort is normal. No respiratory distress.      Breath sounds: No wheezing.      Comments: Diminished breath sounds bilaterally  Abdominal:      General: Bowel sounds are normal.      Palpations:  "Abdomen is soft.      Tenderness: There is no abdominal tenderness.   Musculoskeletal:         General: Normal range of motion.      Right lower leg: No edema.      Left lower leg: No edema.   Skin:     General: Skin is warm and dry.   Neurological:      General: No focal deficit present.      Mental Status: She is alert and oriented to person, place, and time.   Psychiatric:         Mood and Affect: Mood normal.         Behavior: Behavior normal.       Last Recorded Vitals  Blood pressure 102/66, pulse 91, temperature 36.6 °C (97.9 °F), resp. rate 14, height 1.575 m (5' 2\"), weight 50.5 kg (111 lb 5.3 oz), SpO2 98%.    Relevant Results  Results for orders placed or performed during the hospital encounter of 10/18/24 (from the past 24 hours)   POCT GLUCOSE   Result Value Ref Range    POCT Glucose 87 74 - 99 mg/dL   CBC and Auto Differential   Result Value Ref Range    WBC 8.1 4.4 - 11.3 x10*3/uL    nRBC 0.0 0.0 - 0.0 /100 WBCs    RBC 3.97 (L) 4.00 - 5.20 x10*6/uL    Hemoglobin 10.5 (L) 12.0 - 16.0 g/dL    Hematocrit 33.3 (L) 36.0 - 46.0 %    MCV 84 80 - 100 fL    MCH 26.4 26.0 - 34.0 pg    MCHC 31.5 (L) 32.0 - 36.0 g/dL    RDW 16.7 (H) 11.5 - 14.5 %    Platelets 389 150 - 450 x10*3/uL    Neutrophils % 78.8 40.0 - 80.0 %    Immature Granulocytes %, Automated 0.4 0.0 - 0.9 %    Lymphocytes % 14.3 13.0 - 44.0 %    Monocytes % 5.4 2.0 - 10.0 %    Eosinophils % 0.6 0.0 - 6.0 %    Basophils % 0.5 0.0 - 2.0 %    Neutrophils Absolute 6.41 1.20 - 7.70 x10*3/uL    Immature Granulocytes Absolute, Automated 0.03 0.00 - 0.70 x10*3/uL    Lymphocytes Absolute 1.16 (L) 1.20 - 4.80 x10*3/uL    Monocytes Absolute 0.44 0.10 - 1.00 x10*3/uL    Eosinophils Absolute 0.05 0.00 - 0.70 x10*3/uL    Basophils Absolute 0.04 0.00 - 0.10 x10*3/uL   Comprehensive metabolic panel   Result Value Ref Range    Glucose 85 74 - 99 mg/dL    Sodium 136 136 - 145 mmol/L    Potassium 4.2 3.5 - 5.3 mmol/L    Chloride 100 98 - 107 mmol/L    Bicarbonate 27 21 " - 32 mmol/L    Anion Gap 13 10 - 20 mmol/L    Urea Nitrogen 18 6 - 23 mg/dL    Creatinine 0.64 0.50 - 1.05 mg/dL    eGFR >90 >60 mL/min/1.73m*2    Calcium 9.5 8.6 - 10.3 mg/dL    Albumin 3.9 3.4 - 5.0 g/dL    Alkaline Phosphatase 40 33 - 110 U/L    Total Protein 6.9 6.4 - 8.2 g/dL    AST 17 9 - 39 U/L    Bilirubin, Total 0.8 0.0 - 1.2 mg/dL    ALT 10 7 - 45 U/L   Troponin I, High Sensitivity   Result Value Ref Range    Troponin I, High Sensitivity <3 0 - 13 ng/L   Protime-INR   Result Value Ref Range    Protime 13.0 (H) 9.8 - 12.8 seconds    INR 1.2 (H) 0.9 - 1.1   APTT   Result Value Ref Range    aPTT 31 27 - 38 seconds      CT brain attack angio head and neck W and WO IV contrast    Result Date: 10/18/2024  Interpreted By:  Esperanza Pereira, STUDY: CT BRAIN ATTACK ANGIO HEAD AND NECK W AND WO IV CONTRAST;  10/18/2024 1:11 pm   INDICATION: Signs/Symptoms:left side drift.     COMPARISON: Same day unenhanced head CT which is reported separately.   ACCESSION NUMBER(S): [id number]   ORDERING CLINICIAN: CHRISTIANO KILGORE   TECHNIQUE: 100 ML of Omnipaque 350 was administered intravenously and axial images of the head and neck were acquired.  Coronal, sagittal, and 3-D reconstructions were provided for review. 3D reconstructions were performed utilizing an independent workstation.   FINDINGS:     CTA HEAD FINDINGS:   Anterior circulation: The intracranial segments of the internal carotid arteries and the proximal anterior and middle cerebral arteries are patent. There is fusiform dilatation of the cavernous segment of the left ICA measuring approximately 4.5 mm.   Posterior circulation: Bilateral intracranial vertebral arteries, vertebrobasilar junction, basilar artery and proximal posterior cerebral arteries are patent.   The source images demonstrate multifocal areas of vasogenic edema with local mass effect in the cerebral hemispheres bilaterally. There are heterogeneously enhancing masses in the right frontoparietal  lobe and left frontal lobe in particular.   CTA neck:     The aortic arch and arch vessels are somewhat degraded by artifact. There is a three-vessel aortic arch.   Carotid vessels:   The proximal common carotid arteries are degraded by artifact but otherwise appear patent. The carotid bifurcations and cervical segments of the ICAs demonstrate no measurable stenosis. There is tortuosity and ectasia of the distal cervical ICA on the right.   Vertebral vessels:   The left vertebral artery is dominant. There is tortuosity of the vessels bilaterally without measurable stenosis.   There is a partially imaged large mass involving the included portion of the mediastinum and right upper lobe. There is at least partial encasement of the aortic arch, proximal arch vessels, and right greater than left mainstem bronchi. The right pulmonary artery is at least markedly narrowed. There are innumerable nodular and masslike densities more diffusely within the included portion of the right upper lobe. There is a right-sided pleural effusion.   There are degenerative changes of the cervical spine.   There is scattered soft tissue emphysema which may be vascular in due to contrast injection.   There are nonspecific cervical lymph nodes.           1. No definitive evidence for hemodynamically significant stenosis or proximal large branch vessel cutoff on CTA of the head or neck. There is fusiform dilatation of the cavernous segment of the left ICA. 2. Heterogeneously enhancing masses compatible with brain metastasis with associated vasogenic edema and mass effect but no midline shift. MRI brain with contrast is recommended for further evaluation. 3. Partially imaged large mass within the superior mediastinum and right upper lobe with marked narrowing of the right pulmonary artery.   MACRO: None   Signed by: Esperanza Pereira 10/18/2024 1:39 PM Dictation workstation:   [id number]    CT brain attack head wo IV contrast    Result Date:  10/18/2024  Interpreted By:  Schoenberger, Joseph, STUDY: CT BRAIN ATTACK HEAD WO IV CONTRAST;  10/18/2024 1:00 pm   INDICATION: Signs/Symptoms:Stroke Evaluation.     COMPARISON: None.   ACCESSION NUMBER(S): [id number]   ORDERING CLINICIAN: CHRISTIANO KILGORE   TECHNIQUE: Noncontrast axial CT scan of head was performed. Angled reformats in brain and bone windows were generated. The images were reviewed in bone, brain, blood and soft tissue windows.   FINDINGS: CSF Spaces: The ventricles, sulci and basal cisterns are within normal limits. There is no extraaxial fluid collection.   Parenchyma: There are 2 foci of vasogenic edema with relatively mild mass effect to include somewhat effacement of adjacent sulci but no significant effacement of lateral ventricles. 1 is in the posterior aspect of the right frontal lobe and 1 is in the left frontal lobe. They both have areas of soft tissue attenuation and are most consistent with metastatic lesions. These are new findings compared to the prior examination. There are several cortical calcifications in the brain. The largest is in the left frontal lobe is unchanged. Several tiny cortical calcifications are new from the prior. There of uncertain significance.. No other definite masslike lesions are noted. The findings above warrants further evaluation with contrast enhanced MRI of the brain. No definite acute intracranial hemorrhage.   Calvarium: The calvarium itself is unremarkable. In the midline of the clivus there is a lucency posterior to the nasopharynx with well-defined sclerotic borders which is not different than the prior and does not extend to the inner table of the posterior fossa is likely developmental.   Paranasal sinuses and mastoids: Visualized paranasal sinuses and mastoids are clear.       In the interval since the prior exam there are 2 new foci of vasogenic edema which suggest metastatic disease and warrant further evaluation with contrast-enhanced brain  MRI.   There is several cortical calcifications. The largest is in the posterior left frontal images and is unchanged. There are several tiny calcifications which are new from the prior and of uncertain significance.     No evidence of intracranial hemorrhage or displaced skull fracture.   MACRO: Joseph Schoenberger discussed the significance and urgency of this critical finding by telephone with  CHRISTIANO KILGORE on 10/18/2024 at 1:20 pm.  (**-RCF-**) Findings:  BAT.     Signed by: Joseph Schoenberger 10/18/2024 1:20 PM Dictation workstation:   [id number]     Assessment/Plan   Assessment & Plan  Brain mass    Left-sided weakness      Ayesha Nova is a 54 y.o. female with past medical history significant for stage IV breast cancer s/p right lumpectomy, pericardial effusion s/p pericardial window in May and early September of 2024, and lung cancer s/p partial pulmonectomy who presents to the ED for further evaluation of left-sided weakness.  Per spouse, does states she had slurred speech earlier today which has since resolved and patient is back to baseline.  Patient reports left-sided hand, leg, and facial numbness/tingling.  Also reports weakness to her left arm and left leg today.  States the symptoms started acutely this morning and have been progressively getting better, states they have resolved currently in the ED.     #Brain mass with edema, no midline shift noted on CT  #Left-sided weakness/numbness, resolved  #Slurred speech, resolved  Concern for TIA versus stroke, however symptoms likely related to brain mass  Admit as inpatient with telemetry monitoring  Neurosurgery consult  Neurology consult  MRI head pending  Decadron every 6 hours  Aspirin 81 mg daily  Started on Lipitor 40 mg nightly  Q 4 neurochecks  Q 4 vitals  Regular diet  CBC, BMP in the a.m.  Tylenol as needed  Breathing treatments as needed  Hemoglobin A1c, lipid panel, BNP added onto lab work  Chest x-ray ordered  PT/OT/speech for further  evaluation and treatment    #Anemia  Stable, no current signs of bleeding, monitor with a.m. labs    Continue home medications as appropriate    Chronic conditions:  Breast cancer, lung cancer, pericardial effusions    #DVT prophylaxis  Lovenox  Ambulation       I spent 60 minutes in the professional and overall care of this patient.    Flaquito Huggins PA-C

## 2024-10-18 NOTE — ED PROVIDER NOTES
History/Exam limitations: none.   Additional history was obtained from patient and spouse/SO.    HPI:       Ayesha Nova is a 54 y.o. with a history of stage IV breast cancer, pericardial effusion s/p pericardial window in May and September of this year, PSH notable for mastectomy, partial pulm and ectomy, who presents for left-sided weakness.  Per spouse she did have slurred speech, that has resolved.  Additional symptoms include left-sided hand and face numbness.      Denies chest pain, new shortness of breath, nausea or vomiting, fevers, fainting     Last Known Well Time: 1215        ------------------------------------------------------------------------------------------------------------------------------------------     Physical Exam:    ED Triage Vitals   Temp Pulse Resp BP   -- -- -- --      SpO2 Temp src Heart Rate Source Patient Position   -- -- -- --      BP Location FiO2 (%)     -- --          Gen: Not in acute distress  Head/Neck: NCAT  Eyes: Anicteric sclerae, noninjected conjunctivae  Nose: No rhinorrhea  Mouth:  MMM  Heart: Regular rate and rhythm w/ no murmurs, rubs, gallops  Lungs: CTAB w/o wheezes, rales, rhonchi, no increased work of breathing  Abdomen: Soft, NT/ND  Musculoskeletal: No deformities  Extremities: No edema.  Neurologic: Please see below for NIHSS  Skin: No rashes noted  Psychological: Calm        Interval: Baseline  Time: 3:55 PM  Person Administering Scale: Yannick Luna DO     1a  Level of consciousness: 0=alert; keenly responsive   1b. LOC questions:  0=Performs both tasks correctly   1c. LOC commands: 0=Performs both tasks correctly   2.  Best Gaze: 0=normal   3. Visual: 0=No visual loss   4. Facial Palsy: 0=Normal symmetric movement   5a. Motor left arm: 1=Drift, limb holds 90 (or 45) degrees but drifts down before full 10 seconds: does not hit bed   5b.  Motor right arm: 0=No drift, limb holds 90 (or 45) degrees for full 10 seconds   6a. motor left le=Drift, limb holds 90  (or 45) degrees but drifts down before full 10 seconds: does not hit bed   6b  Motor right le=No drift, limb holds 90 (or 45) degrees for full 10 seconds   7. Limb Ataxia: 0=Absent   8.  Sensory: 1=Mild to moderate sensory loss; patient feels pinprick is less sharp or is dull on the affected side; there is a loss of superficial pain with pinprick but patient is aware She is being touched   9. Best Language:  0=No aphasia, normal   10. Dysarthria: 0=Normal   11. Extinction and Inattention: 0=No abnormality   12. Distal motor function: 0=Normal     Total:   3         VAN: VAN: Negative     ------------------------------------------------------------------------------------------------------------------------------------------     Medical Decision Makin-year-old female presented to the ED for left-sided weakness and hand numbness.  On arrival afebrile normotensive regular heart rate, regular respiration rate, eupneic, satting well room air.  Brain attack called, patient taken directly to CT scanner.  ED Course as of 10/18/24 1555   Fri Oct 18, 2024   1259 Discussed with Dr. Matthews, who recommended discussing with CT surgery if recent pericardial window is absolute contraindication.  [CB]   1359 Comprehensive metabolic panel  Normoglycemic, no acute electrolyte or hepatorenal abnormality [CB]   1359 CBC and Auto Differential(!)  No acute leukocytosis, leukopenia, thrombocytosis, thrombocytopenia, or anemia [CB]   1359 Protime-INR(!)  No coagulopathy [CB]   1359 CT brain attack angio head and neck W and WO IV contrast  1. No definitive evidence for hemodynamically significant stenosis or  proximal large branch vessel cutoff on CTA of the head or neck. There  is fusiform dilatation of the cavernous segment of the left ICA.  2. Heterogeneously enhancing masses compatible with brain metastasis  with associated vasogenic edema and mass effect but no midline shift.  MRI brain with contrast is recommended for  further evaluation.  3. Partially imaged large mass within the superior mediastinum and  right upper lobe with marked narrowing of the right pulmonary artery.   [CB]   1400 CT brain attack head wo IV contrast  In the interval since the prior exam there are 2 new foci of  vasogenic edema which suggest metastatic disease and warrant further  evaluation with contrast-enhanced brain MRI.      There is several cortical calcifications. The largest is in the  posterior left frontal images and is unchanged. There are several  tiny calcifications which are new from the prior and of uncertain  significance.          No evidence of intracranial hemorrhage or displaced skull fracture.   [CB]   1421 Discussed with neurosurgery, Dr. Montgomery, who recommends transfer to [facility] [place] [CB]   1422 Discussed with Dr. Matthews who recommended MRI w/wo, decadron, valium per-MRI [CB]   1453 Troponin I, High Sensitivity [CB]   1459 Discussed with [facility] [place], Dr. Nesbitt, Neurosurgery, who recommended ED to ED transfer. Will be met by Dr. Skinner in ED.  [CB]   1504 Troponin I, High Sensitivity  Not elevated doubt ACS [CB]   1554 Patient updated, declined transfer [CB]      ED Course User Index  [CB] Yannick Luna, DO         Diagnoses as of 10/18/24 1555   Stroke-like episode   Brain mass        EKG interpreted by myself: NSR rate about 100  QRS 68 QTc 447 no T wave inversions or ST elevations or depressions to suggest ischemia     Independent Interpretation of Studies: I independently interpreted the CT head and see No obvious evidence of intracranial hemorrhage and Evidence of intracranial mass    Chronic Medical Conditions Significantly Affecting Care: Stage IV breast cancer    Social Determinants of Health Significantly Affecting Care:  none       Discussion of Management with Other Providers:   I discussed the patient/results with: DR. Matthews neurology and the admitting team as well as Dr. Montgomery and Dr. Nesbitt.       IV  Thrombolysis:    No Thrombolysis contraindication reason: Presence of intra-axial intracranial neoplasm     Shared decision making for disposition  Patient and/or patient´s representative was counseled regarding labs, imaging, likely diagnosis. All questions were answered. Recommendation was made   for Admission given the need for further escalation of care to inpatient management. Patient agreed and was admitted in stable condition. Admitting team was notified of any pending labs or imaging to ensure continuity of care.     Procedure  Procedures          Yannick Luna DO  Resident  10/18/24 1530       DO Tim Barton  10/18/24 1558

## 2024-10-19 LAB
ANION GAP SERPL CALC-SCNC: 14 MMOL/L (ref 10–20)
BUN SERPL-MCNC: 17 MG/DL (ref 6–23)
CALCIUM SERPL-MCNC: 9.3 MG/DL (ref 8.6–10.3)
CHLORIDE SERPL-SCNC: 101 MMOL/L (ref 98–107)
CO2 SERPL-SCNC: 27 MMOL/L (ref 21–32)
CREAT SERPL-MCNC: 0.58 MG/DL (ref 0.5–1.05)
EGFRCR SERPLBLD CKD-EPI 2021: >90 ML/MIN/1.73M*2
ERYTHROCYTE [DISTWIDTH] IN BLOOD BY AUTOMATED COUNT: 16.8 % (ref 11.5–14.5)
GLUCOSE BLD MANUAL STRIP-MCNC: 147 MG/DL (ref 74–99)
GLUCOSE SERPL-MCNC: 87 MG/DL (ref 74–99)
HCT VFR BLD AUTO: 31.3 % (ref 36–46)
HGB BLD-MCNC: 9.9 G/DL (ref 12–16)
MCH RBC QN AUTO: 26.4 PG (ref 26–34)
MCHC RBC AUTO-ENTMCNC: 31.6 G/DL (ref 32–36)
MCV RBC AUTO: 84 FL (ref 80–100)
NRBC BLD-RTO: 0 /100 WBCS (ref 0–0)
PLATELET # BLD AUTO: 365 X10*3/UL (ref 150–450)
POTASSIUM SERPL-SCNC: 3.8 MMOL/L (ref 3.5–5.3)
RBC # BLD AUTO: 3.75 X10*6/UL (ref 4–5.2)
SODIUM SERPL-SCNC: 138 MMOL/L (ref 136–145)
WBC # BLD AUTO: 5.8 X10*3/UL (ref 4.4–11.3)

## 2024-10-19 PROCEDURE — 2500000004 HC RX 250 GENERAL PHARMACY W/ HCPCS (ALT 636 FOR OP/ED)

## 2024-10-19 PROCEDURE — 82947 ASSAY GLUCOSE BLOOD QUANT: CPT

## 2024-10-19 PROCEDURE — 92610 EVALUATE SWALLOWING FUNCTION: CPT | Mod: GN

## 2024-10-19 PROCEDURE — 85027 COMPLETE CBC AUTOMATED: CPT | Performed by: PHYSICIAN ASSISTANT

## 2024-10-19 PROCEDURE — 97165 OT EVAL LOW COMPLEX 30 MIN: CPT | Mod: GO

## 2024-10-19 PROCEDURE — 2500000004 HC RX 250 GENERAL PHARMACY W/ HCPCS (ALT 636 FOR OP/ED): Performed by: INTERNAL MEDICINE

## 2024-10-19 PROCEDURE — 1200000002 HC GENERAL ROOM WITH TELEMETRY DAILY

## 2024-10-19 PROCEDURE — 97161 PT EVAL LOW COMPLEX 20 MIN: CPT | Mod: GP

## 2024-10-19 PROCEDURE — 80048 BASIC METABOLIC PNL TOTAL CA: CPT | Performed by: PHYSICIAN ASSISTANT

## 2024-10-19 PROCEDURE — 99233 SBSQ HOSP IP/OBS HIGH 50: CPT | Performed by: PSYCHIATRY & NEUROLOGY

## 2024-10-19 PROCEDURE — 36415 COLL VENOUS BLD VENIPUNCTURE: CPT | Performed by: PHYSICIAN ASSISTANT

## 2024-10-19 PROCEDURE — 2500000001 HC RX 250 WO HCPCS SELF ADMINISTERED DRUGS (ALT 637 FOR MEDICARE OP): Performed by: PHYSICIAN ASSISTANT

## 2024-10-19 PROCEDURE — 2500000004 HC RX 250 GENERAL PHARMACY W/ HCPCS (ALT 636 FOR OP/ED): Performed by: PHYSICIAN ASSISTANT

## 2024-10-19 RX ORDER — OXYCODONE AND ACETAMINOPHEN 10; 325 MG/1; MG/1
1 TABLET ORAL EVERY 4 HOURS PRN
Status: DISCONTINUED | OUTPATIENT
Start: 2024-10-19 | End: 2024-10-20 | Stop reason: HOSPADM

## 2024-10-19 RX ORDER — DIAZEPAM 2 MG/1
2 TABLET ORAL EVERY 8 HOURS PRN
Status: DISCONTINUED | OUTPATIENT
Start: 2024-10-19 | End: 2024-10-20 | Stop reason: HOSPADM

## 2024-10-19 RX ORDER — KETOROLAC TROMETHAMINE 30 MG/ML
15 INJECTION, SOLUTION INTRAMUSCULAR; INTRAVENOUS EVERY 6 HOURS PRN
Status: DISCONTINUED | OUTPATIENT
Start: 2024-10-19 | End: 2024-10-20 | Stop reason: HOSPADM

## 2024-10-19 RX ORDER — KETOROLAC TROMETHAMINE 30 MG/ML
30 INJECTION, SOLUTION INTRAMUSCULAR; INTRAVENOUS ONCE
Status: COMPLETED | OUTPATIENT
Start: 2024-10-19 | End: 2024-10-19

## 2024-10-19 RX ORDER — ONDANSETRON HYDROCHLORIDE 2 MG/ML
4 INJECTION, SOLUTION INTRAVENOUS EVERY 4 HOURS PRN
Status: DISCONTINUED | OUTPATIENT
Start: 2024-10-19 | End: 2024-10-20 | Stop reason: HOSPADM

## 2024-10-19 ASSESSMENT — PAIN SCALES - GENERAL
PAINLEVEL_OUTOF10: 7
PAINLEVEL_OUTOF10: 6
PAINLEVEL_OUTOF10: 7
PAINLEVEL_OUTOF10: 0 - NO PAIN
PAINLEVEL_OUTOF10: 0 - NO PAIN

## 2024-10-19 ASSESSMENT — COGNITIVE AND FUNCTIONAL STATUS - GENERAL
DAILY ACTIVITIY SCORE: 24
MOBILITY SCORE: 24

## 2024-10-19 ASSESSMENT — PAIN - FUNCTIONAL ASSESSMENT
PAIN_FUNCTIONAL_ASSESSMENT: 0-10

## 2024-10-19 ASSESSMENT — PAIN DESCRIPTION - ORIENTATION: ORIENTATION: RIGHT

## 2024-10-19 ASSESSMENT — PAIN DESCRIPTION - LOCATION: LOCATION: RIB CAGE

## 2024-10-19 NOTE — CONSULTS
Neurological Surgery  Consult Note      Referral Diagnosis:   1. Brain mass        2. Stroke-like episode         Brain mass [G93.89]  Stroke-like episode [R29.90]    Consulting Physician: Nathan Miranda DO    History Of Present Illness  Ayesha Nova is a 54 y.o. female with a history of stage IV metastatic breast cancer who presented to the emergency department earlier today with multiple neurologic complaints.  She states that for the last few weeks she has been experiencing weakness on her left side.  She presented to the emergency department after she experienced word finding difficulty and numbness in her left hand.  As part of her workup, she underwent a CT and follow-up MRI of the brain.  I personally reviewed and interpreted the studies.  These show multiple T2 hyperintense lesions in the brain, concerning for metastatic disease.  There are notable lesions in the cerebellum, griffin, thalamus, and bilateral cerebellar hemispheres.  The largest lesion is within the right frontoparietal area.  This lesion is associated with significant vasogenic edema.  Of note, MRI was done without contrast per the patient's wishes.  She also declined transfer to [facility].  At this time, the patient states that all of her symptoms have resolved.  She denies any headaches, vision changes, speech difficulty, weakness or sensory changes at present.    Past Medical History  Past Medical History:   Diagnosis Date    Personal history of malignant neoplasm of breast     History of malignant neoplasm of breast       Surgical History  Past Surgical History:   Procedure Laterality Date    HYSTERECTOMY  02/29/2016    Hysterectomy    OTHER SURGICAL HISTORY  04/20/2022    Breast reduction    OTHER SURGICAL HISTORY  04/20/2022    Lumpectomy    OTHER SURGICAL HISTORY  04/20/2022    Lung wedge resection       Social History  Social History     Tobacco Use    Smoking status: Former     Current packs/day: 0.00     Types:  Cigarettes     Quit date:      Years since quittin.8    Smokeless tobacco: Never   Substance Use Topics    Alcohol use: Never    Drug use: Never       Allergies  Vancomycin, Morphine, Zofran odt [ondansetron], and Sulfa (sulfonamide antibiotics)  Medications Prior to Admission   Medication Sig Dispense Refill Last Dose/Taking    albuterol 90 mcg/actuation inhaler Inhale 2 puffs every 4 hours if needed for wheezing or shortness of breath.   Past Month    cholecalciferol, vitamin D3, 10 mcg/drop (400 unit/drop) drops Take 1 drop by mouth once daily.   10/18/2024    furosemide (Lasix) 20 mg tablet TAKE 1 TABLET(20 MG) BY MOUTH DAILY 90 tablet 1 10/18/2024    ibuprofen 200 mg tablet Take 1 tablet (200 mg) by mouth every 6 hours.   10/18/2024 Morning    omega 3-dha-epa-fish oil (Fish OiL) 1,000 (120-180) mg capsule Take 1 capsule (1,000 mg) by mouth.   10/18/2024    hydrocortisone-pramoxine (Analpram-HC) 1-1 % rectal cream Insert 1 Application into the rectum 2 times a day as needed for hemorrhoids.   Unknown       Review of Systems   systems reviewed and negative other than what is listed in the history of present illness    Physical Exam  General: well developed, awake/alert/oriented x3, no distress, alert and cooperative  Head/Neck: neck Supple, no apparent injury  ENMT: mucous membranes moist, no apparent injury, no lesions seen  Cardiovascular: no pitting edema, no JVD  Respiratory/Thorax: Normal breath sounds with good chest expansion, thorax symmetric  Skin: warm and dry, no lesions, no rashes    Neurological/Musculoskeletal:    - Posture: normal coronal & sagittal alignment    - Range of motion: full active & passive range of motion    - Muscle Bulk: normal and symmetric in all extremities    - Paraspinal muscle spasm/tenderness absent    - Motor Strength: 5/5 throughout all extremities, no drift    - Sensation: intact to light touch      Last Recorded Vitals  Blood pressure 87/54, pulse 96,  "temperature 36.6 °C (97.9 °F), temperature source Temporal, resp. rate 16, height 1.575 m (5' 2\"), weight 50.5 kg (111 lb 5.3 oz), SpO2 94%.    Relevant Results  Scheduled medications  aspirin, 81 mg, oral, Daily  atorvastatin, 40 mg, oral, Nightly  dexAMETHasone, 10 mg, oral, q6h  enoxaparin, 40 mg, subcutaneous, q24h  [START ON 10/19/2024] furosemide, 20 mg, oral, Daily      Continuous medications     PRN medications  PRN medications: acetaminophen **OR** acetaminophen **OR** acetaminophen, albuterol, diazePAM, hydrocortisone    Results for orders placed or performed during the hospital encounter of 10/18/24 (from the past 96 hours)   POCT GLUCOSE   Result Value Ref Range    POCT Glucose 87 74 - 99 mg/dL   CBC and Auto Differential   Result Value Ref Range    WBC 8.1 4.4 - 11.3 x10*3/uL    nRBC 0.0 0.0 - 0.0 /100 WBCs    RBC 3.97 (L) 4.00 - 5.20 x10*6/uL    Hemoglobin 10.5 (L) 12.0 - 16.0 g/dL    Hematocrit 33.3 (L) 36.0 - 46.0 %    MCV 84 80 - 100 fL    MCH 26.4 26.0 - 34.0 pg    MCHC 31.5 (L) 32.0 - 36.0 g/dL    RDW 16.7 (H) 11.5 - 14.5 %    Platelets 389 150 - 450 x10*3/uL    Neutrophils % 78.8 40.0 - 80.0 %    Immature Granulocytes %, Automated 0.4 0.0 - 0.9 %    Lymphocytes % 14.3 13.0 - 44.0 %    Monocytes % 5.4 2.0 - 10.0 %    Eosinophils % 0.6 0.0 - 6.0 %    Basophils % 0.5 0.0 - 2.0 %    Neutrophils Absolute 6.41 1.20 - 7.70 x10*3/uL    Immature Granulocytes Absolute, Automated 0.03 0.00 - 0.70 x10*3/uL    Lymphocytes Absolute 1.16 (L) 1.20 - 4.80 x10*3/uL    Monocytes Absolute 0.44 0.10 - 1.00 x10*3/uL    Eosinophils Absolute 0.05 0.00 - 0.70 x10*3/uL    Basophils Absolute 0.04 0.00 - 0.10 x10*3/uL   Comprehensive metabolic panel   Result Value Ref Range    Glucose 85 74 - 99 mg/dL    Sodium 136 136 - 145 mmol/L    Potassium 4.2 3.5 - 5.3 mmol/L    Chloride 100 98 - 107 mmol/L    Bicarbonate 27 21 - 32 mmol/L    Anion Gap 13 10 - 20 mmol/L    Urea Nitrogen 18 6 - 23 mg/dL    Creatinine 0.64 0.50 - 1.05 " mg/dL    eGFR >90 >60 mL/min/1.73m*2    Calcium 9.5 8.6 - 10.3 mg/dL    Albumin 3.9 3.4 - 5.0 g/dL    Alkaline Phosphatase 40 33 - 110 U/L    Total Protein 6.9 6.4 - 8.2 g/dL    AST 17 9 - 39 U/L    Bilirubin, Total 0.8 0.0 - 1.2 mg/dL    ALT 10 7 - 45 U/L   Troponin I, High Sensitivity   Result Value Ref Range    Troponin I, High Sensitivity <3 0 - 13 ng/L   Protime-INR   Result Value Ref Range    Protime 13.0 (H) 9.8 - 12.8 seconds    INR 1.2 (H) 0.9 - 1.1   APTT   Result Value Ref Range    aPTT 31 27 - 38 seconds   Lipid Panel   Result Value Ref Range    Cholesterol 168 0 - 199 mg/dL    HDL-Cholesterol 53.1 mg/dL    Cholesterol/HDL Ratio 3.2     LDL Calculated 96 <=99 mg/dL    VLDL 19 0 - 40 mg/dL    Triglycerides 94 0 - 149 mg/dL    Non HDL Cholesterol 115 0 - 149 mg/dL   B-Type Natriuretic Peptide   Result Value Ref Range    BNP 40 0 - 99 pg/mL         Intake/Output Summary (Last 24 hours) at 10/18/2024 2357  Last data filed at 10/18/2024 1821  Gross per 24 hour   Intake 520 ml   Output --   Net 520 ml       Imaging  See HPI    Assessment and Plan  Assessment/Plan     Assessment & Plan  Brain mass    Left-sided weakness      The patient is a 54-year-old female with a history of stage IV metastatic breast cancer who presented to the emergency department earlier today with acute speech difficulty and left hand numbness as well as recent onset left-sided weakness.  Her symptoms have since resolved.  Her MRI of the brain without contrast shows multiple lesions throughout the brain, including in the cerebellum, griffin, thalamus and bilateral cerebellar hemispheres.  The dominant lesion is in the right frontoparietal area.  These lesions are highly concerning for metastatic disease.  I had a lengthy discussion with the patient about her condition and treatment options.  Is reassuring that her symptoms have resolved.  She states that she has not received any steroids since her admission, although this was the  recommendation of Neurology.  I told the patient that I did not see a clear indication for surgery at this time, although a biopsy may be reasonable.  I told her that she may be a candidate for radiation therapy.  I told her that I will be important for her to establish care with a tumor neurosurgeon and radiation oncology.    -Reasonable to hold off on steroids given that patient is no longer symptomatic; these can be given if patient has recurrent symptoms  -I told patient that it would be helpful for her to undergo a repeat MRI in the future with IV contrast  -Recommend that patient be referred to Dr. Holly Qureshi, one of my colleagues that does brain tumor surgery at  within the [facility]  -The patient should also be referred to Radiation Oncology         Attestation  I have reviewed all prior documentation and reviewed the electronic medical record since admission. I have personally have reviewed all advanced imaging not just the reports and used my interpretation as documented as the relevant findings. I have reviewed the risks and benefits of all treatment recommendations listed in this note with the patient and family. I spent a total of 35 minutes in service to this patient's care during this date of service.    Magui Montgomery MD PhD  Attending Neurosurgeon  [facility]   of Neurological Surgery  [facility] [facility] [employer]  Office: [phone]  Fax: [phone]

## 2024-10-19 NOTE — PROGRESS NOTES
Physical Therapy    Physical Therapy Evaluation    Patient Name: Ayesha Nova  MRN: [id number]  Today's Date: 10/19/2024   Time Calculation  Start Time: 0924  Stop Time: 0935  Time Calculation (min): 11 min  921/921-A    Assessment/Plan   PT Assessment  PT Assessment Results: Pain  Rehab Prognosis: Good  Evaluation/Treatment Tolerance: Patient tolerated treatment well  Medical Staff Made Aware: Yes  Assessment Comment: Pt appears to present near functional baseline. Pt able to complete functional mobility tasks at supervision to independent level. No further acute PT needs. Will d/c from caseload. Recommend home /c family assist.  End of Session Patient Position: Bed, 2 rail up, Alarm off, not on at start of session  IP OR SWING BED PT PLAN  Inpatient or Swing Bed: Inpatient  PT Plan  PT Plan: PT Eval only  PT Eval Only Reason: At baseline function  PT Frequency: PT eval only  PT Discharge Recommendations: No further acute PT    Subjective     Current Problem:  1. Brain mass        2. Stroke-like episode          Patient Active Problem List   Diagnosis    Abnormal finding on breast imaging    Pancreatitis, acute ([facility]-HCC)    Breast asymmetry following reconstructive surgery    Gallbladder sludge    Genitourinary syndrome of menopause    Hemorrhoids    History of claustrophobia    Atypical ductal hyperplasia of breast    Hyperlipidemia    Low back pain    Malignant neoplasm metastatic to both lungs    Malignant neoplasm of upper-inner quadrant of right female breast    Multiple pulmonary nodules    Left hip pain    Personal history of COVID-19    Pneumonia due to infectious organism    Cerebrovascular accident (CVA) (Multi)    Metabolic syndrome X    Viral upper respiratory tract infection with cough    Thyrotoxicosis    Palpitations    Ptosis of breast    Dizziness    Malignant pericardial effusion (Multi)    Left-sided weakness    Brain mass       General Visit Information:  General  Reason for Referral: PT eval and  treat  Referred By: Joseluis  Past Medical History Relevant to Rehab: stage IV breast ca, lung ca  Co-Treatment: OT  Co-Treatment Reason: possible two person assist, maximize functional mobility  Prior to Session Communication: Bedside nurse  Patient Position Received: Bed, 2 rail up, Alarm off, not on at start of session  General Comment: Pt presents for evaluation of L sided weakness, numbness, speech changes that have since resolved. Neurosx consulted and state per Brain CT and MRI: multiple T2 hyperintense lesions in the brain, concerning for metastatic disease. There are notable lesions in the cerebellum, griffin, thalamus, and bilateral cerebellar hemispheres. The largest lesion is within the right frontoparietal area. Neurosx recommending further referrals for brain tumor sx neurologist and radiation oncology.  Pt cleared for therapy by nursing. Pt supine, agreeable.    Home Living:  Home Living  Home Living Comments: Pt lives /c spouse and grown children in single story house. 3STE /c rail.    Prior Level of Function:  Prior Function Per Pt/Caregiver Report  Prior Function Comments: Indep /c ADLs, shares IADLs. Pt drives, denies falls or AD use. Reports getting dizzy/lightheaded at times.       Objective     Pain:  Pain Assessment  Pain Assessment: 0-10  0-10 (Numeric) Pain Score: 7 (pt c/o 'cancer pain' pointing to chest/trunk area)  Pain Interventions: Repositioned, Emotional support  Response to Interventions:  (pt fully participates as able)    Cognition:  Cognition  Overall Cognitive Status: Within Functional Limits  Orientation Level: Oriented X4    General Assessments:  General Observation  General Observation: activity orders: ambulate /c A   Activity Tolerance  Endurance: Endurance does not limit participation in activity  Sensation  Sensation Comment: denies n/t  Strength  Strength Comments: BLE 4+/5     Coordination  Movements are Fluid and Coordinated: Yes     Dynamic Sitting Balance  Dynamic  Sitting-Comments: G  Dynamic Standing Balance  Dynamic Standing-Comments: G-    Functional Assessments:     Bed Mobility  Bed Mobility: Yes  Bed Mobility 1  Bed Mobility Comments 1: Supine-sit independent  Transfers  Transfer: Yes  Transfer 1  Trials/Comments 1: Sit-stand independent  Ambulation/Gait Training  Ambulation/Gait Training Performed: Yes (Pt ambulates 70' SBA-supervision. Slow gilda, step through gait, equal step length, slightly guarded posture. No overt instability noted.)          Outcome Measures:     [facility] Basic Mobility  Turning from your back to your side while in a flat bed without using bedrails: None  Moving from lying on your back to sitting on the side of a flat bed without using bedrails: None  Moving to and from bed to chair (including a wheelchair): None  Standing up from a chair using your arms (e.g. wheelchair or bedside chair): None  To walk in hospital room: None  Climbing 3-5 steps with railing: None  Basic Mobility - Total Score: 24              Education Documentation  Mobility Training, taught by Nelly Cervantes PT at 10/19/2024 11:47 AM.  Learner: Patient  Readiness: Acceptance  Method: Explanation  Response: Verbalizes Understanding    Education Comments  No comments found.

## 2024-10-19 NOTE — PROGRESS NOTES
Speech-Language Pathology    SLP Adult Inpatient Speech-Language Pathology Clinical Swallow Evaluation    Patient Name: Ayesha Nova  MRN: [id number]  Today's Date: 10/19/2024   Time Calculation  Start Time: 0830  Stop Time: 0848  Time Calculation (min): 18 min     Current Problem:   1. Brain mass        2. Stroke-like episode          Recommendations:  Continue a Regular Diet (moisten starches/meats) with Thin Liquids  Avoid mixed consistencies  Pass medications whole in puree carrier, follow up with liquid wash  Alternate solids/liquids  Outpatient voice evaluation upon discharge from acute hospital    Dysphagia Goals: (formulated 10/19):   Pt will tolerate LRD without overt s/s of aspiration on all p.o. intake given independent implementation of compensatory swallow controls.   Pt/family education     Assessment:  Assessment Results: Patient presents with a functional oropharyngeal swallowing mechanism at this time, however pt reported dysphagia symptoms over the past two weeks, therefore strict adherence to compesnatory swallow controls warranted. Pt also reports vocal quality changes (raspy/reduced intensity) x2 weeks ago; pt does confirm a history of GERD, but symptoms are not been present recently.     Oral motor exam WNL; all left sided facial sensory changes have fully resolved. Pt's speech is 100% intelligible with no dysarthria or aphasia identified. Conversation speech WNL.     Pt self-fed single and consecutive straw sips of thin liquids, puree, and hard solids without incident. Timely mastication, bolus formation, and A-P transfer of solids. Full oral containment and clearance achieved. Swallow onset appears prompt with adequate hyo-laryngeal elevation palpated. No overt s/s of aspiration and vocal quality/respirations remained unchanged from baseline. Pt does have a baseline chronic dry cough, in which pt confirms is related to lung cancer. Pt states hard/dry solids occasional stick at the level of the  mid/lower esophageal column (where known masses also are), however confirms benefit of alternation of solids/liquids.     Pt did report difficulty swallowing large supplements with water over the past two weeks, however this symptom has also resolved. Given a history of difficulty, however, encouraged pt to avoid mixed consistencies and attempt to swallow pills in puree carrier. Pt did report coughing on the thin liquids when attempting to swallow mixed consistencies in the past.     Medical Staff Made Aware: Yes  Strengths: Motivation, Family/Caregiver Support  Barriers: Comorbidities    Plan:  Inpatient/Swing Bed or Outpatient: Inpatient  Treatment/Interventions: Bolus trials, Assess diet tolerance, Patient/family education  SLP Plan: Skilled SLP  SLP Frequency: One time visit  Duration: 1 week  SLP Discharge Recommendations:  (Outpatient voice evaluation)  Discussed POC: Patient, Nursing (Maiy)  Discussed Risks/Benefits: Yes  Patient/Caregiver Agreeable: Yes      Subjective   Current Problem:  Per stroke protocol, swallow and speech evaluation ordered per NSG.     NSG reports pt swallowed evening medications whole via water without incident.    General Visit Information:  Patient Class: Inpatient  Living Environment: Home  Referred By: Flaquito Huggins  Past Medical History Relevant to Rehab: stage IV breast cancer s/p right lumpectomy, pericardial effusion s/p pericardial window in May and early September of 2024, and lung cancer s/p partial pulmonectomy who presents to the ED for further evaluation of left-sided weakness.  Per spouse, does states she had slurred speech earlier today which has since resolved and patient is back to baseline.  Patient reports left-sided hand, leg, and facial numbness/tingling.  Also reports weakness to her left arm and left leg today.  States the symptoms started acutely this morning and have been progressively getting better, states they have resolved currently in the ED.   Does admit to generalized weakness over the past couple weeks and decreased appetite, however states both have improved.  Developmental Status: Age Appropriate  Patient Seen During This Visit: Yes  Prior to Session Communication: Bedside nurse (Mayi)  Reviewed Procedures and Risks: Yes  [employer] Diet: Regular/Thin Liquids  Current Diet : Regular/Thin Liquids  Dysphagia Diagnosis:  (Oropharyngeal swallow appears functional at this time given adherence to compensatory swallow controls.)    Vital Signs: Afebrile. 97% SPO2 on room air. WBC WNL (5.8).     CxR 10/18: 1. Large mass at the right hilum with right perihilar infiltrate  increased since the previous exam  2. Moderate right pleural effusion similar to the prior exam    MRI Brain 10/18: Multiple intracranial foci of abnormal signal as described above and  suspicious for multifocal metastatic disease involving the  supratentorial and infratentorial compartments. Exclusion of  intravenous contrast somewhat limits evaluation.  Some of the smaller  more solid-appearing foci of increased signal on the  diffusion-weighted sequences particularly in the supratentorial  compartment also favored to represent metastatic lesions though  recent small infarctions can not be excluded.  See above for details.     Objective     Baseline Assessment:  Respiratory Status: Room air  Patient Positioning: Upright in Bed  Baseline Vocal Quality:  (Raspy (pt confirms x2 week duration))    Pain:  Pain Assessment  Pain Assessment: 0-10  0-10 (Numeric) Pain Score: 0 - No pain (Pt denies odynophagia)    Oral/Motor Assessment:  Oral Hygiene: WNL  Dentition: Adequate/Natural  Oral Motor: Within Functional Limits  Facial Sensation: Within Functional Limits    Consistencies Trialed:  Consistencies Trialed: Yes  Consistencies Trialed: Thin (IDDSI Level 0) - Straw, Pureed/extremely thick (IDDSI Level 4), Soft & bite sized/chopped (IDDSI Level 6), Regular (IDDSI Level 7)    Clinical  Observations:  Patient Positioning: Upright in Bed  Was The 3 oz Swallow Protocol Completed: Yes    Inpatient:  Education Documentation  Results/recommendations (including diet level, compensatory swallow controls, adding moisture to starches/meats, POC) discussed with pt and NSG. Pt wishes to have full voice evaluation in the outpatient setting (raspy/reduced intensity vocal quality occurred x2 weeks ago); declined informal speech/language evaluation while in-house. No dysarthria or aphasia identified at the conversation level this date.

## 2024-10-19 NOTE — PROGRESS NOTES
Occupational Therapy    Evaluation    Patient Name: Ayesha Nova  MRN: [id number]  Today's Date: 10/19/2024  Time Calculation  Start Time: 0925  Stop Time: 0935  Time Calculation (min): 10 min    Assessment  IP OT Assessment  OT Assessment: Patient with no deficits affecting ADLs, reports feeling back to baseline status; No O.T. needs.  End of Session Patient Position:  (Patient sitting edge of bed, 2 rails up, call light in reach.)    Plan:  No Skilled OT: No acute OT goals identified  OT Discharge Recommendations: No further acute OT, No OT needed after discharge  OT - OK to Discharge: Yes (from an O.T. standpoint)    Subjective     Current Problem:  Patient Active Problem List   Diagnosis    Abnormal finding on breast imaging    Pancreatitis, acute ([facility])    Breast asymmetry following reconstructive surgery    Gallbladder sludge    Genitourinary syndrome of menopause    Hemorrhoids    History of claustrophobia    Atypical ductal hyperplasia of breast    Hyperlipidemia    Low back pain    Malignant neoplasm metastatic to both lungs    Malignant neoplasm of upper-inner quadrant of right female breast    Multiple pulmonary nodules    Left hip pain    Personal history of COVID-19    Pneumonia due to infectious organism    Cerebrovascular accident (CVA) (Multi)    Metabolic syndrome X    Viral upper respiratory tract infection with cough    Thyrotoxicosis    Palpitations    Ptosis of breast    Dizziness    Malignant pericardial effusion (Multi)    Left-sided weakness    Brain mass       General:  General  Reason for Referral: OT eval & treat (Brain mass with edema)  Referred By: Flaquito Huggins PA-C  Past Medical History Relevant to Rehab: 10/1824: stroke symptoms, speech problems, left sided numbness & weakness. CT brain showed brain mets.   PMH: stage IV breast CA s/p R lumpectomy, lung CA s/p partial pneumonectomy, pericardial effusion with pericardial window in May and Sept 2024  Co-Treatment: PT  Co-Treatment  "Reason: to maximize patient safety & mobility  Prior to Session Communication: Bedside nurse  General Comment: Per conference with RN, patient is medically stable for therapy eval    Precautions:  Precautions Comment: activity: ambulate with assist    Pain:  Pain Assessment  Pain Assessment: 0-10  0-10 (Numeric) Pain Score: 7  Pain Type:  (\"cancer pain\")  Pain Location:  (chest, thorax)  Pain Interventions: Repositioned    Objective     Cognition:  Overall Cognitive Status: Within Functional Limits (A & O x 4, reports feeling back to baseline functional status.)        Home Living:  Home Living Comments: Lives with  & adult children, bed/bath 1st floor with 3 steps to enter with rail. Independent ADLs, transfers & mobility without device; has tub/shower with seat available. Shared IADLs with family. Not driving recently. Denies falls.     ADL:  Grooming Assistance: Independent  UE Dressing Assistance: Independent  LE Dressing Assistance: Independent  Toileting Assistance with Device: Independent    Activity Tolerance:  Endurance: Endurance does not limit participation in activity    Bed Mobility/Transfers:   Bed Mobility  Bed Mobility:  (independent supine to sit)  Transfers  Transfer:  (independent sit to stand from edge of bed. patient reports independent to/from toilet in room)    Ambulation/Gait Training:  Functional Mobility  Functional Mobility Performed:  (SBA/supervision without device, slow paced, guarded; no LOB noted.)    Sitting Balance:  Dynamic Sitting Balance  Dynamic Sitting-Level of Assistance: Independent    Standing Balance:  Dynamic Standing Balance  Dynamic Standing-Level of Assistance: Distant supervision    Vision: Vision - Basic Assessment  Patient Visual Report:  (Patient denies vision changes or deficits.)    Sensation:  Light Touch: No apparent deficits  Sensation Comment: patient denies numbness/tingling, states symptoms have resolved    Strength:  Strength Comments: BUE " WFL    Coordination:  Movements are Fluid and Coordinated: Yes  Coordination Comment: [facility] WNL       Outcome Measures: [facility] Daily Activity  Putting on and taking off regular lower body clothing: None  Bathing (including washing, rinsing, drying): None  Putting on and taking off regular upper body clothing: None  Toileting, which includes using toilet, bedpan or urinal: None  Taking care of personal grooming such as brushing teeth: None  Eating Meals: None  Daily Activity - Total Score: 24        EDUCATION:     Education Documentation  ADL Training, taught by Lucia Caba OT at 10/19/2024 11:54 AM.  Learner: Patient  Readiness: Acceptance  Method: Explanation  Response: Verbalizes Understanding    Education Comments  No comments found.

## 2024-10-19 NOTE — NURSING NOTE
Patient informed this nurse that she was in pain. NP notified. New order for Toradol. Patient refused and stated it drops her blood pressure. Patient was then re educated on Decadron. This nurse asked what medication was taken at home for pain. Patient became defensive stating she only takes motrin that does not work. Patient asked for Valium. Recent blood pressure 92/54. NP notified and declined Valium at this time. Patient informed and educated on Valium and potential side effects. Patient agreed to take Decadron and asked for a Palliative Care consult.

## 2024-10-19 NOTE — CARE PLAN
The patient's goals for the shift include  Rest    The clinical goals for the shift include Maintain Safety      Problem: Fall/Injury  Goal: Not fall by end of shift  Outcome: Progressing     Problem: Fall/Injury  Goal: Be free from injury by end of the shift  Outcome: Progressing     Problem: Pain  Goal: Takes deep breaths with improved pain control throughout the shift  Outcome: Progressing     Problem: Pain  Goal: Turns in bed with improved pain control throughout the shift  Outcome: Progressing     Problem: Skin  Goal: Decreased wound size/increased tissue granulation at next dressing change  Outcome: Progressing  Flowsheets (Taken 10/18/2024 9268)  Decreased wound size/increased tissue granulation at next dressing change: Promote sleep for wound healing
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
## 2024-10-19 NOTE — PROGRESS NOTES
Ayesha Nova is a 54 y.o. female on day 1 of admission presenting with Brain mass.      Subjective   54 y.o. female with past medical history significant for stage IV breast cancer s/p right lumpectomy, pericardial effusion s/p pericardial window in May and early September of 2024, and lung cancer s/p partial pulmonectomy who presents to the ED for further evaluation of left-sided weakness.  Per spouse, does states she had slurred speech earlier today which has since resolved and patient is back to baseline.  Patient reports left-sided hand, leg, and facial numbness/tingling.  Also reports weakness to her left arm and left leg today.  States the symptoms started acutely this morning and have been progressively getting better, states they have resolved currently in the ED.  Does admit to generalized weakness over the past couple weeks and decreased appetite, however states both have improved.  States she does not really follow with oncology for her cancer at the moment.  Denies headaches or vision changes.  Denies confusion or changes in mentation.  Does admit to mild shortness of breath, however states this is chronic.  Does admit to a mild dry cough recently.  Denies chest pains, abdominal pain or nausea, urinary/bowel changes, or fever/chills.  Denies smoking, drinking, or drug use.     ED course: On arrival to the ED, patient is afebrile and hemodynamically stable with SpO2 98% on room air.  Glucose 85.  Electrolytes WNL.  Initial troponin negative.  WBC 8.1.  Hemoglobin 10.5/hematocrit 33.3.  CTA head/neck and CT brain results noted below.  Shows fusiform dilation of cavernous segment of left ICA and heterogeneously enhancing mass compatible with brain metastasis with associated vasogenic edema and mass effect but no midline shift. NIHSS 3 in the ED, left arm and left leg drift and mild/moderate sensory loss.  ED provider discussed with neurosurgery, Dr. Montgomery, who recommended transfer to [facility] however patient  will remain here, patient declined transfer.          Objective     Last Recorded Vitals  BP 96/53   Pulse 87   Temp 36.5 °C (97.7 °F)   Resp 18   Wt 50.5 kg (111 lb 5.3 oz)   SpO2 97%   Intake/Output last 3 Shifts:    Intake/Output Summary (Last 24 hours) at 10/19/2024 1212  Last data filed at 10/18/2024 1821  Gross per 24 hour   Intake 520 ml   Output --   Net 520 ml       Admission Weight  Weight: 54.4 kg (120 lb) (10/18/24 1243)    Daily Weight  10/18/24 : 50.5 kg (111 lb 5.3 oz)    Image Results  XR chest 2 views  Narrative: Interpreted By:  Marni Rich,   STUDY:  XR CHEST 2 VIEWS 10/18/2024 6:48 pm      INDICATION:  Signs/Symptoms:hx pleural effusions      COMPARISON:  09/26/2024      ACCESSION NUMBER(S):  [id number]      ORDERING CLINICIAN:  DENTON CORMIER      TECHNIQUE:  PA and lateral views      FINDINGS:  There is a moderate right pleural effusion. There is a 5.5 cm right  hilar mass with adjacent infiltrate. Pleural thickening is seen in  the right upper chest laterally. There are areas of linear scarring  in the left lung base with areas of suture material.      Impression: 1. Large mass at the right hilum with right perihilar infiltrate  increased since the previous exam  2. Moderate right pleural effusion similar to the prior exam          Signed by: Marni Rich 10/18/2024 7:06 PM  Dictation workstation:   [id number]  MR brain wo IV contrast  Narrative: Interpreted By:  Jordan Mike,   STUDY:  MR BRAIN WO IV CONTRAST;  10/18/2024 6:11 pm      INDICATION:  Signs/Symptoms:Brain tumors..          COMPARISON:  CT head and CTA head today      ACCESSION NUMBER(S):  [id number]      ORDERING CLINICIAN:  RUDDY DÍAZ      TECHNIQUE:  Axial T2, FLAIR, DWI, gradient echo T2 and sagittal and coronal T1  weighted images of brain were acquired.      FINDINGS:  Patient has a known history of breast cancer. There are multiple foci  of signal abnormality most notably on the FLAIR and  diffusion  weighted sequences. Largest of these is along the posterior right  inferior frontal gyrus abutting the central sulcus. Signal  abnormality in the FLAIR sequence here measures 37 mm greatest axial  dimension. Abnormalities involve both the supratentorial and  infratentorial compartments but do not result in significant  intracranial mass effect.      There is no evidence of intracranial extra lesional hemorrhage. No  hydrocephalus. Major vascular flow voids are intact. Some of the  smaller more solid-appearing foci of increased signal on the  diffusion-weighted sequences particularly in the supratentorial  compartment also favored to represent metastatic lesions though  recent small infarctions can not be excluded.      No significant paranasal sinus/ethmoid mucosal inflammatory changes.  No nonspecific mastoid fluid. Orbital contents unremarkable. No  aggressive appearing osseous lesions.      Impression: Multiple intracranial foci of abnormal signal as described above and  suspicious for multifocal metastatic disease involving the  supratentorial and infratentorial compartments. Exclusion of  intravenous contrast somewhat limits evaluation.  Some of the smaller  more solid-appearing foci of increased signal on the  diffusion-weighted sequences particularly in the supratentorial  compartment also favored to represent metastatic lesions though  recent small infarctions can not be excluded.  See above for details.      MACRO:  None      Signed by: Jordan Mike 10/18/2024 6:25 PM  Dictation workstation:   [id number]  CT brain attack angio head and neck W and WO IV contrast  Narrative: Interpreted By:  Esperanza Pereira,   STUDY:  CT BRAIN ATTACK ANGIO HEAD AND NECK W AND WO IV CONTRAST;  10/18/2024  1:11 pm      INDICATION:  Signs/Symptoms:left side drift.          COMPARISON:  Same day unenhanced head CT which is reported separately.      ACCESSION NUMBER(S):  [id number]      ORDERING  CLINICIAN:  CHRISTIANO KILGORE      TECHNIQUE:  100 ML of Omnipaque 350 was administered intravenously and axial  images of the head and neck were acquired.  Coronal, sagittal, and  3-D reconstructions were provided for review. 3D reconstructions were  performed utilizing an independent workstation.      FINDINGS:          CTA HEAD FINDINGS:      Anterior circulation: The intracranial segments of the internal  carotid arteries and the proximal anterior and middle cerebral  arteries are patent. There is fusiform dilatation of the cavernous  segment of the left ICA measuring approximately 4.5 mm.      Posterior circulation: Bilateral intracranial vertebral arteries,  vertebrobasilar junction, basilar artery and proximal posterior  cerebral arteries are patent.      The source images demonstrate multifocal areas of vasogenic edema  with local mass effect in the cerebral hemispheres bilaterally. There  are heterogeneously enhancing masses in the right frontoparietal lobe  and left frontal lobe in particular.      CTA neck:          The aortic arch and arch vessels are somewhat degraded by artifact.  There is a three-vessel aortic arch.      Carotid vessels:      The proximal common carotid arteries are degraded by artifact but  otherwise appear patent. The carotid bifurcations and cervical  segments of the ICAs demonstrate no measurable stenosis. There is  tortuosity and ectasia of the distal cervical ICA on the right.      Vertebral vessels:      The left vertebral artery is dominant. There is tortuosity of the  vessels bilaterally without measurable stenosis.      There is a partially imaged large mass involving the included portion  of the mediastinum and right upper lobe. There is at least partial  encasement of the aortic arch, proximal arch vessels, and right  greater than left mainstem bronchi. The right pulmonary artery is at  least markedly narrowed. There are innumerable nodular and masslike  densities more  diffusely within the included portion of the right  upper lobe. There is a right-sided pleural effusion.      There are degenerative changes of the cervical spine.      There is scattered soft tissue emphysema which may be vascular in due  to contrast injection.      There are nonspecific cervical lymph nodes.              Impression: 1. No definitive evidence for hemodynamically significant stenosis or  proximal large branch vessel cutoff on CTA of the head or neck. There  is fusiform dilatation of the cavernous segment of the left ICA.  2. Heterogeneously enhancing masses compatible with brain metastasis  with associated vasogenic edema and mass effect but no midline shift.  MRI brain with contrast is recommended for further evaluation.  3. Partially imaged large mass within the superior mediastinum and  right upper lobe with marked narrowing of the right pulmonary artery.      MACRO:  None      Signed by: Esperanza Pereira 10/18/2024 1:39 PM  Dictation workstation:   [id number]  CT brain attack head wo IV contrast  Narrative: Interpreted By:  Schoenberger, Joseph,   STUDY:  CT BRAIN ATTACK HEAD WO IV CONTRAST;  10/18/2024 1:00 pm      INDICATION:  Signs/Symptoms:Stroke Evaluation.          COMPARISON:  None.      ACCESSION NUMBER(S):  [id number]      ORDERING CLINICIAN:  CHRISTIANO KILGORE      TECHNIQUE:  Noncontrast axial CT scan of head was performed. Angled reformats in  brain and bone windows were generated. The images were reviewed in  bone, brain, blood and soft tissue windows.      FINDINGS:  CSF Spaces: The ventricles, sulci and basal cisterns are within  normal limits. There is no extraaxial fluid collection.      Parenchyma: There are 2 foci of vasogenic edema with relatively mild  mass effect to include somewhat effacement of adjacent sulci but no  significant effacement of lateral ventricles. 1 is in the posterior  aspect of the right frontal lobe and 1 is in the left frontal lobe.  They both have  areas of soft tissue attenuation and are most  consistent with metastatic lesions. These are new findings compared  to the prior examination. There are several cortical calcifications  in the brain. The largest is in the left frontal lobe is unchanged.  Several tiny cortical calcifications are new from the prior. There of  uncertain significance.. No other definite masslike lesions are  noted. The findings above warrants further evaluation with contrast  enhanced MRI of the brain. No definite acute intracranial hemorrhage.      Calvarium: The calvarium itself is unremarkable. In the midline of  the clivus there is a lucency posterior to the nasopharynx with  well-defined sclerotic borders which is not different than the prior  and does not extend to the inner table of the posterior fossa is  likely developmental.      Paranasal sinuses and mastoids: Visualized paranasal sinuses and  mastoids are clear.      Impression: In the interval since the prior exam there are 2 new foci of  vasogenic edema which suggest metastatic disease and warrant further  evaluation with contrast-enhanced brain MRI.      There is several cortical calcifications. The largest is in the  posterior left frontal images and is unchanged. There are several  tiny calcifications which are new from the prior and of uncertain  significance.          No evidence of intracranial hemorrhage or displaced skull fracture.      MACRO:  Joseph Schoenberger discussed the significance and urgency of this  critical finding by telephone with  CHRISTIANO KILGORE on 10/18/2024 at  1:20 pm.  (**-RCF-**) Findings:  BAT.          Signed by: Joseph Schoenberger 10/18/2024 1:20 PM  Dictation workstation:   [id number]    Results from last 7 days   Lab Units 10/19/24  0446   WBC AUTO x10*3/uL 5.8   HEMOGLOBIN g/dL 9.9*   HEMATOCRIT % 31.3*   PLATELETS AUTO x10*3/uL 365      Results from last 7 days   Lab Units 10/19/24  0446 10/18/24  1333   SODIUM mmol/L 138 136    POTASSIUM mmol/L 3.8 4.2   CHLORIDE mmol/L 101 100   CO2 mmol/L 27 27   BUN mg/dL 17 18   CREATININE mg/dL 0.58 0.64   CALCIUM mg/dL 9.3 9.5   PROTEIN TOTAL g/dL  --  6.9   BILIRUBIN TOTAL mg/dL  --  0.8   ALK PHOS U/L  --  40   ALT U/L  --  10   AST U/L  --  17   GLUCOSE mg/dL 87 85      She is having a lot of generalized pain paresthesias no fever she is tachycardic no vomiting or diarrhea at this time    Physical Exam  Is awake and alert lungs are diminished but clear heart has a regular rate and rhythm abdomen is soft is not distended or firm she is neurologically intact her skin is warm and dry she has no clubbing or peripheral cyanosis  Relevant Results               Assessment/Plan   This patient currently has cardiac telemetry ordered; if you would like to modify or discontinue the telemetry order, click here to go to the orders activity to modify/discontinue the order.              Assessment & Plan  Brain mass    Left-sided weakness    Brain mass with edema, no midline shift noted on CT  #Left-sided weakness/numbness, resolved  #Slurred speech, resolved  Concern for TIA versus stroke, however symptoms likely related to brain mass  Admit as inpatient with telemetry monitoring  Neurosurgery consult  Neurology consult  MRI head pending  Decadron every 6 hours  Aspirin 81 mg daily  Started on Lipitor 40 mg nightly  Q 4 neurochecks  Q 4 vitals  Regular diet  CBC, BMP in the a.m.  Tylenol as needed  Breathing treatments as needed  Hemoglobin A1c, lipid panel, BNP added onto lab work  Chest x-ray ordered  PT/OT/speech for further evaluation and treatment     CAT scan revealed 2 areas of vasogenic edema of the brain with mild mass effect including somewhat effacement of the adjacent sulcus I also some aspect in the posterior aspect of the right frontal lobe with edema and 1 area in the left frontal her MRI shows what appears to be solid lesions with metastatic disease    I am going to consult with neurology I am  going to consult hematology oncology at this time I will put both of those consults and    I am going to start some Decadron probably 4 mg or even 6 mg twice daily until seen by heme-onc and neurology this could be the etiology to her slurring of her speech and the headaches and the facial numbness and tingling along with left sided hand leg numbness and tingling as well also going to put in a consult with palliative care and I will put her on some Percocet every 4 hours as needed for pain      Apparently she is not following up with oncology at this time we will further discuss that with her as well for now Stephanie review her renal function and order that MRI with IV contrast since that was recommendation per radiology to define no solid masses                Nathan Miranda, DO

## 2024-10-20 VITALS
RESPIRATION RATE: 16 BRPM | HEART RATE: 86 BPM | WEIGHT: 111.33 LBS | DIASTOLIC BLOOD PRESSURE: 62 MMHG | SYSTOLIC BLOOD PRESSURE: 101 MMHG | TEMPERATURE: 97.9 F | OXYGEN SATURATION: 96 % | BODY MASS INDEX: 20.49 KG/M2 | HEIGHT: 62 IN

## 2024-10-20 PROCEDURE — 2500000004 HC RX 250 GENERAL PHARMACY W/ HCPCS (ALT 636 FOR OP/ED): Performed by: INTERNAL MEDICINE

## 2024-10-20 PROCEDURE — 2500000001 HC RX 250 WO HCPCS SELF ADMINISTERED DRUGS (ALT 637 FOR MEDICARE OP): Performed by: INTERNAL MEDICINE

## 2024-10-20 PROCEDURE — 2500000001 HC RX 250 WO HCPCS SELF ADMINISTERED DRUGS (ALT 637 FOR MEDICARE OP): Performed by: PHYSICIAN ASSISTANT

## 2024-10-20 PROCEDURE — 2500000004 HC RX 250 GENERAL PHARMACY W/ HCPCS (ALT 636 FOR OP/ED)

## 2024-10-20 ASSESSMENT — PAIN SCALES - GENERAL: PAINLEVEL_OUTOF10: 0 - NO PAIN

## 2024-10-20 NOTE — PROGRESS NOTES
Sw followed up with patient about palliative care. Questions answered about Palliative Care. She reports her mo is [facility] and she is note ready for hospice but would consider palliative care. A list palliative care was provided. She was going to speak with [facility] about palliative as she is familiar with this agency. She will reach out to pcp or oncology for further needs. No further questions. Support provided. MARTINEZ Bui

## 2024-10-20 NOTE — PROGRESS NOTES
10/20/24 1211   Discharge Planning   Living Arrangements Spouse/significant other   Support Systems Spouse/significant other   Assistance Needed none   Type of Residence Private residence   Expected Discharge Disposition Home   Does the patient need discharge transport arranged? No     10/20/2024  Met with pt in room, introduced self and explained role.  Verified insurance, address, phone.   PCP- none (list provided for pt)  Pharmacy- [facility] [address]       No difficulty obtaining or paying for medications. Takes as prescribed.   Pt is from home, lives with spouse and children. Independent.   Stated spouse is a  and helps as needed.  Does not feel she will have any needs upon discharge.    Asking about palliative care.  Informed pt will have social work follow up with her to answer questions. (Secure chat to social work asking them to follow up this afternoon). Support provided.   Naheed Jean Rn TCC

## 2024-10-20 NOTE — DISCHARGE SUMMARY
Discharge Diagnosis  Brain mass    Issues Requiring Follow-Up      Discharge Meds     Medication List      ASK your doctor about these medications     albuterol 90 mcg/actuation inhaler   cholecalciferol (vitamin D3) 10 mcg/drop (400 unit/drop) drops   doxycycline 100 mg tablet; Commonly known as: Vibra-Tabs; Ask about:   Should I take this medication?   Fish OiL 1,000 (120-180) mg capsule; Generic drug: omega 3-dha-epa-fish   oil   furosemide 20 mg tablet; Commonly known as: Lasix; TAKE 1 TABLET(20 MG)   BY MOUTH DAILY   hydrocortisone-pramoxine 1-1 % rectal cream; Commonly known as:   Analpram-HC   ibuprofen 200 mg tablet   ketorolac 10 mg tablet; Commonly known as: Toradol; Ask about: Should I   take this medication?       Test Results Pending At Discharge  Pending Labs       Order Current Status    Hemoglobin A1C In process            Hospital Course    54-year-old female with a history of stage IV metastatic breast cancer who presented to the emergency department earlier today with acute speech difficulty and left hand numbness as well as recent onset left-sided weakness.  Her symptoms have since resolved.  Her MRI of the brain without contrast shows multiple lesions throughout the brain, including in the cerebellum, griffin, thalamus and bilateral cerebellar hemispheres.  The dominant lesion is in the right frontoparietal area.  These lesions are highly concerning for metastatic disease.  I had a lengthy discussion with the patient about her condition and treatment options.  Is reassuring that her symptoms have resolved.  She states that she has not received any steroids since her admission, although this was the recommendation of Neurology.  I told the patient that I did not see a clear indication for surgery at this time, although a biopsy may be reasonable.  I told her that she may be a candidate for radiation therapy.  I told her that I will be important for her to establish care with a tumor neurosurgeon and  radiation oncology.   Spoke with neurology at length who conveyed with neurosurgery and it was determined that she does not in fact need any further Decadron that she could go home and then follow-up with a Dr. Holly Qureshi at [facility] who does tumor surgery of the brain she the neurosurgeon so we went ahead and made arrangements for her to follow-up with her at the time.  On the day no seizures no problems or issues no respiratory distress felt that she could be discharged and she was discharged in stable condition discharge day management greater than 30 minutes total time thank you    Pertinent Physical Exam At Time of Discharge  Physical Exam    Outpatient Follow-Up  Future Appointments   Date Time Provider Department Center   10/21/2024 10:30 AM LEANDER Ch-CNP [id number] [facility]   11/8/2024 11:00 AM Chas Jorgensen MD [id number] [facility]         Nathan Miranda DO

## 2024-10-21 ENCOUNTER — DOCUMENTATION (OUTPATIENT)
Dept: CARE COORDINATION | Facility: CLINIC | Age: 54
End: 2024-10-21
Payer: COMMERCIAL

## 2024-10-21 ENCOUNTER — APPOINTMENT (OUTPATIENT)
Dept: CARDIAC SURGERY | Facility: CLINIC | Age: 54
End: 2024-10-21
Payer: COMMERCIAL

## 2024-10-21 LAB
EST. AVERAGE GLUCOSE BLD GHB EST-MCNC: 117 MG/DL
HBA1C MFR BLD: 5.7 %

## 2024-10-22 LAB
ATRIAL RATE: 95 BPM
P AXIS: 62 DEGREES
P OFFSET: 203 MS
P ONSET: 156 MS
PR INTERVAL: 134 MS
Q ONSET: 223 MS
QRS COUNT: 16 BEATS
QRS DURATION: 68 MS
QT INTERVAL: 356 MS
QTC CALCULATION(BAZETT): 447 MS
QTC FREDERICIA: 414 MS
R AXIS: -17 DEGREES
T AXIS: 65 DEGREES
T OFFSET: 401 MS
VENTRICULAR RATE: 95 BPM

## 2024-10-24 ENCOUNTER — TELEMEDICINE (OUTPATIENT)
Dept: CARDIAC SURGERY | Facility: CLINIC | Age: 54
End: 2024-10-24
Payer: COMMERCIAL

## 2024-10-24 DIAGNOSIS — C78.01 MALIGNANT NEOPLASM METASTATIC TO BOTH LUNGS: ICD-10-CM

## 2024-10-24 DIAGNOSIS — C78.02 MALIGNANT NEOPLASM METASTATIC TO BOTH LUNGS: ICD-10-CM

## 2024-10-24 DIAGNOSIS — I31.31 MALIGNANT PERICARDIAL EFFUSION (MULTI): Primary | ICD-10-CM

## 2024-10-24 DIAGNOSIS — I31.39 PERICARDIAL EFFUSION (HHS-HCC): ICD-10-CM

## 2024-10-24 DIAGNOSIS — J90 PLEURAL EFFUSION: ICD-10-CM

## 2024-10-24 RX ORDER — FUROSEMIDE 20 MG/1
20 TABLET ORAL DAILY
Qty: 90 TABLET | Refills: 0 | Status: SHIPPED | OUTPATIENT
Start: 2024-10-24

## 2024-10-24 ASSESSMENT — ENCOUNTER SYMPTOMS
DIARRHEA: 0
CONFUSION: 0
ABDOMINAL DISTENTION: 0
DEPRESSION: 0
FEVER: 0
PHOTOPHOBIA: 0
NERVOUS/ANXIOUS: 0
COLOR CHANGE: 0
FATIGUE: 1
PALPITATIONS: 0
OCCASIONAL FEELINGS OF UNSTEADINESS: 0
SORE THROAT: 0
SHORTNESS OF BREATH: 0
FREQUENCY: 0
LOSS OF SENSATION IN FEET: 0
TROUBLE SWALLOWING: 0
DIZZINESS: 0
CHILLS: 0
CONSTIPATION: 0
DIAPHORESIS: 0
CHEST TIGHTNESS: 0
MYALGIAS: 0
HALLUCINATIONS: 0

## 2024-10-24 ASSESSMENT — PAIN SCALES - GENERAL: PAINLEVEL_OUTOF10: 0-NO PAIN

## 2024-10-24 NOTE — PROGRESS NOTES
Cardiac Surgery   Outpatient Clinic Note       Patient ID: Ayesha Nova ([id number])     Ayesha Nova is a 54 y.o. female who presents to the outpatient cardiac surgery clinic on 10/24/24 for a virtual follow up vist; they are s/p redo pericardial window creation (subxiphoid approach) with Dr. Saad Giles on 08/27/24.     She has a PMH significant for longstanding recurrent metastatic breast cancer (initial dx 2016 - ductal carcinoma [G3, ER +, HER2/della amplified] with metastatic lesions to bilateral lungs - has declined treatment), LLL Lung Nodule (S/p surgical resection in 2018), Malignant Pericardial Effusion (S/p pericardial window 5/2/24 through a R VATS approach). Following her initial window creation back in May 2024, she presented back to [facility] in August 2024 with complaints of dyspnea & chest discomfort. She was noted to be with a recurrent pericardial effusion with tamponade physiology along with a right pleural effusion. She subsequently underwent a redo pericardial window creation through a subxiphoid approach on 08/27/2024. She recovered from the procedure well and was subsequently discharged to home on 08/29/24.      During her last visit with me on 09/12/24, Mrs. Nova was recovering well. She was noted to have a R pleural effusion that was subsequently treated with diuretic therapy. She was also ordered a repeat TTE to follow up with her pericardial effusion     On 10/18, the patient presented to [facility] ED with acute L sided weakness. Through workup, she was found to have multiple metastatic lesions throughout her brain on a MRI. Her symptoms resolved and she was discharged to home on 10/20/24 and to follow up with neurosurgery.     She has a virtual visit with cardiac surgery today to follow up regarding her R pleural effusion and pericardial effusion (s/p window).     Subjective   Review of Systems   Constitutional:  Positive for fatigue. Negative for chills, diaphoresis and fever.   HENT:   Negative for congestion, nosebleeds, sore throat and trouble swallowing.    Eyes:  Negative for photophobia.   Respiratory:  Negative for chest tightness and shortness of breath.    Cardiovascular:  Negative for chest pain, palpitations and leg swelling.   Gastrointestinal:  Negative for abdominal distention, constipation and diarrhea.   Endocrine: Negative for cold intolerance and heat intolerance.   Genitourinary:  Negative for frequency and urgency.   Musculoskeletal:  Negative for myalgias.   Skin:  Negative for color change and rash.   Allergic/Immunologic: Negative for environmental allergies.   Neurological:  Negative for dizziness.   Psychiatric/Behavioral:  Negative for confusion and hallucinations. The patient is not nervous/anxious.    All other systems reviewed and are negative.    Past Medical History:   Diagnosis Date    Personal history of malignant neoplasm of breast     History of malignant neoplasm of breast     Past Surgical History:   Procedure Laterality Date    HYSTERECTOMY  2016    Hysterectomy    OTHER SURGICAL HISTORY  2022    Breast reduction    OTHER SURGICAL HISTORY  2022    Lumpectomy    OTHER SURGICAL HISTORY  2022    Lung wedge resection     Family History   Problem Relation Name Age of Onset    Dementia Mother      Heart attack Father      Other (cabg) Father       Social History     Socioeconomic History    Marital status:      Spouse name: Not on file    Number of children: Not on file    Years of education: Not on file    Highest education level: Not on file   Occupational History    Not on file   Tobacco Use    Smoking status: Former     Current packs/day: 0.00     Types: Cigarettes     Quit date:      Years since quittin.8    Smokeless tobacco: Never   Substance and Sexual Activity    Alcohol use: Never    Drug use: Never    Sexual activity: Not on file   Other Topics Concern    Not on file   Social History Narrative    Not on file      Social Drivers of Health     Financial Resource Strain: Low Risk  (10/18/2024)    Overall Financial Resource Strain (CARDIA)     Difficulty of Paying Living Expenses: Not very hard   Food Insecurity: No Food Insecurity (10/18/2024)    Hunger Vital Sign     Worried About Running Out of Food in the Last Year: Never true     Ran Out of Food in the Last Year: Never true   Transportation Needs: No Transportation Needs (10/18/2024)    PRAPARE - Transportation     Lack of Transportation (Medical): No     Lack of Transportation (Non-Medical): No   Physical Activity: Not on file   Stress: Not on file   Social Connections: Not on file   Intimate Partner Violence: Not At Risk (10/18/2024)    Humiliation, Afraid, Rape, and Kick questionnaire     Fear of Current or Ex-Partner: No     Emotionally Abused: No     Physically Abused: No     Sexually Abused: No   Housing Stability: Low Risk  (10/18/2024)    Housing Stability Vital Sign     Unable to Pay for Housing in the Last Year: No     Number of Times Moved in the Last Year: 0     Homeless in the Last Year: No       Current Medications   Current Outpatient Medications   Medication Instructions    albuterol 90 mcg/actuation inhaler Inhale 2 puffs every 4 hours if needed for wheezing or shortness of breath.    cholecalciferol, vitamin D3, 10 mcg/drop (400 unit/drop) drops 1 drop, Daily    furosemide (LASIX) 20 mg, oral, Daily    hydrocortisone-pramoxine (Analpram-HC) 1-1 % rectal cream 1 Application, 2 times daily PRN    omega 3-dha-epa-fish oil (Fish OiL) 1,000 (120-180) mg capsule 1 capsule        Objective   There were no vitals filed for this visit.  Physical Exam  Neurological:      Mental Status: She is alert and oriented to person, place, and time. Mental status is at baseline.   Psychiatric:         Attention and Perception: Attention normal.         Mood and Affect: Mood normal.         Speech: Speech normal.         Behavior: Behavior is cooperative.         Thought  Content: Thought content normal.         Cognition and Memory: Cognition normal.       Cardiology Tests      Echo:     Transthoracic Echo - Limited (TTE) Complete 09/20/2024  PHYSICIAN INTERPRETATION:  Left Ventricle: Left ventricular ejection fraction is normal, calculated by Aguillon's biplane at 57%. There are no regional left ventricular wall motion abnormalities. The left ventricular cavity size is decreased. Left ventricular diastolic filling was not assessed.  Left Atrium: The left atrium was not assessed.  Right Ventricle: The right ventricle was not assessed. Right ventricular systolic function not assessed.  Right Atrium: The right atrium was not assessed.  Aortic Valve: The aortic valve is trileaflet. There is no evidence of aortic valve regurgitation.  Mitral Valve: The mitral valve is normal in structure. There is no evidence of mitral valve regurgitation.  Tricuspid Valve: The tricuspid valve is structurally normal. No evidence of tricuspid regurgitation.  Pulmonic Valve: The pulmonic valve is not well visualized. The pulmonic valve regurgitation was not well visualized.  Pericardium: Small pericardial effusion. The pericardial effusion appears to contain fibrinous material. There is no evidence of cardiac tamponade.  Aorta: The aortic root is normal.  Systemic Veins: The inferior vena cava was not well visualized.  In comparison to the previous echocardiogram(s): Compared with study dated 8/25/2024,. Small pericardial effusion with fibrinous material; no cardiac tamponade.        CONCLUSIONS:   1. Left ventricular ejection fraction is normal, calculated by Aguillon's biplane at 57%.   2. Small pericardial effusion with fibrinous material; no cardiac tamponade.  Transthoracic Echo (TTE) Complete 08/26/2024  PHYSICIAN INTERPRETATION:  Left Ventricle: Left ventricular ejection fraction is normal, by visual estimate at 60-65%. There are no regional left ventricular wall motion abnormalities. The left  ventricular cavity size is normal. Spectral Doppler shows a normal pattern of left ventricular diastolic filling.  Left Atrium: The left atrium is normal in size.  Right Ventricle: The right ventricle is small in size. There is normal right ventricular global systolic function.  Right Atrium: The right atrium is small in size.  Aortic Valve: The aortic valve was not well visualized. There is no evidence of aortic valve regurgitation.  Mitral Valve: The mitral valve is normal in structure. There is trace mitral valve regurgitation.  Tricuspid Valve: The tricuspid valve is structurally normal. No evidence of tricuspid regurgitation.  Pulmonic Valve: The pulmonic valve is not well visualized. There is no indication of pulmonic valve regurgitation.  Pericardium: There is a large pericardial effusion.  Aorta: The aortic root is normal.        CONCLUSIONS:   1. Left ventricular ejection fraction is normal, by visual estimate at 60-65%.   2. There is normal right ventricular global systolic function.   3. There is a large pericardial effusion.   4. Echocardiographic findings are consistent with cardiac tamponade.     Transthoracic echo (TTE) limited 05/06/2024  PHYSICIAN INTERPRETATION:  Left Ventricle: The left ventricular systolic function is normal, with an estimated ejection fraction of 60%. There are no regional wall motion abnormalities. The left ventricular cavity size is normal. Spectral Doppler shows a normal pattern of left ventricular diastolic filling.  Left Atrium: The left atrium is normal in size.  Right Ventricle: The right ventricle is normal in size. There is normal right ventricular global systolic function.  Right Atrium: The right atrium is normal in size.  Aortic Valve: The aortic valve appears structurally normal. There is no evidence of aortic valve regurgitation. The peak instantaneous gradient of the aortic valve is 6.3 mmHg. The mean gradient of the aortic valve is 3.6 mmHg.  Mitral Valve: The  mitral valve is normal in structure. There is no evidence of mitral valve regurgitation.  Tricuspid Valve: The tricuspid valve is structurally normal. No evidence of tricuspid regurgitation.  Pulmonic Valve: The pulmonic valve is structurally normal. There is no indication of pulmonic valve regurgitation.  Pericardium: There is a small pericardial effusion anterior to the right ventricle.  Aorta: The aortic root is normal.        CONCLUSIONS:   1. Left ventricular systolic function is normal with a 60% estimated ejection fraction.   2. Small pericardial effusion located anterior to the right ventricle.     Transthoracic Echo (TTE) Complete 05/02/2024  PHYSICIAN INTERPRETATION:  Left Ventricle: The left ventricular systolic function is normal, with an estimated ejection fraction of 55-60%. There are no regional wall motion abnormalities. The left ventricular cavity size is normal. Left ventricular diastolic filling was not assessed.  Left Atrium: The left atrium was not assessed.  Right Ventricle: The right ventricle was not assessed. Right ventricular systolic function not assessed.  Right Atrium: The right atrium was not assessed.  Aortic Valve: The aortic valve was not assessed. Aortic valve regurgitation was not assessed.  Mitral Valve: The mitral valve was not assessed. Mitral valve regurgitation was not assessed.  Tricuspid Valve: The tricuspid valve was not assessed. Tricuspid regurgitation was not assessed.  Pulmonic Valve: The pulmonic valve was not assessed. Pulmonic valve regurgitation was not assessed.  Pericardium: There is a large pericardial effusion. There is right ventricular diastolic collapse, is brief right atrial diastolic collapse and is inferior vena caval plethora. These findings are consistent with cardiac tamponade.  Aorta: The aortic root was not assessed.  Systemic Veins: The inferior vena cava appears mildly dilated.        CONCLUSIONS:   1. Left ventricular systolic function is normal  with a 55-60% estimated ejection fraction.   2. Echocardiographic findings are consistent with cardiac tamponade.   3. There is a large pericardial effusion.     Transthoracic echo (TTE) limited 04/15/2024  PHYSICIAN INTERPRETATION:  Left Ventricle: The left ventricular systolic function is normal, with an estimated ejection fraction of 65%. There are no regional wall motion abnormalities. The left ventricular cavity size is normal. Left ventricular diastolic filling was not assessed.  Left Atrium: The left atrium is normal in size.  Right Ventricle: The right ventricle is normal in size. There is normal right ventricular global systolic function.  Right Atrium: The right atrium is normal in size. Mild right atrial collapse.  Aortic Valve: The aortic valve appears structurally normal. There is no evidence of aortic valve regurgitation.  Mitral Valve: The mitral valve is normal in structure. There is no evidence of mitral valve regurgitation.  Tricuspid Valve: The tricuspid valve is structurally normal. There is trace tricuspid regurgitation. The Doppler estimated RVSP is within normal limits at 16.1 mmHg.  Pulmonic Valve: The pulmonic valve is not well visualized. The pulmonic valve regurgitation was not well visualized.  Pericardium: There is a large pericardial effusion.  Aorta: The aortic root is normal.        CONCLUSIONS:   1. Left ventricular systolic function is normal with a 65% estimated ejection fraction.   2. Mild right atrial collapse.   3. There is a large pericardial effusion.   4. RVSP within normal limits.   5. In comparisn to the previous study of 3/15/24, there has been an increase in size of the pericardial effusion.     Onco-Echo Complete (Strain And 3D) 03/15/2024  PHYSICIAN INTERPRETATION:  Left Ventricle: The left ventricular systolic function is hyperdynamic, with an estimated ejection fraction of 65-70%. There are no regional wall motion abnormalities. The left ventricular cavity size is  normal. Spectral Doppler shows a normal pattern of left ventricular diastolic filling.  Left Atrium: The left atrium is normal in size.  Right Ventricle: The right ventricle is normal in size. There is normal right ventricular global systolic function.  Right Atrium: The right atrium is normal in size.  Aortic Valve: The aortic valve appears structurally normal. There is no evidence of aortic valve regurgitation. The peak instantaneous gradient of the aortic valve is 3.8 mmHg. The mean gradient of the aortic valve is 2.2 mmHg.  Mitral Valve: The mitral valve is normal in structure. There is no evidence of mitral valve regurgitation.  Tricuspid Valve: The tricuspid valve is structurally normal. No evidence of tricuspid regurgitation.  Pulmonic Valve: The pulmonic valve is structurally normal. There is no indication of pulmonic valve regurgitation.  Pericardium: There is a moderately sized pericardial effusion. The effusion is circumferential.  Aorta: The aortic root is normal.  Systemic Veins: The inferior vena cava was not well visualized.     ONCO-CARDIOLOGY:  Vital Signs: The patients heart rate during the study was 75 beats per minute.  The patients blood pressure was 102/56 during the study.  Machine: This study was performed on the [employer] E-9.  Previous Study: The patient had a previous Onco-Cardiology echocardiogram dated  5/31/2023. The patient's previous study was performed on the [employer].        Onco-Cardiology Measurements:  Historical Measurements from Previous Study  2D EF (Biplane)                     63%  Global Longitudinal Strain (GLS) -22.3%     Current Measurements  2D EF (Biplane)                     64%  Global Longitudinal Strain (GLS) -20.6%     GLS Tracking Quality: Fair        CONCLUSIONS:   1. Left ventricular systolic function is hyperdynamic with a 65-70% estimated ejection fraction.   2. There is a moderate pericardial effusion.   3. The pericardial effusion is new in comparison to the study  of 5/31/23.   4. Global longitudinal strain is normal at -20%.   5. No hemodynamic evidence of pericardial tamponade.           Assessment & Plan     Ayesha Nova is a 54 y.o.female who presents today for a virtual follow up visit.       Malignant Pericardial Effusion   - Secondary to underlying metastatic breast cancer   - S/p Urgent Pericardial Window (R VATS) on 5/2/24 with Cardiac Surgeon, Dr. Saad Giles   - Patient presented to the ED with tachycardia and dyspnea on 8/25  --> CT chest showing fluid around the pericardium   - TTE on 8/26 showing recurrent of pericardial fluid with a moderate sized effusion   - Now s/p redo pericardial window (Subxiphoid) on 8/27 with Dr. Giles  - Repeat TTE on 09/20/24 showed small pericardial effusion without tamponade physiology  - Patient has been asymptomatic without any dyspnea / tachycardia   - Plan for follow up  limited TTE in November 2024       Cardiologist: Dr. Armando Jorgensen (on 11/08/24)        Malignant Pleural Effusion   - Right loculated effusion appreciated on 8/25 CT chest  - Current O2 Requirements: RA  - CXR showing ongoing stable R pleural effusion   - Continue on furosemide 20mg every day   - Follow up imaging in November   --> Patient will contact office if dyspnea develops to follow   --> Discussed potential need for pleurx if effusion worsens despite diuretic therapy     Virtual follow-up visit, the patient reported that since discharge from the hospital, she has been without recurrent neurological symptoms.  She has not followed up with her [place] [facility] oncologist, however she now has referral to neurosurgery at  with Dr. Holly Qureshi to discuss options for metastatic lesions to her brain.     Postop cardiac surgery perspective, the patient is recovering well from her pericardial window, and without signs of recurrence tamponade.  As she has not yet started treatment for her metastatic disease, I will follow-up with a repeat echo and chest  x-ray in 1 month.  I advised the patient that she should remain on her diuretic therapy, as she does have a small right pleural effusion, however this has remained stable with diuretic therapy.    Again recommended to follow-up with oncology, and neurosurgery for treatment of her metastatic disease processes.    Will schedule follow up visit in 2 months with patient.        Total Time: 20 minutes          LEANDER [facility]   Cardiac Surgery Outpatient Clinic  [facility] - [phone]   [address] [id number]  Office [phone]

## 2024-10-28 ENCOUNTER — OFFICE VISIT (OUTPATIENT)
Dept: NEUROSURGERY | Facility: HOSPITAL | Age: 54
End: 2024-10-28
Payer: COMMERCIAL

## 2024-10-28 VITALS
WEIGHT: 122.6 LBS | OXYGEN SATURATION: 99 % | BODY MASS INDEX: 22.42 KG/M2 | TEMPERATURE: 98.1 F | SYSTOLIC BLOOD PRESSURE: 97 MMHG | HEART RATE: 64 BPM | DIASTOLIC BLOOD PRESSURE: 63 MMHG | RESPIRATION RATE: 16 BRPM

## 2024-10-28 DIAGNOSIS — C79.31 MALIGNANT NEOPLASM METASTATIC TO BRAIN (MULTI): Primary | ICD-10-CM

## 2024-10-28 DIAGNOSIS — C79.31 METASTASIS TO BRAIN (MULTI): Primary | ICD-10-CM

## 2024-10-28 DIAGNOSIS — N60.99 ATYPICAL DUCTAL HYPERPLASIA OF BREAST: ICD-10-CM

## 2024-10-28 PROCEDURE — 99215 OFFICE O/P EST HI 40 MIN: CPT | Performed by: NEUROLOGICAL SURGERY

## 2024-10-28 RX ORDER — PANTOPRAZOLE SODIUM 40 MG/1
40 TABLET, DELAYED RELEASE ORAL
Qty: 30 TABLET | Refills: 11 | Status: SHIPPED | OUTPATIENT
Start: 2024-10-28 | End: 2024-10-31

## 2024-10-28 RX ORDER — DEXAMETHASONE 2 MG/1
2 TABLET ORAL
Qty: 20 TABLET | Refills: 0 | Status: SHIPPED | OUTPATIENT
Start: 2024-10-28 | End: 2024-11-07

## 2024-10-28 ASSESSMENT — PAIN SCALES - GENERAL: PAINLEVEL_OUTOF10: 0-NO PAIN

## 2024-10-28 NOTE — PROGRESS NOTES
[facility]  Neurosurgery    Subjective     Ayesha Nova is a right handed  54 y.o. year old female presenting for initial evaluation  for brain metastases    PMH HLD, endometriosis s/p hysterectomy 2009, recurrent metastatic breast CA initially diagnosed in 2016. Patient s/p right breast biopsy 03/2016 for invasive ductal carcinoma grade III, ER +, NC +, HER2 amplified. Left breast biopsy at that time consistent with fibrocystic changes. She did undergo excisional biopsy of the left breast with no residual findings. Patient declined further treatment with adjuvant chemo, RT, Herceptin, or hormonal therapy. She continued with surveillance and was found to have a left lower lobe nodule and is s/p surgical resection confirming metastatic disease in 2018. Patient was lost to follow up but presented for evaluation with Dr. Alonso and Dr. Gordillo at [facility]. Recommended treatment plan with Herceptin and Perjeta combination systemic therapy but patient declined. 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 She was last admitted in August where she required thoracentesis with exudative fluid consistent with malignant etiology. Final cytology consistent for malignant tumor cells from adenocarcinoma with breast primary. Patient returned to the OR on 08/27 for pericardial window. Last echo was 08/28 with an EF of 55-60%. She followed up with oncology and was recommended for palliative chemotherapy with Docetaxel, Herceptin, Transtuzumab for disease control on SAHRA trial.     Patient was readmitted for WFD and left hand numbness with weakness. MRI was completed and she was found to have multiple lesions throughout the brain - cerebellum, griffin, thalamus, and bilateral cerebral hemisphere. Neurology was consulted and patient was recommended for outpatient evaluation by neurosurgery for biopsy vs resection. Patient presents to clinic for  NPV.     She comes to clinic accompanied by her . As far as ROS, she states that she has been experiencing slurred speech, difficulty with fine motor skills, and general imbalance. She otherwise denies fevers, headaches, nausea, vomiting,  seizures, double/blurry vision, incontinence, pain.     Review of Systems   Neurological:  Positive for weakness and numbness.        Word-finding difficulty   All other systems reviewed and are negative.      Treatment Synopsis:   Brain Tumor: Metastatic brain tumor  Prior history of Radiation: no    Objective     Performance and Vitals:  KARNOFSKY SCALE WITH ECOG EQUIVALENT:60, Requires occasional assistance, but is able to care for most of his personal needs (ECOG equivalent 2)    Vitals:    10/28/24 1344   BP: 97/63   Pulse: 64   Resp: 16   Temp: 36.7 °C (98.1 °F)   SpO2: 99%         Neurological Exam  Mental Status   Oriented to person, place, time and situation. Recent and remote memory are intact. Mild dysarthria present. Language is fluent with no aphasia. Attention and concentration are normal.    Cranial Nerves  CN II: Visual acuity is normal. Visual fields full to confrontation.  CN III, IV, VI: Extraocular movements intact bilaterally. Normal lids and orbits bilaterally. Pupils equal round and reactive to light bilaterally.  CN V: Facial sensation is normal.  CN VII: Full and symmetric facial movement.  CN VIII: Hearing is normal.  CN IX, X: Palate elevates symmetrically. Normal gag reflex.  CN XI: Shoulder shrug strength is normal.  CN XII: Tongue midline without atrophy or fasciculations.    Motor  Normal muscle bulk throughout. No pronator drift.    Sensory  Sensation is intact to light touch, pinprick, vibration and proprioception in all four extremities.    Reflexes  Deep tendon reflexes are 2+ and symmetric in all four extremities.    Gait  Normal casual, toe, heel and tandem gait.    Physical Exam  Constitutional:       Appearance: She is ill-appearing.    Eyes:      General: Lids are normal.      Extraocular Movements: Extraocular movements intact.      Pupils: Pupils are equal, round, and reactive to light.   Neurological:      Cranial Nerves: Dysarthria present.      Deep Tendon Reflexes: Reflexes are normal and symmetric.         Imaging:   Imaging Results: No results found for this or any previous visit from the past 365 days.    No results found for this or any previous visit from the past 365 days.    No results found for this or any previous visit from the past 365 days.    Lab Results   Component Value Date     10/19/2024    K 3.8 10/19/2024     10/19/2024    CO2 27 10/19/2024    BUN 17 10/19/2024    CREATININE 0.58 10/19/2024    GLUCOSE 87 10/19/2024    CALCIUM 9.3 10/19/2024     Lab Results   Component Value Date    WBC 5.8 10/19/2024    HGB 9.9 (L) 10/19/2024    HCT 31.3 (L) 10/19/2024    MCV 84 10/19/2024     10/19/2024           Assessment & Plan     Assessment/Plan   Diagnoses and all orders for this visit:  Metastasis to brain (Multi)  -     dexAMETHasone (Decadron) 2 mg tablet; Take 1 tablet (2 mg) by mouth 2 times daily (morning and late afternoon) for 10 days.  -     Referral to Radiation Oncology; Future  -     Referral to Hematology and Oncology; Future  Atypical ductal hyperplasia of breast  -     dexAMETHasone (Decadron) 2 mg tablet; Take 1 tablet (2 mg) by mouth 2 times daily (morning and late afternoon) for 10 days.  -     Referral to Radiation Oncology; Future  -     Referral to Hematology and Oncology; Future    I discussed her imaging findings with concern for progressive metastastic brain disease. Given the number of the tumors, I would NOT recommend surgical resection. I would recommend radiation therapy.    I will therefore refer her to radiation oncology and medical oncology for consideration of radiotherapy and systemic treatment options at this time.    All of her questions were answered to her satisfaction.                  Medical History     Past Medical History:   Diagnosis Date    Metastatic cancer (Multi)     Personal history of malignant neoplasm of breast     History of malignant neoplasm of breast     Past Surgical History:   Procedure Laterality Date    HYSTERECTOMY  2016    Hysterectomy    OTHER SURGICAL HISTORY  2022    Breast reduction    OTHER SURGICAL HISTORY  2022    Lumpectomy    OTHER SURGICAL HISTORY  2022    Lung wedge resection    PERICARDIAL WINDOW  2024    PERICARDIAL WINDOW  2024     Social History     Tobacco Use    Smoking status: Former     Current packs/day: 0.00     Types: Cigarettes     Quit date:      Years since quittin.8    Smokeless tobacco: Never   Substance Use Topics    Alcohol use: Never    Drug use: Never     Family History   Problem Relation Name Age of Onset    Dementia Mother      Heart attack Father      Other (cabg) Father       Allergies   Allergen Reactions    Vancomycin Anaphylaxis and Itching    Morphine Other     Patient states MS does not work at all for her !    Zofran Odt [Ondansetron] Headache    Sulfa (Sulfonamide Antibiotics) Unknown     Unsure what happed, in childhood     Current Outpatient Medications   Medication Instructions    albuterol 90 mcg/actuation inhaler Inhale 2 puffs every 4 hours if needed for wheezing or shortness of breath.    cholecalciferol, vitamin D3, 10 mcg/drop (400 unit/drop) drops 1 drop, Daily    dexAMETHasone (DECADRON) 2 mg, oral, 2 times daily (morning and late afternoon)    furosemide (LASIX) 20 mg, oral, Daily    hydrocortisone-pramoxine (Analpram-HC) 1-1 % rectal cream 1 Application, 2 times daily PRN    omega 3-dha-epa-fish oil (Fish OiL) 1,000 (120-180) mg capsule 1 capsule    pantoprazole (ProtoNix) 40 mg EC tablet TAKE 1 TABLET BY MOUTH ONCE DAILY IN THE MORNING BEFORE MEALS. DO NOT CRUSH, CHEW, OR SPIT

## 2024-10-30 ENCOUNTER — TELEPHONE (OUTPATIENT)
Dept: HEMATOLOGY/ONCOLOGY | Facility: HOSPITAL | Age: 54
End: 2024-10-30
Payer: COMMERCIAL

## 2024-10-30 ENCOUNTER — TELEPHONE (OUTPATIENT)
Dept: RADIATION ONCOLOGY | Facility: HOSPITAL | Age: 54
End: 2024-10-30
Payer: COMMERCIAL

## 2024-10-30 ENCOUNTER — PATIENT OUTREACH (OUTPATIENT)
Dept: HEMATOLOGY/ONCOLOGY | Facility: HOSPITAL | Age: 54
End: 2024-10-30
Payer: COMMERCIAL

## 2024-10-31 ENCOUNTER — HOSPITAL ENCOUNTER (OUTPATIENT)
Dept: RADIATION ONCOLOGY | Facility: HOSPITAL | Age: 54
Setting detail: RADIATION/ONCOLOGY SERIES
Discharge: HOME | End: 2024-10-31
Payer: COMMERCIAL

## 2024-10-31 VITALS
HEART RATE: 98 BPM | WEIGHT: 124.4 LBS | OXYGEN SATURATION: 98 % | SYSTOLIC BLOOD PRESSURE: 101 MMHG | BODY MASS INDEX: 22.89 KG/M2 | TEMPERATURE: 97.3 F | DIASTOLIC BLOOD PRESSURE: 70 MMHG | RESPIRATION RATE: 18 BRPM | HEIGHT: 62 IN

## 2024-10-31 DIAGNOSIS — C79.31 METASTASIS TO BRAIN (MULTI): ICD-10-CM

## 2024-10-31 DIAGNOSIS — N60.99 ATYPICAL DUCTAL HYPERPLASIA OF BREAST: ICD-10-CM

## 2024-10-31 PROCEDURE — G2211 COMPLEX E/M VISIT ADD ON: HCPCS | Performed by: STUDENT IN AN ORGANIZED HEALTH CARE EDUCATION/TRAINING PROGRAM

## 2024-10-31 PROCEDURE — 99215 OFFICE O/P EST HI 40 MIN: CPT | Performed by: STUDENT IN AN ORGANIZED HEALTH CARE EDUCATION/TRAINING PROGRAM

## 2024-10-31 PROCEDURE — 99205 OFFICE O/P NEW HI 60 MIN: CPT | Performed by: STUDENT IN AN ORGANIZED HEALTH CARE EDUCATION/TRAINING PROGRAM

## 2024-10-31 ASSESSMENT — ENCOUNTER SYMPTOMS
HEADACHES: 1
LOSS OF SENSATION IN FEET: 0
CARDIOVASCULAR NEGATIVE: 1
RESPIRATORY NEGATIVE: 1
CONFUSION: 0
DECREASED CONCENTRATION: 0
VOMITING: 0
FEVER: 0
CHILLS: 0
ENDOCRINE NEGATIVE: 1
TROUBLE SWALLOWING: 1
NAUSEA: 0
SPEECH DIFFICULTY: 1
ABDOMINAL PAIN: 0
FATIGUE: 1
MUSCULOSKELETAL NEGATIVE: 1
SORE THROAT: 0
SLEEP DISTURBANCE: 0
VOICE CHANGE: 1
LIGHT-HEADEDNESS: 0
EXTREMITY WEAKNESS: 0
EYES NEGATIVE: 1
SEIZURES: 0
DEPRESSION: 0
NUMBNESS: 1
OCCASIONAL FEELINGS OF UNSTEADINESS: 1
DIAPHORESIS: 0
NERVOUS/ANXIOUS: 0
ABDOMINAL DISTENTION: 0
UNEXPECTED WEIGHT CHANGE: 0
HEMATOLOGIC/LYMPHATIC NEGATIVE: 1
DIARRHEA: 0
DIZZINESS: 1
APPETITE CHANGE: 0
CONSTIPATION: 0

## 2024-10-31 ASSESSMENT — PATIENT HEALTH QUESTIONNAIRE - PHQ9
1. LITTLE INTEREST OR PLEASURE IN DOING THINGS: NOT AT ALL
SUM OF ALL RESPONSES TO PHQ9 QUESTIONS 1 AND 2: 0
2. FEELING DOWN, DEPRESSED OR HOPELESS: NOT AT ALL

## 2024-11-01 ENCOUNTER — APPOINTMENT (OUTPATIENT)
Dept: HEMATOLOGY/ONCOLOGY | Facility: HOSPITAL | Age: 54
End: 2024-11-01
Payer: COMMERCIAL

## 2024-11-08 ENCOUNTER — APPOINTMENT (OUTPATIENT)
Dept: CARDIOLOGY | Facility: CLINIC | Age: 54
End: 2024-11-08
Payer: COMMERCIAL

## 2024-11-11 ASSESSMENT — ENCOUNTER SYMPTOMS
NUMBNESS: 1
WEAKNESS: 1

## 2024-11-11 ASSESSMENT — VISUAL ACUITY: VA_NORMAL: 1

## 2024-11-22 DIAGNOSIS — C79.31 METASTASIS TO BRAIN (MULTI): ICD-10-CM

## 2024-11-22 DIAGNOSIS — N60.99 ATYPICAL DUCTAL HYPERPLASIA OF BREAST: ICD-10-CM

## 2024-11-22 RX ORDER — DEXAMETHASONE 2 MG/1
TABLET ORAL
Qty: 20 TABLET | Refills: 0 | OUTPATIENT
Start: 2024-11-22

## 2024-12-07 ENCOUNTER — APPOINTMENT (OUTPATIENT)
Dept: RADIOLOGY | Facility: HOSPITAL | Age: 54
End: 2024-12-07
Payer: COMMERCIAL

## 2024-12-07 ENCOUNTER — HOSPITAL ENCOUNTER (EMERGENCY)
Facility: HOSPITAL | Age: 54
Discharge: HOME | End: 2024-12-08
Attending: STUDENT IN AN ORGANIZED HEALTH CARE EDUCATION/TRAINING PROGRAM
Payer: COMMERCIAL

## 2024-12-07 ENCOUNTER — APPOINTMENT (OUTPATIENT)
Dept: CARDIOLOGY | Facility: HOSPITAL | Age: 54
End: 2024-12-07
Payer: COMMERCIAL

## 2024-12-07 DIAGNOSIS — R10.84 GENERALIZED ABDOMINAL PAIN: Primary | ICD-10-CM

## 2024-12-07 LAB
ALBUMIN SERPL BCP-MCNC: 4 G/DL (ref 3.4–5)
ALP SERPL-CCNC: 30 U/L (ref 33–110)
ALT SERPL W P-5'-P-CCNC: 17 U/L (ref 7–45)
ANION GAP SERPL CALC-SCNC: 17 MMOL/L (ref 10–20)
AST SERPL W P-5'-P-CCNC: 25 U/L (ref 9–39)
BASOPHILS # BLD AUTO: 0.02 X10*3/UL (ref 0–0.1)
BASOPHILS NFR BLD AUTO: 0.6 %
BILIRUB DIRECT SERPL-MCNC: 0.1 MG/DL (ref 0–0.3)
BILIRUB SERPL-MCNC: 0.9 MG/DL (ref 0–1.2)
BNP SERPL-MCNC: 47 PG/ML (ref 0–99)
BUN SERPL-MCNC: 6 MG/DL (ref 6–23)
CALCIUM SERPL-MCNC: 9.3 MG/DL (ref 8.6–10.3)
CARDIAC TROPONIN I PNL SERPL HS: 4 NG/L (ref 0–13)
CHLORIDE SERPL-SCNC: 104 MMOL/L (ref 98–107)
CO2 SERPL-SCNC: 23 MMOL/L (ref 21–32)
CREAT SERPL-MCNC: 0.68 MG/DL (ref 0.5–1.05)
EGFRCR SERPLBLD CKD-EPI 2021: >90 ML/MIN/1.73M*2
EOSINOPHIL # BLD AUTO: 0.06 X10*3/UL (ref 0–0.7)
EOSINOPHIL NFR BLD AUTO: 1.8 %
ERYTHROCYTE [DISTWIDTH] IN BLOOD BY AUTOMATED COUNT: 16.8 % (ref 11.5–14.5)
GLUCOSE SERPL-MCNC: 70 MG/DL (ref 74–99)
HCT VFR BLD AUTO: 39.2 % (ref 36–46)
HGB BLD-MCNC: 12.7 G/DL (ref 12–16)
IMM GRANULOCYTES # BLD AUTO: 0.01 X10*3/UL (ref 0–0.7)
IMM GRANULOCYTES NFR BLD AUTO: 0.3 % (ref 0–0.9)
LACTATE SERPL-SCNC: 1.3 MMOL/L (ref 0.4–2)
LIPASE SERPL-CCNC: 48 U/L (ref 9–82)
LYMPHOCYTES # BLD AUTO: 1.13 X10*3/UL (ref 1.2–4.8)
LYMPHOCYTES NFR BLD AUTO: 33 %
MCH RBC QN AUTO: 27.4 PG (ref 26–34)
MCHC RBC AUTO-ENTMCNC: 32.4 G/DL (ref 32–36)
MCV RBC AUTO: 85 FL (ref 80–100)
MONOCYTES # BLD AUTO: 0.38 X10*3/UL (ref 0.1–1)
MONOCYTES NFR BLD AUTO: 11.1 %
NEUTROPHILS # BLD AUTO: 1.82 X10*3/UL (ref 1.2–7.7)
NEUTROPHILS NFR BLD AUTO: 53.2 %
NRBC BLD-RTO: 0 /100 WBCS (ref 0–0)
PLATELET # BLD AUTO: 251 X10*3/UL (ref 150–450)
POTASSIUM SERPL-SCNC: 3.6 MMOL/L (ref 3.5–5.3)
PROT SERPL-MCNC: 6.4 G/DL (ref 6.4–8.2)
RBC # BLD AUTO: 4.64 X10*6/UL (ref 4–5.2)
SODIUM SERPL-SCNC: 140 MMOL/L (ref 136–145)
WBC # BLD AUTO: 3.4 X10*3/UL (ref 4.4–11.3)

## 2024-12-07 PROCEDURE — 82248 BILIRUBIN DIRECT: CPT | Performed by: STUDENT IN AN ORGANIZED HEALTH CARE EDUCATION/TRAINING PROGRAM

## 2024-12-07 PROCEDURE — 74177 CT ABD & PELVIS W/CONTRAST: CPT

## 2024-12-07 PROCEDURE — 80053 COMPREHEN METABOLIC PANEL: CPT | Performed by: STUDENT IN AN ORGANIZED HEALTH CARE EDUCATION/TRAINING PROGRAM

## 2024-12-07 PROCEDURE — 83880 ASSAY OF NATRIURETIC PEPTIDE: CPT | Performed by: STUDENT IN AN ORGANIZED HEALTH CARE EDUCATION/TRAINING PROGRAM

## 2024-12-07 PROCEDURE — 2500000004 HC RX 250 GENERAL PHARMACY W/ HCPCS (ALT 636 FOR OP/ED): Performed by: STUDENT IN AN ORGANIZED HEALTH CARE EDUCATION/TRAINING PROGRAM

## 2024-12-07 PROCEDURE — 85025 COMPLETE CBC W/AUTO DIFF WBC: CPT | Performed by: STUDENT IN AN ORGANIZED HEALTH CARE EDUCATION/TRAINING PROGRAM

## 2024-12-07 PROCEDURE — 83605 ASSAY OF LACTIC ACID: CPT | Performed by: STUDENT IN AN ORGANIZED HEALTH CARE EDUCATION/TRAINING PROGRAM

## 2024-12-07 PROCEDURE — 99285 EMERGENCY DEPT VISIT HI MDM: CPT | Mod: 25 | Performed by: STUDENT IN AN ORGANIZED HEALTH CARE EDUCATION/TRAINING PROGRAM

## 2024-12-07 PROCEDURE — 36415 COLL VENOUS BLD VENIPUNCTURE: CPT | Performed by: STUDENT IN AN ORGANIZED HEALTH CARE EDUCATION/TRAINING PROGRAM

## 2024-12-07 PROCEDURE — 96374 THER/PROPH/DIAG INJ IV PUSH: CPT

## 2024-12-07 PROCEDURE — 74177 CT ABD & PELVIS W/CONTRAST: CPT | Performed by: STUDENT IN AN ORGANIZED HEALTH CARE EDUCATION/TRAINING PROGRAM

## 2024-12-07 PROCEDURE — 83690 ASSAY OF LIPASE: CPT | Performed by: STUDENT IN AN ORGANIZED HEALTH CARE EDUCATION/TRAINING PROGRAM

## 2024-12-07 PROCEDURE — 71045 X-RAY EXAM CHEST 1 VIEW: CPT

## 2024-12-07 PROCEDURE — 71045 X-RAY EXAM CHEST 1 VIEW: CPT | Performed by: RADIOLOGY

## 2024-12-07 PROCEDURE — 2550000001 HC RX 255 CONTRASTS: Performed by: STUDENT IN AN ORGANIZED HEALTH CARE EDUCATION/TRAINING PROGRAM

## 2024-12-07 PROCEDURE — 84484 ASSAY OF TROPONIN QUANT: CPT | Performed by: STUDENT IN AN ORGANIZED HEALTH CARE EDUCATION/TRAINING PROGRAM

## 2024-12-07 PROCEDURE — 93005 ELECTROCARDIOGRAM TRACING: CPT

## 2024-12-07 RX ORDER — HYDROMORPHONE HYDROCHLORIDE 0.2 MG/ML
0.2 INJECTION INTRAMUSCULAR; INTRAVENOUS; SUBCUTANEOUS ONCE
Status: COMPLETED | OUTPATIENT
Start: 2024-12-07 | End: 2024-12-07

## 2024-12-07 ASSESSMENT — LIFESTYLE VARIABLES
HAVE PEOPLE ANNOYED YOU BY CRITICIZING YOUR DRINKING: NO
HAVE YOU EVER FELT YOU SHOULD CUT DOWN ON YOUR DRINKING: NO
EVER FELT BAD OR GUILTY ABOUT YOUR DRINKING: NO
EVER HAD A DRINK FIRST THING IN THE MORNING TO STEADY YOUR NERVES TO GET RID OF A HANGOVER: NO
TOTAL SCORE: 0

## 2024-12-07 ASSESSMENT — PAIN SCALES - GENERAL: PAINLEVEL_OUTOF10: 7

## 2024-12-08 VITALS
WEIGHT: 113 LBS | SYSTOLIC BLOOD PRESSURE: 114 MMHG | BODY MASS INDEX: 20.02 KG/M2 | HEART RATE: 87 BPM | TEMPERATURE: 97.7 F | DIASTOLIC BLOOD PRESSURE: 71 MMHG | HEIGHT: 63 IN | OXYGEN SATURATION: 96 %

## 2024-12-08 NOTE — ED TRIAGE NOTES
Pt brought in via ems stating pt is from home and do have a hxs of brain, breast and lung cancer, today pt presented to the ED with abdominal pain x 3 days ago. Pt denied having any naesea, cough or fever.

## 2024-12-08 NOTE — ED PROVIDER NOTES
HPI   Chief Complaint   Patient presents with    Abdominal Pain       The patient is a 54-year-old female with past medical history as below presents today for evaluation of abdominal pain.  Located in the epigastric region has been going on for several days.  She has metastatic breast cancer that she is not treating at this time.  She is only getting palliative care.  No fever no nausea no vomiting no dysuria no diarrhea              Patient History   Past Medical History:   Diagnosis Date    Metastatic cancer (Multi)     Personal history of malignant neoplasm of breast     History of malignant neoplasm of breast     Past Surgical History:   Procedure Laterality Date    HYSTERECTOMY  2016    Hysterectomy    OTHER SURGICAL HISTORY  2022    Breast reduction    OTHER SURGICAL HISTORY  2022    Lumpectomy    OTHER SURGICAL HISTORY  2022    Lung wedge resection    PERICARDIAL WINDOW  2024    PERICARDIAL WINDOW  2024     Family History   Problem Relation Name Age of Onset    Dementia Mother      Heart attack Father      Other (cabg) Father       Social History     Tobacco Use    Smoking status: Former     Current packs/day: 0.00     Types: Cigarettes     Quit date:      Years since quittin.9    Smokeless tobacco: Never   Substance Use Topics    Alcohol use: Never    Drug use: Never       Physical Exam   ED Triage Vitals [24 2200]   Temperature Heart Rate Resp BP   36.5 °C (97.7 °F) 98 -- 124/83      Pulse Ox Temp Source Heart Rate Source Patient Position   100 % Temporal -- --      BP Location FiO2 (%)     -- --       Physical Exam  Vitals and nursing note reviewed.   Constitutional:       Appearance: Normal appearance.   HENT:      Head: Normocephalic and atraumatic.      Nose: Nose normal.      Mouth/Throat:      Mouth: Mucous membranes are moist.   Eyes:      Conjunctiva/sclera: Conjunctivae normal.   Cardiovascular:      Rate and Rhythm: Normal rate and regular rhythm.       Pulses: Normal pulses.      Heart sounds: Normal heart sounds.   Pulmonary:      Effort: Pulmonary effort is normal.      Breath sounds: Normal breath sounds.   Abdominal:      General: Abdomen is flat.      Palpations: Abdomen is soft.      Tenderness: There is no abdominal tenderness. There is no guarding or rebound. Negative signs include Siu's sign.   Musculoskeletal:         General: No deformity.   Neurological:      Mental Status: She is alert and oriented to person, place, and time. Mental status is at baseline.   Psychiatric:         Mood and Affect: Mood normal.         Behavior: Behavior normal.           ED Course & MDM   ED Course as of 12/08/24 0050   Sun Dec 08, 2024   0049 ECG 12 lead  Sinus rhythm rate 98 left axis deviation [SE]      ED Course User Index  [SE] Jeremiah White MD         Diagnoses as of 12/08/24 0050   Generalized abdominal pain                 No data recorded     [place] Coma Scale Score: 15 (12/07/24 2201 : Randa Thapa RN)                           Medical Decision Making  Patient presents for evaluation of abdominal pain.  Labs and CT imaging were obtained and unremarkable.  Discussed admission for pain control palliative care consultation.  Suspect the pain may be due to her invasive metastatic cancer of the mediastinum.  Patient and  declined however preferred to be at home.  They do have access to the palliative care narcotic pain medication as needed will return if they feel that her needs are not met at home or new or worsening symptoms.    Amount and/or Complexity of Data Reviewed  Independent Historian: spouse  Labs: ordered.  Radiology: ordered.  ECG/medicine tests: ordered and independent interpretation performed. Decision-making details documented in ED Course.    Risk  Parenteral controlled substances.  Decision regarding hospitalization.        Procedure  Procedures     Jeremiah White MD  12/08/24 0050

## 2024-12-08 NOTE — DISCHARGE INSTRUCTIONS
If your symptoms worsen or feel like you need to be watched in the hospital for pain you should come back to the emergency department.  Please follow-up with your palliative care doctors

## 2024-12-15 LAB
ATRIAL RATE: 98 BPM
P AXIS: 57 DEGREES
PR INTERVAL: 127 MS
Q ONSET: 253 MS
QRS COUNT: 16 BEATS
QRS DURATION: 74 MS
QT INTERVAL: 353 MS
QTC CALCULATION(BAZETT): 451 MS
QTC FREDERICIA: 415 MS
R AXIS: -21 DEGREES
T AXIS: 51 DEGREES
T OFFSET: 429 MS
VENTRICULAR RATE: 98 BPM

## 2024-12-17 ENCOUNTER — APPOINTMENT (OUTPATIENT)
Dept: CARDIOLOGY | Facility: CLINIC | Age: 54
End: 2024-12-17
Payer: COMMERCIAL

## 2024-12-19 ENCOUNTER — APPOINTMENT (OUTPATIENT)
Dept: CARDIAC SURGERY | Facility: CLINIC | Age: 54
End: 2024-12-19
Payer: COMMERCIAL

## (undated) DEVICE — NEEDLE, SPINAL, QUINCKE, 18 G X 3.5 IN, PINK HUB

## (undated) DEVICE — ADHESIVE, SKIN, LIQUIBAND EXCEED

## (undated) DEVICE — CATHETER TRAY, SURESTEP, 16FR, URINE METER W/STATLOCK

## (undated) DEVICE — LIGASURE, SEALER/DIVIDER MARYLAND JAW, 5MM

## (undated) DEVICE — SUTURE, VICRYL, 0, 18 IN, CT-1, UNDYED

## (undated) DEVICE — CONNECTOR, 3/8 X 1/4, STRAIGHT, STERILE

## (undated) DEVICE — TRAP, SPECIMEN, 40 ML

## (undated) DEVICE — SUTURE, VICRYL 2-0, TAPER POINT, CT-1 UNDYED 27 INCH

## (undated) DEVICE — GRASPER TIP, ALLIS, 5MM, DISP

## (undated) DEVICE — Device

## (undated) DEVICE — SUTURE, VICRYL, 4-0, 18 IN, PS2, UNDYED

## (undated) DEVICE — SLEEVE, KII, Z-THREAD, 5X100CM

## (undated) DEVICE — TROCAR, KII OPTICAL BLADELESS 5MM Z THREAD 100MM LNGTH

## (undated) DEVICE — CATHETER KIT, RADIAL ARTLINE, 20G X 1 3/4

## (undated) DEVICE — DRESSING, ISLAND, TELFA, 4 X 5 IN

## (undated) DEVICE — APPLICATOR, CHLORAPREP, W/ORANGE TINT, 26ML

## (undated) DEVICE — HANDLE COVER, LIGHT, RIGID, DISP, LF

## (undated) DEVICE — SENSOR, OXYGEN, CEREBRAL, SOMASENSOR, ADULT

## (undated) DEVICE — DRAPE, SHEET, CARDIOVASCULAR, ANTIMICROBIAL, W/ANESTHESIA SCREEN, IOBAN 2, STERI DRAPE, 107 X 133 IN, DISPOSABLE, FABRIC, BLUE, STERILE

## (undated) DEVICE — NEEDLE, HYPODERMIC, SAFETYGLIDE, SHIELDING, REGULAR WALL, REGULAR BEVEL, 22 G X 1.5 IN, BLACK HUB

## (undated) DEVICE — DRAPE, INSTRUMENT, W/POUCH, STERI DRAPE, 7 X 11 IN, DISPOSABLE, STERILE

## (undated) DEVICE — SCISSOR TIP, ENDOCUT, LAPAROSCOPIC

## (undated) DEVICE — CARE KIT, LAPAROSCOPIC, ADVANCED

## (undated) DEVICE — MICROCOAGULATION TEST, ACT+ TEST CUVETTE

## (undated) DEVICE — TUBE SET, PNEUMOLAR HEATED, SMOKE EVACU, HIGH-FLOW

## (undated) DEVICE — ASCOPE 4 SLIM (3.8/1.2), SINGLE USE, STERILE

## (undated) DEVICE — ASSEMBLY, STRYKER FLOW 2, SUCTION IRRIGATOR, WITH TIP

## (undated) DEVICE — NEEDLE, CLOSURE, OMNICLOSE

## (undated) DEVICE — TROCAR, OPTICAL, BLADELESS, 12MM, THREADED, 100MM LENGTH

## (undated) DEVICE — SUTURE, ETHIBOND, XTRA, 30 IN, 0, CT-1, GREEN

## (undated) DEVICE — DRAIN, CHANNEL, HUBLESS, ROUND, FLUTED, 24FR

## (undated) DEVICE — ELECTROSURGICAL, CLEANER, LECTROBRASIVE

## (undated) DEVICE — COLLECTION UNIT, DRAINAGE, THORACIC, SINGLE TUBE, DRY SUCTION, ATS COMPATIBLE, OASIS 3600, LF

## (undated) DEVICE — ELECTRODE, ELECTROSURGICAL, BLADE EXT 4 INCH, INSULATED

## (undated) DEVICE — TUBE, ENDOBRONCHIAL, BRONCHO-CATH, 37 FR, LEFT

## (undated) DEVICE — DISSECTOR, KITTNER, PEANUT, 5MM